# Patient Record
Sex: MALE | Race: WHITE | NOT HISPANIC OR LATINO
[De-identification: names, ages, dates, MRNs, and addresses within clinical notes are randomized per-mention and may not be internally consistent; named-entity substitution may affect disease eponyms.]

---

## 2019-02-05 PROBLEM — E03.9 HYPOTHYROIDISM, UNSPECIFIED TYPE: Status: ACTIVE | Noted: 2019-02-05

## 2019-02-07 ENCOUNTER — NON-APPOINTMENT (OUTPATIENT)
Age: 84
End: 2019-02-07

## 2019-02-07 ENCOUNTER — APPOINTMENT (OUTPATIENT)
Dept: HEART AND VASCULAR | Facility: CLINIC | Age: 84
End: 2019-02-07
Payer: MEDICARE

## 2019-02-07 VITALS — SYSTOLIC BLOOD PRESSURE: 152 MMHG | DIASTOLIC BLOOD PRESSURE: 92 MMHG

## 2019-02-07 VITALS
WEIGHT: 170 LBS | SYSTOLIC BLOOD PRESSURE: 148 MMHG | DIASTOLIC BLOOD PRESSURE: 83 MMHG | OXYGEN SATURATION: 98 % | BODY MASS INDEX: 25.47 KG/M2 | HEART RATE: 69 BPM | HEIGHT: 68.5 IN

## 2019-02-07 DIAGNOSIS — E55.9 VITAMIN D DEFICIENCY, UNSPECIFIED: ICD-10-CM

## 2019-02-07 DIAGNOSIS — E03.9 HYPOTHYROIDISM, UNSPECIFIED: ICD-10-CM

## 2019-02-07 PROCEDURE — 93000 ELECTROCARDIOGRAM COMPLETE: CPT

## 2019-02-07 PROCEDURE — 99215 OFFICE O/P EST HI 40 MIN: CPT | Mod: 25

## 2019-02-07 NOTE — REASON FOR VISIT
[Follow-Up - Clinic] : a clinic follow-up of [FreeTextEntry1] : Diagnostic Tests:\par -----------------------------------\par ECG:\par 02/07/19:  A-sensed, V-paced at 70 bpm. \par 01/09/18: A-sensed, V-paced at 70 bpm. \par 05/12/16: A-sensed, V-paced at 62 BPM. \par 03/24/15: A-paced, V-paced at 64bpm\par 08/28/13: A-paced, V-sensed, RBBB, LAFB, prolonged A paced to V-sensed delay (288ms)\par -----------------------------------\par CTCA:\par 02/24/15: Ca+ score 1027.41 (75th percentile), LM distal 50%, LAD prox 75%, LCX moderate, RCA moderate, PDA 50%, EF 56%, no RWMA\par ----------------------------\par Stress:\par 10/04/17: regadenoson MPI: EF 47%, normal perfusion.\par 03/03/15: exercise MPI: 7 METS, EF 54%, fixed inferior and inferoseptal defects\par 02/29/12: exercise MPI: 7 METS, mild inferior and inferoseptal scar, no ischemia, EF 54%, no RWMA\par 03/02/09: exercise MPI: 7 METS, mild inferior and inferoseptal scar, no ischemia, EF 54%, no RWMA\par ----------------------------\par Echo:\par 10/05/17: EF 38%, grade I diastolic dysfunction, reduced RV function, akinesis apical septum and basal/mid inferolateral walls, severe AS, NATE 0.8cm2, LVOT/AV 0.23, mean gradient 23mmHg. \par 05/12/16: EF 36%, akinesis apical anterior, apical inferior and apical walls, moderate AS, NATE 1.2cm2, moderate TR, mild MR, LVH\par 04/02/15: EF 41%, mid LAD MI, mild AS, NATE 1.7cm2, grade I diastolic dysfunction, dilated LA, mild TR, PASP 36mmHg\par 02/28/12: EF 55%, mild TR, diastolic dysfunction\par 02/14/09: EF 55%, aortic sclerosis, diastolic dysfunction\par -----------------------------\par Tilt table test:\par 08/14/13: + orthostatic induced vasovagal symptoms\par ----------------------------\par Carotid:\par 11/15/17: sono: EMI 40-59%, s/p left CEA patent\par 04/03/15: sono: s/p left CEA, LICA 20-49%, EMI 50-69%\par ----------------------------\par Cath:\par 03/16/15: EF 50%, LAD MI, LM distal 40%, LAD prox 95%, mid 80%, D3 50%, LCX prox 50%, distal 90%, LPL ostium 80%, RCA mid 40%, pDA 100% with collaterals from LAD\par ----------------------------\par Aorto-Iliac:\par 11/15/17: sono: AAA mid 3.6cm x 3.2cm, RCIA 2.5cm x 2.3cm. LCIA 1.9cm x 1.7cm.\par ----------------------------\par Upper extremity veins:\par 11/17/16: sono: right: no RUE DVT.

## 2019-02-07 NOTE — ASSESSMENT
[FreeTextEntry1] : 1. CAD: s/p CABG x 5 (04/06/15) (Malachi Jose MD), CCS 0, left pleural effusion s/p tap 06/01/15, s/p MPI stress test: 10/04/17: regadenoson MPI: EF 47%, normal perfusion.\par             - continue aspirin 81mg po daily\par             - continue Nitroglycerin Tablet Sublingual, 0.4 MG, Sublingual, Once a day as needed\par             - will pursue aggressive medical management\par             - will send for an echocardiogram \par  \par 2. Chronic systolic heart failure: NYHA II, EF 38% (10/05/17), s/p CRT-P, no indication for ICD for primary prevention of SCD at this time:\par             - continue labetalol and lisinopril\par             - continue prn loop diuretics, lasix 80mg po daily prn \par             - discussed with patient importance of maintaining a weight log, avoidance of dietary sodium, as well as appropriate use of loop diuretics\par             - will follow with serial echocardiograms (ordered today)\par             - consider switching ACE-I (lisinopril) to ARNI (Entresto) in future. \par \par 3. Carotid stenosis: s/p left CEA 12/14/04 (Kareem Swain MD), 11/15/17: sono: EMI 40-59%, left CEA patent\par             - will monitor with periodic carotid sonograms (performed at the vascular surgeon's office)\par             - continue statin and anti-platelet agent\par             - discussed with patient surveillance for neurologic symptoms. \par \par 4. Aortic stenosis: severe, low gradient (NATE 0.8cm2, LVOT/AV 0.23, mean gradient 28mmHg) (10/05/17), + increased BRANDT\par             - will follow with serial echocardiograms (ordered today)\par             - discussed with patient importance of symptom surveillance (chest pain, BRANDT, edema, or syncope). \par  \par 5. Aortic aneurysm, abdominal: s/p sono: mid 3.6cm x 3.2cm with b/l SLADE aneurysms, RCIA 2.5cm x 2.3cm, LCIA 1.9cm x 1.7cm (11/15/17)\par             - follow up with vascular surgeon, Kareem Swain MD. \par             - continue HTN management\par \par 6. HTN: BP at ACC/AHA 2017 guideline target, ? occasional low reading on ambulatory log\par             - continue labetalol 300mg po bid (hold evening dose if SBP <120)\par             - continue lisinopril 40mg po daily\par             - will review ambulatory BP log and calibrate device next visit       \par \par 7. BPH (benign prostatic hyperplasia) \par             - continue Finasteride Tablet, 5 MG, 1 tablet, Orally, Once a day\par             - continue Tamsulosin HCl Capsule, 0.4 MG, 1 capsule 30 minutes after the same meal each day, Orally, Once a day\par \par 8. Presence of cardiac pacemaker: s/p CRT-P, upgraded from PPM 09/2016 complicated by hematoma requiring device extraction and replacement on the right side (11/2016).\par             - continue follow up with device clinic. (will enroll in [facility] device clinic)\par \par 9. Dyslipidemia:\par             - continue atorvastatin 80mg po daily\par             - check lab work today

## 2019-02-07 NOTE — HISTORY OF PRESENT ILLNESS
[FreeTextEntry1] : Mr. Mays presents for follow up and management of HTN, dyslipidemia, CAD s/p CABG x 5 (04/06/15), chronic systolic heart failure, severe low gradient aortic stenosis, and YORDY s/p left CEA. He underwent 5-vessel coronary artery bypass grafting on 04/06/15. He had an echocardiogram on 05/12/16 which revealed an EF of 36%, akinesis of the apical anterior, apical inferior, and apical walls, moderate AS, NATE 1.2cm2, moderate TR, mild MR, and mild LVH. His pacemaker was upgraded to a CRT-P on 08/03/16. He had bleeding into the pocket and had pocket revision on 09/28/16. He was evaluated by Antonio Bustillo MD (general surgeon) at [facility] to address wound healing over the device. In 2017, in the office I examined the device wound and noted the wound edges not to be opposed and the device to be partially exposed. He went on to have the device extracted and replaced (11/2016) (Osito Alicea MD). He had an MPI stress test on 10/04/17 which revealed an EF of 47%, normal perfusion. He had an echocardiogram on 10/05/17 which revealed an EF of 38%, grade I diastolic dysfunction, reduced RV function, akinesis apical septum and basal/mid inferolateral walls, severe AS, NATE 0.8cm2, LVOT/AV 0.23, and mean gradient 23mmHg.  At present, he has complaints of "loosing a step" and progressive BRANDT despite minimal lower extremity edema. We discussed the possibility that this represents progression of his aortic stenosis.  His ambulatory BP log reveals evening readings with an SBP range of 98--140.  We discussed maintaining an ambulatory BP log and holding his evening dose of labetalol if his SBP pre-medication is <120 in the evenings.

## 2019-02-07 NOTE — REVIEW OF SYSTEMS
[Dyspnea on exertion] : dyspnea during exertion [Lower Ext Edema] : lower extremity edema [Urinary Frequency] : urinary frequency [Joint Pain] : joint pain [Negative] : Heme/Lymph [Feeling Fatigued] : feeling fatigued

## 2019-02-07 NOTE — PHYSICAL EXAM
[General Appearance - Well Developed] : well developed [Normal Appearance] : normal appearance [Well Groomed] : well groomed [General Appearance - Well Nourished] : well nourished [No Deformities] : no deformities [General Appearance - In No Acute Distress] : no acute distress [Normal Conjunctiva] : the conjunctiva exhibited no abnormalities [Eyelids - No Xanthelasma] : the eyelids demonstrated no xanthelasmas [Normal Oral Mucosa] : normal oral mucosa [No Oral Pallor] : no oral pallor [No Oral Cyanosis] : no oral cyanosis [Respiration, Rhythm And Depth] : normal respiratory rhythm and effort [Exaggerated Use Of Accessory Muscles For Inspiration] : no accessory muscle use [Auscultation Breath Sounds / Voice Sounds] : lungs were clear to auscultation bilaterally [Heart Rate And Rhythm] : heart rate and rhythm were normal [Heart Sounds] : normal S1 and S2 [Abdomen Soft] : soft [Abdomen Tenderness] : non-tender [Abdomen Mass (___ Cm)] : no abdominal mass palpated [Abnormal Walk] : normal gait [Gait - Sufficient For Exercise Testing] : the gait was sufficient for exercise testing [Nail Clubbing] : no clubbing of the fingernails [Cyanosis, Localized] : no localized cyanosis [Petechial Hemorrhages (___cm)] : no petechial hemorrhages [Skin Color & Pigmentation] : normal skin color and pigmentation [] : no rash [No Venous Stasis] : no venous stasis [Skin Lesions] : no skin lesions [No Skin Ulcers] : no skin ulcer [No Xanthoma] : no  xanthoma was observed [Oriented To Time, Place, And Person] : oriented to person, place, and time [Affect] : the affect was normal [Mood] : the mood was normal [No Anxiety] : not feeling anxious [FreeTextEntry1] : + median sternotomy, + device generator anterior right chest, 2/6 SAMIR late peaking RUSB without radiation

## 2019-02-08 LAB
24R-OH-CALCIDIOL SERPL-MCNC: 39.3 PG/ML
ALBUMIN SERPL ELPH-MCNC: 4.3 G/DL
ALP BLD-CCNC: 55 U/L
ALT SERPL-CCNC: 33 U/L
ANION GAP SERPL CALC-SCNC: 15 MMOL/L
AST SERPL-CCNC: 33 U/L
BASOPHILS # BLD AUTO: 0.03 K/UL
BASOPHILS NFR BLD AUTO: 0.5 %
BILIRUB SERPL-MCNC: 2.2 MG/DL
BUN SERPL-MCNC: 12 MG/DL
CALCIUM SERPL-MCNC: 10 MG/DL
CHLORIDE SERPL-SCNC: 106 MMOL/L
CHOLEST SERPL-MCNC: 122 MG/DL
CHOLEST/HDLC SERPL: 2 RATIO
CO2 SERPL-SCNC: 24 MMOL/L
CREAT SERPL-MCNC: 1.13 MG/DL
EOSINOPHIL # BLD AUTO: 0.1 K/UL
EOSINOPHIL NFR BLD AUTO: 1.6 %
GLUCOSE SERPL-MCNC: 83 MG/DL
HBA1C MFR BLD HPLC: 5.3 %
HCT VFR BLD CALC: 41 %
HDLC SERPL-MCNC: 61 MG/DL
HGB BLD-MCNC: 13 G/DL
IMM GRANULOCYTES NFR BLD AUTO: 0.2 %
LDLC SERPL CALC-MCNC: 50 MG/DL
LDLC SERPL DIRECT ASSAY-MCNC: 55 MG/DL
LYMPHOCYTES # BLD AUTO: 1.55 K/UL
LYMPHOCYTES NFR BLD AUTO: 25.4 %
MAN DIFF?: NORMAL
MCHC RBC-ENTMCNC: 31.7 GM/DL
MCHC RBC-ENTMCNC: 31.9 PG
MCV RBC AUTO: 100.7 FL
MONOCYTES # BLD AUTO: 0.53 K/UL
MONOCYTES NFR BLD AUTO: 8.7 %
NEUTROPHILS # BLD AUTO: 3.89 K/UL
NEUTROPHILS NFR BLD AUTO: 63.6 %
PLATELET # BLD AUTO: 197 K/UL
POTASSIUM SERPL-SCNC: 4.4 MMOL/L
PROT SERPL-MCNC: 7.2 G/DL
RBC # BLD: 4.07 M/UL
RBC # FLD: 15 %
SODIUM SERPL-SCNC: 145 MMOL/L
TRIGL SERPL-MCNC: 57 MG/DL
TSH SERPL-ACNC: 3.75 UIU/ML
WBC # FLD AUTO: 6.11 K/UL

## 2019-02-12 ENCOUNTER — APPOINTMENT (OUTPATIENT)
Dept: HEART AND VASCULAR | Facility: CLINIC | Age: 84
End: 2019-02-12
Payer: MEDICARE

## 2019-02-12 ENCOUNTER — APPOINTMENT (OUTPATIENT)
Dept: HEART AND VASCULAR | Facility: CLINIC | Age: 84
End: 2019-02-12

## 2019-02-12 VITALS — DIASTOLIC BLOOD PRESSURE: 78 MMHG | SYSTOLIC BLOOD PRESSURE: 155 MMHG

## 2019-02-12 PROCEDURE — 93306 TTE W/DOPPLER COMPLETE: CPT

## 2019-03-06 ENCOUNTER — APPOINTMENT (OUTPATIENT)
Dept: CARDIOTHORACIC SURGERY | Facility: CLINIC | Age: 84
End: 2019-03-06
Payer: MEDICARE

## 2019-03-06 VITALS
DIASTOLIC BLOOD PRESSURE: 87 MMHG | SYSTOLIC BLOOD PRESSURE: 164 MMHG | BODY MASS INDEX: 26.07 KG/M2 | HEART RATE: 70 BPM | TEMPERATURE: 98 F | WEIGHT: 172 LBS | RESPIRATION RATE: 98 BRPM | HEIGHT: 68 IN

## 2019-03-06 DIAGNOSIS — Z86.79 PERSONAL HISTORY OF OTHER DISEASES OF THE CIRCULATORY SYSTEM: ICD-10-CM

## 2019-03-06 DIAGNOSIS — Z80.0 FAMILY HISTORY OF MALIGNANT NEOPLASM OF DIGESTIVE ORGANS: ICD-10-CM

## 2019-03-06 DIAGNOSIS — Z82.49 FAMILY HISTORY OF ISCHEMIC HEART DISEASE AND OTHER DISEASES OF THE CIRCULATORY SYSTEM: ICD-10-CM

## 2019-03-06 DIAGNOSIS — Z82.3 FAMILY HISTORY OF STROKE: ICD-10-CM

## 2019-03-06 DIAGNOSIS — Z87.438 PERSONAL HISTORY OF OTHER DISEASES OF MALE GENITAL ORGANS: ICD-10-CM

## 2019-03-06 PROCEDURE — 99205 OFFICE O/P NEW HI 60 MIN: CPT

## 2019-03-07 PROBLEM — Z86.79 HISTORY OF CORONARY ARTERY DISEASE: Status: RESOLVED | Noted: 2019-03-07 | Resolved: 2019-03-07

## 2019-03-07 PROBLEM — Z87.438 HISTORY OF BENIGN PROSTATIC HYPERPLASIA: Status: RESOLVED | Noted: 2019-03-07 | Resolved: 2019-03-07

## 2019-03-07 PROBLEM — Z86.79 HISTORY OF PERIPHERAL ARTERIAL DISEASE: Status: RESOLVED | Noted: 2019-03-07 | Resolved: 2019-03-07

## 2019-03-07 NOTE — PHYSICAL EXAM
[General Appearance - Alert] : alert [General Appearance - In No Acute Distress] : in no acute distress [General Appearance - Well Nourished] : well nourished [General Appearance - Well-Appearing] : healthy appearing [Sclera] : the sclera and conjunctiva were normal [Strabismus] : no strabismus was seen [Outer Ear] : the ears and nose were normal in appearance [Examination Of The Oral Cavity] : the lips and gums were normal [Neck Appearance] : the appearance of the neck was normal [Neck Cervical Mass (___cm)] : no neck mass was observed [Jugular Venous Distention Increased] : there was no jugular-venous distention [Respiration, Rhythm And Depth] : normal respiratory rhythm and effort [Exaggerated Use Of Accessory Muscles For Inspiration] : no accessory muscle use [Auscultation Breath Sounds / Voice Sounds] : lungs were clear to auscultation bilaterally [Normal S1] : normal S1 [Normal S2] : normal S2 [S2 Diminished] : was diminished [III] : a grade 3 [No Pitting Edema] : no pitting edema present [Chest Visual Inspection Thoracic Asymmetry] : no chest asymmetry [Abdomen Soft] : soft [Abdomen Tenderness] : non-tender [No CVA Tenderness] : no ~M costovertebral angle tenderness [Abnormal Walk] : normal gait [Nail Clubbing] : no clubbing  or cyanosis of the fingernails [Involuntary Movements] : no involuntary movements were seen [Motor Tone] : muscle strength and tone were normal [Skin Color & Pigmentation] : normal skin color and pigmentation [] : no rash [Cranial Nerves] : cranial nerves 2-12 were intact [Oriented To Time, Place, And Person] : oriented to person, place, and time [Impaired Insight] : insight and judgment were intact [Affect] : the affect was normal [Mood] : the mood was normal

## 2019-03-08 NOTE — DATA REVIEWED
[FreeTextEntry1] : Echo 2019\par EF = 44%\par AV Vmax 3.6 m/s\par NATE 0.75 cm2\par MG 31 mmHG\par SV 22ml/m2\par mild LVH\par Trileaflet\par trace MR / Mod TR\par trace pulmonic regurgitation\par \par Cath 03/16/15 \par EF 50%, LAD MI, LM distal 40%, LAD prox 95%, mid 80%, D3 50%, distal 90%, LPL ostium 80%, RCA mid 40%, pDA 100% with collaterals from LAD\par \par Carotid: 11/15/17\par EMI 40/59% s/p left CEA patent \par \par EC19\par A-sensed, V-paced at 70 bpm\par \par

## 2019-03-08 NOTE — ASSESSMENT
[FreeTextEntry1] : Mr. Mays, is a very pleasant 88 year old gentlemen, who was perviously employed as a [employer]  and  prior to that.   He presents today with his spouse and two daughters for a consultation with Dr. Mccormick for his aortic valve disease of aortic stenosis.  His PHMx consists of CAD s/p CABG X 5V (04/06/15), chronic systolic HF (EF 44%), s/p PPM (CRT-P (08/03/16) with device extracted and replaced on 11/2016 2/2 device 2/2 partially exposure, NYHA II, YORDY s/p left CEA (12/14/04), PVD, abdominal aortic aneurysm (3.6 cm X 3.2 cm monitored by [facility] -  Dr. Swain), BPH, HTN and dyslipidemia.  \par \par Symptomatic Aortic Stenosis, A long discussion/education conducted with Pt and Pt's family by Dr. Mccormick on Quality of Life and Plan of Care. TTE revealed severely calcified trileaflet AV with increased gradients (mean 30mmHg, possible low flow low gradient vs underappreciating true gradients on TTE). Pt agrees to move forward with TAVR diagnostic work-up.   All questions answered by Dr. Mccormick.\par \par Dr. Mccormick will discuss plan of care with Dr. Martins.\par Plan for TAVR diagnostics/screenings. \par F/u with Patient/family to discuss plan of care.\par Continue Medication Regimen. \par Video Education provided on Aortic Stenosis and TAVR procedure to Pt/family. \par

## 2019-03-08 NOTE — HISTORY OF PRESENT ILLNESS
[Dyslipidemia] : Dyslipidemia [CVD Carotid Stenosis] : CVD Carotid Stenosis   [Right] : Right [No angina, No symptoms] : No symptoms of [Heart Failure within 2 Weeks] : Heart Failure in last 2 weeks [Class II] : Class II [FreeTextEntry1] : Mr. Mays, is a very pleasant 88 year old gentlemen, who was perviously employed as a [employer]  and  prior to that.   He presents today with his spouse and two daughters for a consultation with Dr. Mccormick for his aortic valve disease of aortic stenosis.  His PHMx consists of CAD s/p CABG X 5V (04/06/15), chronic systolic HF (EF 44%), s/p PPM (CRT-P (08/03/16) with device extracted and replaced on 11/2016 2/2 device 2/2 partially exposure, NYHA II, YORDY s/p left CEA (12/14/04), PVD, abdominal aortic aneurysm (3.6 cm X 3.2 cm monitored by Vasc -  Dr. Swain), BPH, HTN and dyslipidemia.  \par \par Pt reports, " a decline in his stamina", progressively worsening over the last year or so.  Experiencing increase BRANDT.  Pt claims, he was able to walk about 2 miles and now notices a change in his distance due to his SOB, will have BRANDT with 1 FOS.  Mr. Mays mentions, his blood pressure drops in the evening hours "100/50's"  then he feels weak and tired.   Pt denies CP, palpitations, and dizziness.  Pt is independent and high functioning, he continues to do chores around the house and still drives.  \par \par  Pt was made aware of his aortic valve by Dr. Martins approx 2 years ago. [Diabetes Mellitus] : no Diabetes Melllitus [Home Oxygen] : no home oxygen use [Liver Disease] : no liver disease [Unresponsive Neurologic State] : not in a unresponsive neurologic state [Cerebrovascular Disease] : no cerebrovascular disease [Prior Myocardial Infarction] : No prior myocardial infarction

## 2019-03-08 NOTE — REVIEW OF SYSTEMS
[Feeling Tired] : feeling tired [SOB on Exertion] : shortness of breath during exertion [Negative] : Psychiatric [Fever] : no fever [Chills] : no chills [Feeling Poorly] : not feeling poorly [Recent Weight Gain (___ Lbs)] : no recent weight gain [Recent Weight Loss (___ Lbs)] : no recent weight loss [Chest Pain] : no chest pain [Palpitations] : no palpitations [Lower Ext Edema] : no extremity edema [Wheezing] : no wheezing [Abdominal Pain] : no abdominal pain [Vomiting] : no vomiting [Constipation] : no constipation [Diarrhea] : no diarrhea [Confused] : no confusion [Convulsions] : no convulsions [Dizziness] : no dizziness [Fainting] : no fainting [Limb Weakness] : no limb weakness [Difficulty Walking] : no difficulty walking

## 2019-03-08 NOTE — REASON FOR VISIT
[Consultation] : a consultation visit [Spouse] : spouse [Family Member] : family member [FreeTextEntry1] : Aortic Valve Disease of Aortic Stenosis

## 2019-03-18 ENCOUNTER — FORM ENCOUNTER (OUTPATIENT)
Age: 84
End: 2019-03-18

## 2019-03-19 ENCOUNTER — OUTPATIENT (OUTPATIENT)
Dept: OUTPATIENT SERVICES | Facility: HOSPITAL | Age: 84
LOS: 1 days | Discharge: HOME | End: 2019-03-19
Payer: MEDICARE

## 2019-03-19 DIAGNOSIS — I71.9 AORTIC ANEURYSM OF UNSPECIFIED SITE, WITHOUT RUPTURE: ICD-10-CM

## 2019-03-19 DIAGNOSIS — I35.0 NONRHEUMATIC AORTIC (VALVE) STENOSIS: ICD-10-CM

## 2019-03-19 DIAGNOSIS — I25.10 ATHEROSCLEROTIC HEART DISEASE OF NATIVE CORONARY ARTERY WITHOUT ANGINA PECTORIS: ICD-10-CM

## 2019-03-19 PROCEDURE — 93880 EXTRACRANIAL BILAT STUDY: CPT | Mod: 26

## 2019-03-20 ENCOUNTER — TRANSCRIPTION ENCOUNTER (OUTPATIENT)
Age: 84
End: 2019-03-20

## 2019-03-25 NOTE — PROGRESS NOTE ADULT - SUBJECTIVE AND OBJECTIVE BOX
[facility] Interventional Cardiology Addendum Note to Structural Heart AMBI H&P:     Attending MD: Dr John Mccormick        S: Pt presents for Right/Right and Left/Left Heart Catheterization (see office H&P for detailed History).  Pt reports that today he/she feels ...............        Allergies    Allergy Status Unknown    Intolerances        Current Medications:       PHYSICAL EXAM    V/S		BP:		        HR:	           RR: 		  TEMP:     General:   HEENT: NCAT, EOMI, PERRLA  NECK: No JVD, No carotid bruits B/L, +2 Carotid pulses B/L  PULM:  CTA B/L No W/R/R  CARD: RRR/Irreg, +S1 +S2,  M/R/G  ABD: ND, +BS, NT, no masses  EXT: Warm, No pedal edema  NEURO: A & O x 3, no focal neurologic deficits  PULSES:	     B	          R	      	  FEM          		           DP        PT       Right              			  No/Yes	        Bruit	          Left       	         		  No/Yes	        Bruit	   		                                                              LABS:                        EKG:    ASA _____				Mallampati class: _________	    A/P:          Sedation Plan:   ? None   ? Moderate    ?  Deep    ?  General Anesthesia   Patient Is Suitable Candidate For Sedation?     ? Yes   ? No   ? Not Applicable     Risks & benefits of procedure and sedation and risks and benefits for the alternative therapy have been explained to the patient in layman’s terms including but not limited to: allergic reaction, bleeding, infection, arrhythmia, respiratory compromise, renal and vascular compromise, limb damage, MI, CVA, emergent CABG/Vascular Surgery and death. Informed consent obtained and in chart. [facility] Interventional Cardiology Addendum Note to Structural Heart AMBI H&P:     Attending MD: Dr John Mccormick        S: Pt presents for Right/Right and Left/Left Heart Catheterization (see office H&P for detailed History).  Pt reports that today he feels well and denies C/P, SOB at this time.       Allergies    NKDA NKFA    Intolerances        Current Medications:  Aspirin Low Dose 81 MG TABS; TAKE 1 TABLET DAILY  Atorvastatin Calcium 80 MG Oral Tablet; TAKE 1 TABLET DAILY  Furosemide 80 MG Oral Tablet; TAKE 1 TABLET DAILY AS DIRECTED  Labetalol HCl - 300 MG Oral Tablet; TAKE 1 TABLET EVERY 12 HOURS DAILY  Lisinopril 40 MG Oral Tablet; TAKE 1 TABLET DAILY FOR BLOOD PRESSURE  Nitroglycerin 0.4 MG Sublingual Tablet Sublingual  Proscar 5 MG Oral Tablet; TAKE 1 TABLET DAILY  Synthroid 137 MCG Oral Tablet; TAKE 1 TABLET DAILY  Tamsulosin HCl - 0.4 MG Oral Capsule; TAKE 1 CAPSULE BY MOUTH 1 TIME AFTER  MEALS    PHYSICAL EXAM    V/S		BP: 189/81		        HR: 70	           RR: 		  TEMP: 97.1 F              O2 sat 98% RA    General: Laying in stretcher. NAD   HEENT: NCAT, EOMI, PERRLA  NECK: Supple. No JVD. +Well healed left carotid scar.   PULM:  CTA Bilaterally. No rhonchi, wheezes, rales.   CARD: + S1 S2. RRR. 3/6 SAMIR RUSB. + Well healed mid sternotomy scar. Palpable PPM Right Upper Chest Wall  ABD: BS x 4. Soft NT/ND  EXT: Trace to 1+ non-pitting edema RLE. RLE slightly cool to palpation in comparison to LLE. No calf tenderness BLE. Venous stasis.   NEURO: A & O x 3, no focal neurologic deficits     PULSES:	     B	          R	      	                     FEM          		           DP        PT       Right                     2+   			          2+                No       Bruit	2+       1+       Left       	         N/A-Removed for CABG	          2+	       No  Bruit	   	2+	1+                                                              LABS:                        12.4   7.99  )-----------( 219      ( 26 Mar 2019 09:28 )             38.7       03-26    145  |  108  |  20  ----------------------------<  97  3.9   |  25  |  1.05    Ca    9.7      26 Mar 2019 09:28  Phos  3.2     03-26  Mg     1.8     03-26    TPro  7.4  /  Alb  4.4  /  TBili  1.3<H>  /  DBili  x   /  AST  25  /  ALT  23  /  AlkPhos  61  03-26      PT/INR - ( 26 Mar 2019 09:28 )   PT: 12.5 sec;   INR: 1.10          PTT - ( 26 Mar 2019 09:28 )  PTT:29.1 sec    CARDIAC MARKERS ( 26 Mar 2019 09:28 )  x     / x     / 139 U/L / x     / 3.2 ng/mL        EKG: VPaced at 70 bpm.     ASA III_____				Mallampati class: _III________	    A/P:     88 year old M with PMHx HTN, Dyslipidemia, CAD s/p 5V CABG 4/6/15, chronic systolic HF (EF 44%), s/p PPM (CRT-P (08/03/16) with device extracted and replaced on 11/2016 2/2 device 2/2 partially exposure, NYHA II, YORDY s/p left CEA (12/14/04), PVD, abdominal aortic aneurysm (3.6 cm X 3.2 [facility]), CKD Stage II-GFR 63 Cr 1.05 on admission), BPH who presents for right and left cardiac cath as part of TAVR work-up.     Patient took his ASA 81 mg this AM 3/26/19. Plavix 300 mg load prior to procedure per Dr. Mccormick.  History of chronic systolic CHF EF 44%-Patient euvolemic on exam. 1/2 NS 40 cc/hr IVF pre-cath given CHF and Aortic Stenosis. CKD Stage II-GFR 63 Cr 1.05 on admission),     Sedation Plan:   ? None   ? Moderate    ?  Deep    ?  General Anesthesia   Patient Is Suitable Candidate For Sedation?     ? Yes   ? No   ? Not Applicable     Risks & benefits of procedure and sedation and risks and benefits for the alternative therapy have been explained to the patient in layman’s terms including but not limited to: allergic reaction, bleeding, infection, arrhythmia, respiratory compromise, renal and vascular compromise, limb damage, MI, CVA, emergent CABG/Vascular Surgery and death. Informed consent obtained and in chart.

## 2019-03-26 ENCOUNTER — OUTPATIENT (OUTPATIENT)
Dept: OUTPATIENT SERVICES | Facility: HOSPITAL | Age: 84
LOS: 1 days | End: 2019-03-26
Payer: MEDICARE

## 2019-03-26 ENCOUNTER — APPOINTMENT (OUTPATIENT)
Dept: CARDIOTHORACIC SURGERY | Facility: HOSPITAL | Age: 84
End: 2019-03-26
Payer: MEDICARE

## 2019-03-26 VITALS — HEIGHT: 68 IN | WEIGHT: 175.05 LBS

## 2019-03-26 LAB
ALBUMIN SERPL ELPH-MCNC: 4.4 G/DL — SIGNIFICANT CHANGE UP (ref 3.3–5)
ALP SERPL-CCNC: 61 U/L — SIGNIFICANT CHANGE UP (ref 40–120)
ALT FLD-CCNC: 23 U/L — SIGNIFICANT CHANGE UP (ref 10–45)
ANION GAP SERPL CALC-SCNC: 12 MMOL/L — SIGNIFICANT CHANGE UP (ref 5–17)
APTT BLD: 29.1 SEC — SIGNIFICANT CHANGE UP (ref 27.5–36.3)
AST SERPL-CCNC: 25 U/L — SIGNIFICANT CHANGE UP (ref 10–40)
BASOPHILS # BLD AUTO: 0.06 K/UL — SIGNIFICANT CHANGE UP (ref 0–0.2)
BASOPHILS NFR BLD AUTO: 0.8 % — SIGNIFICANT CHANGE UP (ref 0–2)
BILIRUB SERPL-MCNC: 1.3 MG/DL — HIGH (ref 0.2–1.2)
BLD GP AB SCN SERPL QL: NEGATIVE — SIGNIFICANT CHANGE UP
BUN SERPL-MCNC: 20 MG/DL — SIGNIFICANT CHANGE UP (ref 7–23)
CALCIUM SERPL-MCNC: 9.7 MG/DL — SIGNIFICANT CHANGE UP (ref 8.4–10.5)
CHLORIDE SERPL-SCNC: 108 MMOL/L — SIGNIFICANT CHANGE UP (ref 96–108)
CHOLEST SERPL-MCNC: 113 MG/DL — SIGNIFICANT CHANGE UP (ref 10–199)
CK MB CFR SERPL CALC: 3.2 NG/ML — SIGNIFICANT CHANGE UP (ref 0–6.7)
CK SERPL-CCNC: 139 U/L — SIGNIFICANT CHANGE UP (ref 30–200)
CO2 SERPL-SCNC: 25 MMOL/L — SIGNIFICANT CHANGE UP (ref 22–31)
CREAT SERPL-MCNC: 1.05 MG/DL — SIGNIFICANT CHANGE UP (ref 0.5–1.3)
EOSINOPHIL # BLD AUTO: 0.24 K/UL — SIGNIFICANT CHANGE UP (ref 0–0.5)
EOSINOPHIL NFR BLD AUTO: 3 % — SIGNIFICANT CHANGE UP (ref 0–6)
GLUCOSE SERPL-MCNC: 97 MG/DL — SIGNIFICANT CHANGE UP (ref 70–99)
HBA1C BLD-MCNC: 5 % — SIGNIFICANT CHANGE UP (ref 4–5.6)
HCT VFR BLD CALC: 38.7 % — LOW (ref 39–50)
HDLC SERPL-MCNC: 58 MG/DL — SIGNIFICANT CHANGE UP
HGB BLD-MCNC: 12.4 G/DL — LOW (ref 13–17)
IMM GRANULOCYTES NFR BLD AUTO: 0.4 % — SIGNIFICANT CHANGE UP (ref 0–1.5)
INR BLD: 1.1 — SIGNIFICANT CHANGE UP (ref 0.88–1.16)
LIPID PNL WITH DIRECT LDL SERPL: 45 MG/DL — SIGNIFICANT CHANGE UP
LYMPHOCYTES # BLD AUTO: 1.64 K/UL — SIGNIFICANT CHANGE UP (ref 1–3.3)
LYMPHOCYTES # BLD AUTO: 20.5 % — SIGNIFICANT CHANGE UP (ref 13–44)
MAGNESIUM SERPL-MCNC: 1.8 MG/DL — SIGNIFICANT CHANGE UP (ref 1.6–2.6)
MCHC RBC-ENTMCNC: 32 GM/DL — SIGNIFICANT CHANGE UP (ref 32–36)
MCHC RBC-ENTMCNC: 32.6 PG — SIGNIFICANT CHANGE UP (ref 27–34)
MCV RBC AUTO: 101.8 FL — HIGH (ref 80–100)
MONOCYTES # BLD AUTO: 0.59 K/UL — SIGNIFICANT CHANGE UP (ref 0–0.9)
MONOCYTES NFR BLD AUTO: 7.4 % — SIGNIFICANT CHANGE UP (ref 2–14)
NEUTROPHILS # BLD AUTO: 5.43 K/UL — SIGNIFICANT CHANGE UP (ref 1.8–7.4)
NEUTROPHILS NFR BLD AUTO: 67.9 % — SIGNIFICANT CHANGE UP (ref 43–77)
NRBC # BLD: 0 /100 WBCS — SIGNIFICANT CHANGE UP (ref 0–0)
NT-PROBNP SERPL-SCNC: 737 PG/ML — HIGH (ref 0–300)
PHOSPHATE SERPL-MCNC: 3.2 MG/DL — SIGNIFICANT CHANGE UP (ref 2.5–4.5)
PLATELET # BLD AUTO: 219 K/UL — SIGNIFICANT CHANGE UP (ref 150–400)
POTASSIUM SERPL-MCNC: 3.9 MMOL/L — SIGNIFICANT CHANGE UP (ref 3.5–5.3)
POTASSIUM SERPL-SCNC: 3.9 MMOL/L — SIGNIFICANT CHANGE UP (ref 3.5–5.3)
PROT SERPL-MCNC: 7.4 G/DL — SIGNIFICANT CHANGE UP (ref 6–8.3)
PROTHROM AB SERPL-ACNC: 12.5 SEC — SIGNIFICANT CHANGE UP (ref 10–12.9)
RBC # BLD: 3.8 M/UL — LOW (ref 4.2–5.8)
RBC # FLD: 13.5 % — SIGNIFICANT CHANGE UP (ref 10.3–14.5)
RH IG SCN BLD-IMP: POSITIVE — SIGNIFICANT CHANGE UP
SODIUM SERPL-SCNC: 145 MMOL/L — SIGNIFICANT CHANGE UP (ref 135–145)
TOTAL CHOLESTEROL/HDL RATIO MEASUREMENT: 1.9 RATIO — LOW (ref 3.4–9.6)
TRIGL SERPL-MCNC: 48 MG/DL — SIGNIFICANT CHANGE UP (ref 10–149)
WBC # BLD: 7.99 K/UL — SIGNIFICANT CHANGE UP (ref 3.8–10.5)
WBC # FLD AUTO: 7.99 K/UL — SIGNIFICANT CHANGE UP (ref 3.8–10.5)

## 2019-03-26 PROCEDURE — 93458 L HRT ARTERY/VENTRICLE ANGIO: CPT | Mod: 26

## 2019-03-26 PROCEDURE — 94010 BREATHING CAPACITY TEST: CPT | Mod: 26

## 2019-03-26 PROCEDURE — 99212 OFFICE O/P EST SF 10 MIN: CPT

## 2019-03-26 RX ORDER — CLOPIDOGREL BISULFATE 75 MG/1
300 TABLET, FILM COATED ORAL ONCE
Qty: 0 | Refills: 0 | Status: COMPLETED | OUTPATIENT
Start: 2019-03-26 | End: 2019-03-26

## 2019-03-26 RX ORDER — CHLORHEXIDINE GLUCONATE 213 G/1000ML
1 SOLUTION TOPICAL ONCE
Qty: 0 | Refills: 0 | Status: DISCONTINUED | OUTPATIENT
Start: 2019-03-26 | End: 2019-03-26

## 2019-03-26 RX ORDER — SODIUM CHLORIDE 9 MG/ML
500 INJECTION, SOLUTION INTRAVENOUS
Qty: 0 | Refills: 0 | Status: DISCONTINUED | OUTPATIENT
Start: 2019-03-26 | End: 2019-03-26

## 2019-03-26 RX ADMIN — CLOPIDOGREL BISULFATE 300 MILLIGRAM(S): 75 TABLET, FILM COATED ORAL at 10:40

## 2019-03-26 NOTE — PROGRESS NOTE ADULT - SUBJECTIVE AND OBJECTIVE BOX
Interventional Cardiology PA SDA Discharge Note    Patient without complaints. Ambulated and voided without difficulties    Afebrile, VSS    Ext:    		Right             Groin:  no     hematoma, no    bruit, dressing; C/D/I      Pulses:    intact DP/PT to baseline     A/P:  88 year old M with PMHx HTN, Dyslipidemia, CAD s/p 5V CABG 4/6/15, chronic systolic HF (EF 44%), s/p PPM (CRT-P (08/03/16) with device extracted and replaced on 11/2016 2/2 device 2/2 partially exposure, NYHA II, YORDY s/p left CEA (12/14/04), PVD, abdominal aortic aneurysm (3.6 cm X 3.2 cm monitored by [facility] - Dr. Swain), CKD Stage II-GFR 63 Cr 1.05 on admission), BPH who presents for right and left cardiac cath as part of TAVR work-up. Cardiac cath revealed midRCA 50%, distalRCA 80%, LM 40%, proxLAD occluded, LCx 50%, proxOM2 60%, SVG-Diagonal patent. SVG-OM1 patent, LIMA-LAD patent, rad-LPL patent, SVG-RPDA not visualized but there was compensatory flow retrograde into RCA, right groin manual compression.        1.	Stable for discharge as per attending Dr. Mccormick after bed rest, pt voids, groin/wrist stable and 30 minutes of ambulation.  2.	Follow-up with PMD/Cardiologist Dr. Mccormick in 1-2 weeks  3.	Discharged forms signed and copies in chart

## 2019-03-27 NOTE — HISTORY OF PRESENT ILLNESS
[FreeTextEntry1] : **Consult done in cathlab** \par \par 88 year old male with a past medical history  of CAD s/p CABG X 5V (04/06/15), chronic systolic HF (EF 44%), s/p PPM (CRT-P (08/03/16) with device extracted and replaced on 11/2016 2/2 device 2/2 partially exposure, YORDY s/p left CEA (12/14/04), PVD, abdominal aortic aneurysm (3.6 cm X 3.2 cm monitored by [facility] -  Dr. Swain), BPH, HTN and dyslipidemia who is being consulted for TAVR vs SAVR.\par \par Patient is c/o NYHA Class II-III, dyspnea and fatigue with exertion \par \par Patient underwent a cardiac catherization day confirmed severe aortic stenosis with a mean gradient of 41 mmHg.  [Diabetes Mellitus] : no Diabetes Melllitus [Dyslipidemia] : no dyslipidemia [Home Oxygen] : no home oxygen use [Liver Disease] : no liver disease [Unresponsive Neurologic State] : not in a unresponsive neurologic state [Cerebrovascular Disease] : no cerebrovascular disease [Prior Myocardial Infarction] : No prior myocardial infarction

## 2019-03-27 NOTE — PHYSICAL EXAM
[General Appearance - Alert] : alert [General Appearance - In No Acute Distress] : in no acute distress [General Appearance - Well Nourished] : well nourished [General Appearance - Well-Appearing] : healthy appearing [Sclera] : the sclera and conjunctiva were normal [Strabismus] : no strabismus was seen [Outer Ear] : the ears and nose were normal in appearance [Examination Of The Oral Cavity] : the lips and gums were normal [Neck Appearance] : the appearance of the neck was normal [Neck Cervical Mass (___cm)] : no neck mass was observed [Jugular Venous Distention Increased] : there was no jugular-venous distention [Respiration, Rhythm And Depth] : normal respiratory rhythm and effort [Exaggerated Use Of Accessory Muscles For Inspiration] : no accessory muscle use [Auscultation Breath Sounds / Voice Sounds] : lungs were clear to auscultation bilaterally [Chest Visual Inspection Thoracic Asymmetry] : no chest asymmetry [Abdomen Soft] : soft [Abdomen Tenderness] : non-tender [No CVA Tenderness] : no ~M costovertebral angle tenderness [Abnormal Walk] : normal gait [Nail Clubbing] : no clubbing  or cyanosis of the fingernails [Involuntary Movements] : no involuntary movements were seen [Motor Tone] : muscle strength and tone were normal [Skin Color & Pigmentation] : normal skin color and pigmentation [] : no rash [Cranial Nerves] : cranial nerves 2-12 were intact [Oriented To Time, Place, And Person] : oriented to person, place, and time [Impaired Insight] : insight and judgment were intact [Affect] : the affect was normal [Mood] : the mood was normal [Normal S1] : normal S1 [Normal S2] : normal S2 [S2 Diminished] : was diminished [III] : a grade 3 [No Pitting Edema] : no pitting edema present

## 2019-04-02 ENCOUNTER — INPATIENT (INPATIENT)
Facility: HOSPITAL | Age: 84
LOS: 1 days | Discharge: HOME HEALTH | DRG: 267 | End: 2019-04-04
Attending: INTERNAL MEDICINE | Admitting: INTERNAL MEDICINE
Payer: MEDICARE

## 2019-04-02 ENCOUNTER — APPOINTMENT (OUTPATIENT)
Dept: CARDIOTHORACIC SURGERY | Facility: HOSPITAL | Age: 84
End: 2019-04-02
Payer: MEDICARE

## 2019-04-02 VITALS
TEMPERATURE: 98 F | HEART RATE: 70 BPM | HEIGHT: 68 IN | OXYGEN SATURATION: 97 % | WEIGHT: 175.05 LBS | SYSTOLIC BLOOD PRESSURE: 130 MMHG | RESPIRATION RATE: 14 BRPM | DIASTOLIC BLOOD PRESSURE: 63 MMHG

## 2019-04-02 DIAGNOSIS — R09.89 OTHER SPECIFIED SYMPTOMS AND SIGNS INVOLVING THE CIRCULATORY AND RESPIRATORY SYSTEMS: ICD-10-CM

## 2019-04-02 DIAGNOSIS — Z98.890 OTHER SPECIFIED POSTPROCEDURAL STATES: Chronic | ICD-10-CM

## 2019-04-02 DIAGNOSIS — Z95.1 PRESENCE OF AORTOCORONARY BYPASS GRAFT: Chronic | ICD-10-CM

## 2019-04-02 DIAGNOSIS — I26.99 OTHER PULMONARY EMBOLISM WITHOUT ACUTE COR PULMONALE: ICD-10-CM

## 2019-04-02 DIAGNOSIS — Z95.0 PRESENCE OF CARDIAC PACEMAKER: Chronic | ICD-10-CM

## 2019-04-02 LAB
ALBUMIN SERPL ELPH-MCNC: 3.8 G/DL — SIGNIFICANT CHANGE UP (ref 3.3–5)
ALBUMIN SERPL ELPH-MCNC: 4.6 G/DL — SIGNIFICANT CHANGE UP (ref 3.3–5)
ALP SERPL-CCNC: 61 U/L — SIGNIFICANT CHANGE UP (ref 40–120)
ALP SERPL-CCNC: 71 U/L — SIGNIFICANT CHANGE UP (ref 40–120)
ALT FLD-CCNC: 27 U/L — SIGNIFICANT CHANGE UP (ref 10–45)
ALT FLD-CCNC: 34 U/L — SIGNIFICANT CHANGE UP (ref 10–45)
ANION GAP SERPL CALC-SCNC: 11 MMOL/L — SIGNIFICANT CHANGE UP (ref 5–17)
ANION GAP SERPL CALC-SCNC: 11 MMOL/L — SIGNIFICANT CHANGE UP (ref 5–17)
ANION GAP SERPL CALC-SCNC: 12 MMOL/L — SIGNIFICANT CHANGE UP (ref 5–17)
APTT BLD: 28.9 SEC — SIGNIFICANT CHANGE UP (ref 27.5–36.3)
APTT BLD: 29.5 SEC — SIGNIFICANT CHANGE UP (ref 27.5–36.3)
APTT BLD: 30.6 SEC — SIGNIFICANT CHANGE UP (ref 27.5–36.3)
AST SERPL-CCNC: 32 U/L — SIGNIFICANT CHANGE UP (ref 10–40)
AST SERPL-CCNC: 39 U/L — SIGNIFICANT CHANGE UP (ref 10–40)
BASE EXCESS BLDA CALC-SCNC: 1.8 MMOL/L — SIGNIFICANT CHANGE UP (ref -2–3)
BASOPHILS # BLD AUTO: 0.04 K/UL — SIGNIFICANT CHANGE UP (ref 0–0.2)
BASOPHILS # BLD AUTO: 0.06 K/UL — SIGNIFICANT CHANGE UP (ref 0–0.2)
BASOPHILS NFR BLD AUTO: 0.6 % — SIGNIFICANT CHANGE UP (ref 0–2)
BASOPHILS NFR BLD AUTO: 0.9 % — SIGNIFICANT CHANGE UP (ref 0–2)
BILIRUB SERPL-MCNC: 1.5 MG/DL — HIGH (ref 0.2–1.2)
BILIRUB SERPL-MCNC: 1.8 MG/DL — HIGH (ref 0.2–1.2)
BUN SERPL-MCNC: 17 MG/DL — SIGNIFICANT CHANGE UP (ref 7–23)
BUN SERPL-MCNC: 18 MG/DL — SIGNIFICANT CHANGE UP (ref 7–23)
BUN SERPL-MCNC: 23 MG/DL — SIGNIFICANT CHANGE UP (ref 7–23)
CALCIUM SERPL-MCNC: 9 MG/DL — SIGNIFICANT CHANGE UP (ref 8.4–10.5)
CALCIUM SERPL-MCNC: 9.3 MG/DL — SIGNIFICANT CHANGE UP (ref 8.4–10.5)
CALCIUM SERPL-MCNC: 9.9 MG/DL — SIGNIFICANT CHANGE UP (ref 8.4–10.5)
CHLORIDE SERPL-SCNC: 104 MMOL/L — SIGNIFICANT CHANGE UP (ref 96–108)
CHLORIDE SERPL-SCNC: 107 MMOL/L — SIGNIFICANT CHANGE UP (ref 96–108)
CHLORIDE SERPL-SCNC: 108 MMOL/L — SIGNIFICANT CHANGE UP (ref 96–108)
CO2 SERPL-SCNC: 24 MMOL/L — SIGNIFICANT CHANGE UP (ref 22–31)
CO2 SERPL-SCNC: 24 MMOL/L — SIGNIFICANT CHANGE UP (ref 22–31)
CO2 SERPL-SCNC: 25 MMOL/L — SIGNIFICANT CHANGE UP (ref 22–31)
CREAT SERPL-MCNC: 1 MG/DL — SIGNIFICANT CHANGE UP (ref 0.5–1.3)
CREAT SERPL-MCNC: 1.09 MG/DL — SIGNIFICANT CHANGE UP (ref 0.5–1.3)
CREAT SERPL-MCNC: 1.15 MG/DL — SIGNIFICANT CHANGE UP (ref 0.5–1.3)
EOSINOPHIL # BLD AUTO: 0.25 K/UL — SIGNIFICANT CHANGE UP (ref 0–0.5)
EOSINOPHIL # BLD AUTO: 0.26 K/UL — SIGNIFICANT CHANGE UP (ref 0–0.5)
EOSINOPHIL NFR BLD AUTO: 3.8 % — SIGNIFICANT CHANGE UP (ref 0–6)
EOSINOPHIL NFR BLD AUTO: 4 % — SIGNIFICANT CHANGE UP (ref 0–6)
GAS PNL BLDA: SIGNIFICANT CHANGE UP
GLUCOSE BLDC GLUCOMTR-MCNC: 82 MG/DL — SIGNIFICANT CHANGE UP (ref 70–99)
GLUCOSE SERPL-MCNC: 100 MG/DL — HIGH (ref 70–99)
GLUCOSE SERPL-MCNC: 103 MG/DL — HIGH (ref 70–99)
GLUCOSE SERPL-MCNC: 94 MG/DL — SIGNIFICANT CHANGE UP (ref 70–99)
HCO3 BLDA-SCNC: 25 MMOL/L — SIGNIFICANT CHANGE UP (ref 21–28)
HCT VFR BLD CALC: 32.5 % — LOW (ref 39–50)
HCT VFR BLD CALC: 33.9 % — LOW (ref 39–50)
HCT VFR BLD CALC: 39.5 % — SIGNIFICANT CHANGE UP (ref 39–50)
HGB BLD-MCNC: 11 G/DL — LOW (ref 13–17)
HGB BLD-MCNC: 11.3 G/DL — LOW (ref 13–17)
HGB BLD-MCNC: 13 G/DL — SIGNIFICANT CHANGE UP (ref 13–17)
IMM GRANULOCYTES NFR BLD AUTO: 0.2 % — SIGNIFICANT CHANGE UP (ref 0–1.5)
IMM GRANULOCYTES NFR BLD AUTO: 1.1 % — SIGNIFICANT CHANGE UP (ref 0–1.5)
INR BLD: 1.07 — SIGNIFICANT CHANGE UP (ref 0.88–1.16)
INR BLD: 1.16 — SIGNIFICANT CHANGE UP (ref 0.88–1.16)
INR BLD: 1.24 — HIGH (ref 0.88–1.16)
LYMPHOCYTES # BLD AUTO: 1.44 K/UL — SIGNIFICANT CHANGE UP (ref 1–3.3)
LYMPHOCYTES # BLD AUTO: 1.87 K/UL — SIGNIFICANT CHANGE UP (ref 1–3.3)
LYMPHOCYTES # BLD AUTO: 22.2 % — SIGNIFICANT CHANGE UP (ref 13–44)
LYMPHOCYTES # BLD AUTO: 28.1 % — SIGNIFICANT CHANGE UP (ref 13–44)
MAGNESIUM SERPL-MCNC: 1.7 MG/DL — SIGNIFICANT CHANGE UP (ref 1.6–2.6)
MCHC RBC-ENTMCNC: 32.9 GM/DL — SIGNIFICANT CHANGE UP (ref 32–36)
MCHC RBC-ENTMCNC: 33.1 PG — SIGNIFICANT CHANGE UP (ref 27–34)
MCHC RBC-ENTMCNC: 33.3 GM/DL — SIGNIFICANT CHANGE UP (ref 32–36)
MCHC RBC-ENTMCNC: 33.4 PG — SIGNIFICANT CHANGE UP (ref 27–34)
MCHC RBC-ENTMCNC: 33.5 PG — SIGNIFICANT CHANGE UP (ref 27–34)
MCHC RBC-ENTMCNC: 33.8 GM/DL — SIGNIFICANT CHANGE UP (ref 32–36)
MCV RBC AUTO: 100.3 FL — HIGH (ref 80–100)
MCV RBC AUTO: 100.5 FL — HIGH (ref 80–100)
MCV RBC AUTO: 99.1 FL — SIGNIFICANT CHANGE UP (ref 80–100)
MONOCYTES # BLD AUTO: 0.52 K/UL — SIGNIFICANT CHANGE UP (ref 0–0.9)
MONOCYTES # BLD AUTO: 0.59 K/UL — SIGNIFICANT CHANGE UP (ref 0–0.9)
MONOCYTES NFR BLD AUTO: 8 % — SIGNIFICANT CHANGE UP (ref 2–14)
MONOCYTES NFR BLD AUTO: 8.9 % — SIGNIFICANT CHANGE UP (ref 2–14)
NEUTROPHILS # BLD AUTO: 3.88 K/UL — SIGNIFICANT CHANGE UP (ref 1.8–7.4)
NEUTROPHILS # BLD AUTO: 4.17 K/UL — SIGNIFICANT CHANGE UP (ref 1.8–7.4)
NEUTROPHILS NFR BLD AUTO: 58.1 % — SIGNIFICANT CHANGE UP (ref 43–77)
NEUTROPHILS NFR BLD AUTO: 64.1 % — SIGNIFICANT CHANGE UP (ref 43–77)
NRBC # BLD: 0 /100 WBCS — SIGNIFICANT CHANGE UP (ref 0–0)
NT-PROBNP SERPL-SCNC: 687 PG/ML — HIGH (ref 0–300)
PCO2 BLDA: 35 MMHG — SIGNIFICANT CHANGE UP (ref 35–48)
PH BLDA: 7.47 — HIGH (ref 7.35–7.45)
PHOSPHATE SERPL-MCNC: 3.3 MG/DL — SIGNIFICANT CHANGE UP (ref 2.5–4.5)
PLATELET # BLD AUTO: 153 K/UL — SIGNIFICANT CHANGE UP (ref 150–400)
PLATELET # BLD AUTO: 171 K/UL — SIGNIFICANT CHANGE UP (ref 150–400)
PLATELET # BLD AUTO: 201 K/UL — SIGNIFICANT CHANGE UP (ref 150–400)
PO2 BLDA: 132 MMHG — HIGH (ref 83–108)
POTASSIUM SERPL-MCNC: 3.9 MMOL/L — SIGNIFICANT CHANGE UP (ref 3.5–5.3)
POTASSIUM SERPL-MCNC: 4 MMOL/L — SIGNIFICANT CHANGE UP (ref 3.5–5.3)
POTASSIUM SERPL-MCNC: 4 MMOL/L — SIGNIFICANT CHANGE UP (ref 3.5–5.3)
POTASSIUM SERPL-SCNC: 3.9 MMOL/L — SIGNIFICANT CHANGE UP (ref 3.5–5.3)
POTASSIUM SERPL-SCNC: 4 MMOL/L — SIGNIFICANT CHANGE UP (ref 3.5–5.3)
POTASSIUM SERPL-SCNC: 4 MMOL/L — SIGNIFICANT CHANGE UP (ref 3.5–5.3)
PROT SERPL-MCNC: 6.5 G/DL — SIGNIFICANT CHANGE UP (ref 6–8.3)
PROT SERPL-MCNC: 7.7 G/DL — SIGNIFICANT CHANGE UP (ref 6–8.3)
PROTHROM AB SERPL-ACNC: 12.1 SEC — SIGNIFICANT CHANGE UP (ref 10–12.9)
PROTHROM AB SERPL-ACNC: 13.2 SEC — HIGH (ref 10–12.9)
PROTHROM AB SERPL-ACNC: 14.1 SEC — HIGH (ref 10–12.9)
RBC # BLD: 3.28 M/UL — LOW (ref 4.2–5.8)
RBC # BLD: 3.38 M/UL — LOW (ref 4.2–5.8)
RBC # BLD: 3.93 M/UL — LOW (ref 4.2–5.8)
RBC # FLD: 13.1 % — SIGNIFICANT CHANGE UP (ref 10.3–14.5)
RBC # FLD: 13.2 % — SIGNIFICANT CHANGE UP (ref 10.3–14.5)
RBC # FLD: 13.2 % — SIGNIFICANT CHANGE UP (ref 10.3–14.5)
SAO2 % BLDA: 99 % — SIGNIFICANT CHANGE UP (ref 95–100)
SODIUM SERPL-SCNC: 141 MMOL/L — SIGNIFICANT CHANGE UP (ref 135–145)
SODIUM SERPL-SCNC: 142 MMOL/L — SIGNIFICANT CHANGE UP (ref 135–145)
SODIUM SERPL-SCNC: 143 MMOL/L — SIGNIFICANT CHANGE UP (ref 135–145)
WBC # BLD: 6.5 K/UL — SIGNIFICANT CHANGE UP (ref 3.8–10.5)
WBC # BLD: 6.66 K/UL — SIGNIFICANT CHANGE UP (ref 3.8–10.5)
WBC # BLD: 8.57 K/UL — SIGNIFICANT CHANGE UP (ref 3.8–10.5)
WBC # FLD AUTO: 6.5 K/UL — SIGNIFICANT CHANGE UP (ref 3.8–10.5)
WBC # FLD AUTO: 6.66 K/UL — SIGNIFICANT CHANGE UP (ref 3.8–10.5)
WBC # FLD AUTO: 8.57 K/UL — SIGNIFICANT CHANGE UP (ref 3.8–10.5)

## 2019-04-02 PROCEDURE — 86900 BLOOD TYPING SEROLOGIC ABO: CPT

## 2019-04-02 PROCEDURE — 85730 THROMBOPLASTIN TIME PARTIAL: CPT

## 2019-04-02 PROCEDURE — 93459 L HRT ART/GRFT ANGIO: CPT

## 2019-04-02 PROCEDURE — 71045 X-RAY EXAM CHEST 1 VIEW: CPT | Mod: 26

## 2019-04-02 PROCEDURE — 83036 HEMOGLOBIN GLYCOSYLATED A1C: CPT

## 2019-04-02 PROCEDURE — C1887: CPT

## 2019-04-02 PROCEDURE — 84100 ASSAY OF PHOSPHORUS: CPT

## 2019-04-02 PROCEDURE — 33361 REPLACE AORTIC VALVE PERQ: CPT | Mod: 62,Q0

## 2019-04-02 PROCEDURE — 83735 ASSAY OF MAGNESIUM: CPT

## 2019-04-02 PROCEDURE — C1889: CPT

## 2019-04-02 PROCEDURE — 85025 COMPLETE CBC W/AUTO DIFF WBC: CPT

## 2019-04-02 PROCEDURE — 83880 ASSAY OF NATRIURETIC PEPTIDE: CPT

## 2019-04-02 PROCEDURE — 80061 LIPID PANEL: CPT

## 2019-04-02 PROCEDURE — 82550 ASSAY OF CK (CPK): CPT

## 2019-04-02 PROCEDURE — 85610 PROTHROMBIN TIME: CPT

## 2019-04-02 PROCEDURE — 82553 CREATINE MB FRACTION: CPT

## 2019-04-02 PROCEDURE — 86850 RBC ANTIBODY SCREEN: CPT

## 2019-04-02 PROCEDURE — 93306 TTE W/DOPPLER COMPLETE: CPT | Mod: 26

## 2019-04-02 PROCEDURE — 94150 VITAL CAPACITY TEST: CPT

## 2019-04-02 PROCEDURE — C1769: CPT

## 2019-04-02 PROCEDURE — C1894: CPT

## 2019-04-02 PROCEDURE — 86901 BLOOD TYPING SEROLOGIC RH(D): CPT

## 2019-04-02 PROCEDURE — 80053 COMPREHEN METABOLIC PANEL: CPT

## 2019-04-02 PROCEDURE — 86923 COMPATIBILITY TEST ELECTRIC: CPT

## 2019-04-02 RX ORDER — FAMOTIDINE 10 MG/ML
20 INJECTION INTRAVENOUS EVERY 12 HOURS
Qty: 0 | Refills: 0 | Status: DISCONTINUED | OUTPATIENT
Start: 2019-04-02 | End: 2019-04-02

## 2019-04-02 RX ORDER — CHLORHEXIDINE GLUCONATE 213 G/1000ML
5 SOLUTION TOPICAL EVERY 4 HOURS
Qty: 0 | Refills: 0 | Status: DISCONTINUED | OUTPATIENT
Start: 2019-04-02 | End: 2019-04-02

## 2019-04-02 RX ORDER — HYDRALAZINE HCL 50 MG
10 TABLET ORAL EVERY 4 HOURS
Qty: 0 | Refills: 0 | Status: DISCONTINUED | OUTPATIENT
Start: 2019-04-02 | End: 2019-04-02

## 2019-04-02 RX ORDER — FINASTERIDE 5 MG/1
5 TABLET, FILM COATED ORAL DAILY
Qty: 0 | Refills: 0 | Status: DISCONTINUED | OUTPATIENT
Start: 2019-04-02 | End: 2019-04-04

## 2019-04-02 RX ORDER — LABETALOL HCL 100 MG
100 TABLET ORAL
Qty: 0 | Refills: 0 | Status: DISCONTINUED | OUTPATIENT
Start: 2019-04-02 | End: 2019-04-04

## 2019-04-02 RX ORDER — CHLORHEXIDINE GLUCONATE 213 G/1000ML
1 SOLUTION TOPICAL DAILY
Qty: 0 | Refills: 0 | Status: DISCONTINUED | OUTPATIENT
Start: 2019-04-02 | End: 2019-04-04

## 2019-04-02 RX ORDER — DOCUSATE SODIUM 100 MG
100 CAPSULE ORAL
Qty: 0 | Refills: 0 | Status: DISCONTINUED | OUTPATIENT
Start: 2019-04-02 | End: 2019-04-04

## 2019-04-02 RX ORDER — ACETAMINOPHEN 500 MG
650 TABLET ORAL EVERY 6 HOURS
Qty: 0 | Refills: 0 | Status: DISCONTINUED | OUTPATIENT
Start: 2019-04-02 | End: 2019-04-04

## 2019-04-02 RX ORDER — CEFAZOLIN SODIUM 1 G
2000 VIAL (EA) INJECTION EVERY 8 HOURS
Qty: 0 | Refills: 0 | Status: COMPLETED | OUTPATIENT
Start: 2019-04-02 | End: 2019-04-03

## 2019-04-02 RX ORDER — SENNA PLUS 8.6 MG/1
2 TABLET ORAL AT BEDTIME
Qty: 0 | Refills: 0 | Status: DISCONTINUED | OUTPATIENT
Start: 2019-04-02 | End: 2019-04-04

## 2019-04-02 RX ORDER — POTASSIUM CHLORIDE 20 MEQ
20 PACKET (EA) ORAL ONCE
Qty: 0 | Refills: 0 | Status: COMPLETED | OUTPATIENT
Start: 2019-04-02 | End: 2019-04-02

## 2019-04-02 RX ORDER — LIDOCAINE 4 G/100G
1 CREAM TOPICAL DAILY
Qty: 0 | Refills: 0 | Status: DISCONTINUED | OUTPATIENT
Start: 2019-04-02 | End: 2019-04-04

## 2019-04-02 RX ORDER — CLOPIDOGREL BISULFATE 75 MG/1
75 TABLET, FILM COATED ORAL DAILY
Qty: 0 | Refills: 0 | Status: DISCONTINUED | OUTPATIENT
Start: 2019-04-03 | End: 2019-04-04

## 2019-04-02 RX ORDER — CLOPIDOGREL BISULFATE 75 MG/1
300 TABLET, FILM COATED ORAL ONCE
Qty: 0 | Refills: 0 | Status: COMPLETED | OUTPATIENT
Start: 2019-04-02 | End: 2019-04-02

## 2019-04-02 RX ORDER — CEFAZOLIN SODIUM 1 G
2000 VIAL (EA) INJECTION EVERY 8 HOURS
Qty: 0 | Refills: 0 | Status: DISCONTINUED | OUTPATIENT
Start: 2019-04-02 | End: 2019-04-02

## 2019-04-02 RX ORDER — PANTOPRAZOLE SODIUM 20 MG/1
40 TABLET, DELAYED RELEASE ORAL
Qty: 0 | Refills: 0 | Status: DISCONTINUED | OUTPATIENT
Start: 2019-04-02 | End: 2019-04-04

## 2019-04-02 RX ORDER — ACETAMINOPHEN 500 MG
1000 TABLET ORAL ONCE
Qty: 0 | Refills: 0 | Status: DISCONTINUED | OUTPATIENT
Start: 2019-04-02 | End: 2019-04-03

## 2019-04-02 RX ORDER — TAMSULOSIN HYDROCHLORIDE 0.4 MG/1
0.4 CAPSULE ORAL AT BEDTIME
Qty: 0 | Refills: 0 | Status: DISCONTINUED | OUTPATIENT
Start: 2019-04-02 | End: 2019-04-04

## 2019-04-02 RX ORDER — LEVOTHYROXINE SODIUM 125 MCG
137 TABLET ORAL DAILY
Qty: 0 | Refills: 0 | Status: DISCONTINUED | OUTPATIENT
Start: 2019-04-02 | End: 2019-04-04

## 2019-04-02 RX ORDER — ASPIRIN/CALCIUM CARB/MAGNESIUM 324 MG
81 TABLET ORAL DAILY
Qty: 0 | Refills: 0 | Status: DISCONTINUED | OUTPATIENT
Start: 2019-04-03 | End: 2019-04-04

## 2019-04-02 RX ORDER — HEPARIN SODIUM 5000 [USP'U]/ML
5000 INJECTION INTRAVENOUS; SUBCUTANEOUS EVERY 8 HOURS
Qty: 0 | Refills: 0 | Status: DISCONTINUED | OUTPATIENT
Start: 2019-04-02 | End: 2019-04-04

## 2019-04-02 RX ORDER — SODIUM CHLORIDE 9 MG/ML
1000 INJECTION INTRAMUSCULAR; INTRAVENOUS; SUBCUTANEOUS
Qty: 0 | Refills: 0 | Status: DISCONTINUED | OUTPATIENT
Start: 2019-04-02 | End: 2019-04-04

## 2019-04-02 RX ORDER — ALBUMIN HUMAN 25 %
250 VIAL (ML) INTRAVENOUS ONCE
Qty: 0 | Refills: 0 | Status: COMPLETED | OUTPATIENT
Start: 2019-04-02 | End: 2019-04-02

## 2019-04-02 RX ORDER — ATORVASTATIN CALCIUM 80 MG/1
80 TABLET, FILM COATED ORAL AT BEDTIME
Qty: 0 | Refills: 0 | Status: DISCONTINUED | OUTPATIENT
Start: 2019-04-02 | End: 2019-04-04

## 2019-04-02 RX ADMIN — Medication 100 MILLIGRAM(S): at 17:34

## 2019-04-02 RX ADMIN — TAMSULOSIN HYDROCHLORIDE 0.4 MILLIGRAM(S): 0.4 CAPSULE ORAL at 21:36

## 2019-04-02 RX ADMIN — HEPARIN SODIUM 5000 UNIT(S): 5000 INJECTION INTRAVENOUS; SUBCUTANEOUS at 21:38

## 2019-04-02 RX ADMIN — SENNA PLUS 2 TABLET(S): 8.6 TABLET ORAL at 21:37

## 2019-04-02 RX ADMIN — CHLORHEXIDINE GLUCONATE 5 MILLILITER(S): 213 SOLUTION TOPICAL at 17:36

## 2019-04-02 RX ADMIN — CLOPIDOGREL BISULFATE 300 MILLIGRAM(S): 75 TABLET, FILM COATED ORAL at 17:36

## 2019-04-02 RX ADMIN — Medication 100 MILLIGRAM(S): at 22:45

## 2019-04-02 RX ADMIN — Medication 20 MILLIEQUIVALENT(S): at 17:35

## 2019-04-02 RX ADMIN — Medication 2000 MILLIGRAM(S): at 15:48

## 2019-04-02 RX ADMIN — Medication 125 MILLILITER(S): at 20:06

## 2019-04-02 RX ADMIN — CHLORHEXIDINE GLUCONATE 5 MILLILITER(S): 213 SOLUTION TOPICAL at 21:38

## 2019-04-02 RX ADMIN — Medication 2000 MILLIGRAM(S): at 22:25

## 2019-04-02 RX ADMIN — CHLORHEXIDINE GLUCONATE 1 APPLICATION(S): 213 SOLUTION TOPICAL at 12:21

## 2019-04-02 RX ADMIN — Medication 10 MILLIGRAM(S): at 11:07

## 2019-04-02 RX ADMIN — ATORVASTATIN CALCIUM 80 MILLIGRAM(S): 80 TABLET, FILM COATED ORAL at 21:38

## 2019-04-02 RX ADMIN — FAMOTIDINE 20 MILLIGRAM(S): 10 INJECTION INTRAVENOUS at 17:36

## 2019-04-02 RX ADMIN — CHLORHEXIDINE GLUCONATE 5 MILLILITER(S): 213 SOLUTION TOPICAL at 14:38

## 2019-04-02 NOTE — H&P ADULT - ASSESSMENT
89 y/o male with PMHYx of CAD s/p CABG x 5(4/6/2015), chronic systolic CHF (EF 44%), s/p PPM (CRT-P 8/3/16) extracted and replaced on 11/2016, YORDY s/p left CEA (12/2014), PVD and abdominal aortic aneurysm (3.6 x 3.2) followed by a vascular surgeon, BPH, HTN and HLD who presented with BRANDT and fatigue and Class II-III NYHA CHF symptoms. He underwent a cardiac cath which confirmed severe AS with a mean gradient of 41mmhg. He was evaluated for SAVR vs TAVR and was seen by Dr. Mccormick and now presents for his planned TAVR    - labs obtained, T&S performed  - blood products on hold for OR  - ASA 81mg taken this AM, Plavix 300mg to be given post op   - chart reviewed, consent obtained  - to 9east/9la post op

## 2019-04-02 NOTE — PROGRESS NOTE ADULT - ASSESSMENT
89 y/o male with PMHYx of CAD s/p CABG x 5(4/6/2015), chronic systolic CHF (EF 44%), s/p PPM (CRT-P 8/3/16) extracted and replaced on 11/2016, YORDY s/p left CEA (12/2014), PVD and abdominal aortic aneurysm (3.6 x 3.2) followed by a vascular surgeon, BPH, HTN and HLD who presented with BRANDT and fatigue and Class II-III NYHA CHF symptoms. He underwent a cardiac cath which confirmed severe AS with a mean gradient of 41mmhg. He was evaluated for SAVR vs TAVR and was seen by Dr. Mccormick and now presents for his planned TAVR.     Plan    NEURO:  - Pain control: IV Tylenol, Oral Tylenol, Lidocaine patch, avoid narcotics d/t age    PULM:  - Procedure with conscious sedation  - Wean O2 as tolerated  - Encourage IS  - Daily CXR     CV:  - s/p Transfemoral TAVR (Jonas)     - Plavix load post op, start ASA 81mg daily/Plavix 75mg daily tomorrow      - 12 lead EKG and TTE ordered for AM     - bilateral groins without hematoma, R wrist with radial band   - Hx PPM      - remains in paced rhythm post operatively      - femoral TVP removed at end of procedure   - hx CABG      - ASA/Plavix, will restart home Labetalol/Lisinopril once BP tolerates and can take PO, Lipitor 80    GI  - NPO while supine  - Pepcid BID        - Urinary retention post op, ingram placed for 1200mL  - BUN/Cr acceptable     HEME  - No active issues    ID   - No active issues    Dispo:   9L, mini-ICU, hopeful d/c tomorrow

## 2019-04-02 NOTE — H&P ADULT - NSHPREVIEWOFSYSTEMS_GEN_ALL_CORE
Review of Systems  CONSTITUTIONAL:  + fatigue, Denies Fevers / chills, sweats, weight loss, weight gain                                      NEURO:  Denies parathesias, seizures, syncope, confusion                                                                                EYES:  Denies Blurry vision, discharge, pain, loss of vision                                                                                    ENMT:  Denies Difficulty hearing, vertigo, dysphagia, epistaxis, recent dental work                                       CV:  +BRANDT, Denies Chest pain, palpitations, , orthopnea                                                                                          RESPIRATORY:  Denies Wheezing, SOB, cough / sputum, hemoptysis                                                                GI:  Denies Nausea, vommiting, diarrhea, constipation, melena, difficulty swallowing                                               : Denies Hematuria, dysuria, urgency, incontinence                                                                                         MUSKULOSKELETAL:  Denies arthritis, joint swelling, muscle weakness                                                             SKIN/BREAST:  Denies rash, itching, felipe loss, masses                                                                                            PSYCH:  Denies depresion, anxiety, suicidal ideation                                                                                               HEME/LYMPH:  Denies bruises easily, enlarged lymph nodes, tender lymph nodes                                        ENDOCRINE:  Denies cold intolerance, heat intolerance, polydipsia

## 2019-04-02 NOTE — H&P ADULT - HISTORY OF PRESENT ILLNESS
87 y/o male with PMHYx of CAD s/p CABG x 5(4/6/2015), chronic systolic CHF (EF 44%), s/p PPM (CRT-P 8/3/16) extracted and replaced on 11/2016, YORDY s/p left CEA (12/2014), PVD and abdominal aortic aneurysm (3.6 x 3.2) followed by a vascular surgeon, BPH, HTN and HLD who presented with BRANDT and fatigue and Class II-III NYHA CHF symptoms. He underwent a cardiac cath which confirmed severe AS with a mean gradient of 41mmhg. He was evaluated for SAVR vs TAVR and was seen by Dr. Mccormick and now presents for his planned TAVR. He took his ASA 81mg this AM, and was seen in SDA in his usual state of health with no acute complaints.

## 2019-04-02 NOTE — H&P ADULT - NSHPPHYSICALEXAM_GEN_ALL_CORE
Physical Exam  CONSTITUTIONAL:                                                              WNL  NEURO:                                                                       WNL                      EYES:                                                                                WNL  ENMT:                                                                               WNL  CV:                                                                                   WNL  RESPIRATORY:                                                                 WNL  GI:                                                                                     WNL  : BECKFORD + / -                                                                  WNL  MUSKULOSKELETAL:                                                       WNL  SKIN / BREAST:                                                                  WNL Physical Exam  CONSTITUTIONAL: NAD, stable   NEURO: A&O x3, no focal defict                 EYES: EOMI, PERRLA   ENMT:  normocephalic, atraumatic, no JVD, no carotid bruit  CV: RRR, normal S1, S2 +SAMIR   RESPIRATORY: CTA b/l  GI: +BS, soft, nontender  : no ingram  MUSKULOSKELETAL: FROM b/l, no joint swelling   SKIN / BREAST: no lacerations/abrasions, old PPM scar, old sternotomy scar  Vasc: no edema, no calf tenderness, +2 pulses b/l UE and LE DP/PT

## 2019-04-02 NOTE — CHART NOTE - NSCHARTNOTEFT_GEN_A_CORE
DESCRIPTION OF THE PROCEDURE:  89 yo M with a hx of CAD s/p CABG x 5(4/6/2015), chronic systolic CHF (EF 44%), s/p PPM (CRT-P 8/3/16) extracted and replaced on 11/2016, YORDY s/p left CEA (12/2014), PVD and abdominal aortic aneurysm (3.6 x 3.2) followed by a vascular surgeon, BPH, HTN and HLD who presented with BRANDT and fatigue and Class II-III NYHA CHF symptoms. He underwent a cardiac cath which confirmed severe AS with a mean gradient of 41mmhg. He was evaluated for SAVR vs TAVR and felt to be high risk for SAVR given age, comorbidities, and reop status.  He presents today for TAVR.  He was consented and brought to the hybrid lab.  Time out was performed. MAC anesthesia performed by anesthesia. Patient was prepped in usual sterile fashion.  6F sheath was inserted into right CFV with TVP placed into RV apex and thresholds confirmed.  6Fr slender sheath was inserted into the R radial artery and and 5F pigtail advanced into the ascending aorta, placed at the level of the non coronary cusp. 8F left CFA sheath was inserted and preclose performed with Proglide system x2. 5Fr pigtail was advanced to the level of the RCC.  Aortography was used to determine coplanar angle. Heparin was administered per laboratory protocol.  The aortic valve was crossed using a 5F AL1 catheter/0.035" straight stiff glidewire, advanced into the LV. Using an exchanged length 0.035" J wire, the AL1 catheter was exchanged for a 5F pigtail. An 0.035" Confida wire was positioned within the LV.  The 8F sheath was upsized over the confida for the 16F Naik E-sheath over a 0.035” Lunderquist wire.   The Commander delivery system was advanced with the 26 mm JONAS III +3 ccs valve positioned across the annulus and deployed under rapid pacing. TTE showed trace PVL and aortogram revealed similar findings.  Hemodynamics demonstrated MG < 4 mmHg.  Protamine was administered and the 16F sheath was removed and proglide sutures secured. A third proglide was delivered to achieve hemostasis.  The R radial artery sheath was removed and hemostasis achieved with a TR band.  TVP and venous sheath was removed and manual pressure held for hemostasis.  The patient tolerated the procedure well.  No complications.    SUMMARY DIAGNOSIS INTERPRETATION:  - Severe symptomatic aortic stenosis with chronic combined systolic and diastolic heart failure s/p successful transfemoral TAVR with a 26 mm Jonas III +3 ccs; HTN; HLD; CAD s/p CABG; PAD    Recommendations:  - Routine post cath care  - TR band off in 3-4 hours  - Aspirin 81 mg daily, load plavix 300 mg and then 75 mg daily  - TTE in AM  - Antibiotics per protocol DESCRIPTION OF THE PROCEDURE:  87 yo M with a hx of CAD s/p CABG x 5(4/6/2015), chronic systolic CHF (EF 44%), s/p PPM (CRT-P 8/3/16) extracted and replaced on 11/2016, YORDY s/p left CEA (12/2014), PVD and abdominal aortic aneurysm (3.6 x 3.2) followed by a vascular surgeon, BPH, HTN and HLD who presented with BRANDT and fatigue and Class II-III NYHA CHF symptoms. He underwent a cardiac cath which confirmed severe AS with a mean gradient of 41mmhg. He was evaluated for SAVR vs TAVR and felt to be high risk for SAVR given age, comorbidities, and reop status.  He presents today for TAVR.  He was consented and brought to the hybrid lab.  Time out was performed. MAC anesthesia performed by anesthesia. Patient was prepped in usual sterile fashion.  6F sheath was inserted into right CFV with TVP placed into RV apex and thresholds confirmed.  6Fr slender sheath was inserted into the R radial artery and and 5F pigtail advanced into the ascending aorta, placed at the level of the non coronary cusp. 8F left CFA sheath was inserted and preclose performed with Proglide system x2. 5Fr pigtail was advanced to the level of the RCC.  Aortography was used to determine coplanar angle. Heparin was administered per laboratory protocol.  The aortic valve was crossed using a 5F AL1 catheter/0.035" straight stiff glidewire, advanced into the LV. Using an exchanged length 0.035" J wire, the AL1 catheter was exchanged for a 5F pigtail. An 0.035" Confida wire was positioned within the LV.  The 8F sheath was upsized over the confida for the 14F Naik E-sheath over a 0.035” Lunderquist wire.   The Commander delivery system was advanced with the 26 mm JONAS III +3 ccs valve positioned across the annulus and deployed under rapid pacing. TTE showed trace PVL and aortogram revealed similar findings.  Hemodynamics demonstrated MG < 4 mmHg.  Protamine was administered and the 16F sheath was removed and proglide sutures secured. A third proglide was delivered to achieve hemostasis.  The R radial artery sheath was removed and hemostasis achieved with a TR band.  TVP and venous sheath was removed and manual pressure held for hemostasis.  The patient tolerated the procedure well.  No complications.    SUMMARY DIAGNOSIS INTERPRETATION:  - Severe symptomatic aortic stenosis with chronic combined systolic and diastolic heart failure s/p successful transfemoral TAVR with a 26 mm Jonas III +3 ccs; HTN; HLD; CAD s/p CABG; PAD    Recommendations:  - Routine post cath care  - TR band off in 3-4 hours  - Aspirin 81 mg daily, load plavix 300 mg and then 75 mg daily  - TTE in AM  - Antibiotics per protocol

## 2019-04-02 NOTE — H&P ADULT - NSHPLABSRESULTS_GEN_ALL_CORE
CT Head:  1.  Cerebral and cerebellar atrophy.    2.  Periventricular white matter hypodensities, nonspecific, differential   diagnostic possibilities include ischemic change, gliosis or   demyelination.    CTA C/A/P    IMPRESSION:     1.  Annulus area is 530 mm2, bi-plane diameter of elliptical cross   section 23x 27 mm  and annulus perimeter is 83 mm.     2. The smallest vessel diameter in the pelvis is 7 mm on the right and 5   mm on the left.      3. The smallest vessel diameter in the axilla is 6 mm on the right and 6   mm on the left.      4. Multiple aneurysms involving the infrarenal abdominal aorta, right   internal iliac and bilateral external iliacs as above.    5. Prior CABG with patent grafts.

## 2019-04-02 NOTE — H&P ADULT - NSICDXPASTSURGICALHX_GEN_ALL_CORE_FT
PAST SURGICAL HISTORY:  H/O inguinal hernia repair     History of CEA (carotid endarterectomy)     History of hip surgery     History of permanent cardiac pacemaker placement     S/P CABG x 5

## 2019-04-02 NOTE — H&P ADULT - NSICDXPASTMEDICALHX_GEN_ALL_CORE_FT
PAST MEDICAL HISTORY:  Aortic aneurysm     Aortic stenosis     BPH (benign prostatic hyperplasia)     CAD in native artery     CHF, chronic     H/O carotid artery stenosis     HLD (hyperlipidemia)     HTN (hypertension)

## 2019-04-03 ENCOUNTER — TRANSCRIPTION ENCOUNTER (OUTPATIENT)
Age: 84
End: 2019-04-03

## 2019-04-03 LAB
ALBUMIN SERPL ELPH-MCNC: 3.9 G/DL — SIGNIFICANT CHANGE UP (ref 3.3–5)
ALP SERPL-CCNC: 54 U/L — SIGNIFICANT CHANGE UP (ref 40–120)
ALT FLD-CCNC: 18 U/L — SIGNIFICANT CHANGE UP (ref 10–45)
ANION GAP SERPL CALC-SCNC: 10 MMOL/L — SIGNIFICANT CHANGE UP (ref 5–17)
APTT BLD: 31.6 SEC — SIGNIFICANT CHANGE UP (ref 27.5–36.3)
AST SERPL-CCNC: 24 U/L — SIGNIFICANT CHANGE UP (ref 10–40)
BILIRUB SERPL-MCNC: 1.6 MG/DL — HIGH (ref 0.2–1.2)
BUN SERPL-MCNC: 17 MG/DL — SIGNIFICANT CHANGE UP (ref 7–23)
CALCIUM SERPL-MCNC: 8.9 MG/DL — SIGNIFICANT CHANGE UP (ref 8.4–10.5)
CHLORIDE SERPL-SCNC: 105 MMOL/L — SIGNIFICANT CHANGE UP (ref 96–108)
CO2 SERPL-SCNC: 26 MMOL/L — SIGNIFICANT CHANGE UP (ref 22–31)
CREAT SERPL-MCNC: 1.11 MG/DL — SIGNIFICANT CHANGE UP (ref 0.5–1.3)
GAS PNL BLDA: SIGNIFICANT CHANGE UP
GLUCOSE SERPL-MCNC: 99 MG/DL — SIGNIFICANT CHANGE UP (ref 70–99)
HCT VFR BLD CALC: 31.1 % — LOW (ref 39–50)
HGB BLD-MCNC: 10.1 G/DL — LOW (ref 13–17)
INR BLD: 1.2 — HIGH (ref 0.88–1.16)
MAGNESIUM SERPL-MCNC: 2.3 MG/DL — SIGNIFICANT CHANGE UP (ref 1.6–2.6)
MCHC RBC-ENTMCNC: 32.4 PG — SIGNIFICANT CHANGE UP (ref 27–34)
MCHC RBC-ENTMCNC: 32.5 GM/DL — SIGNIFICANT CHANGE UP (ref 32–36)
MCV RBC AUTO: 99.7 FL — SIGNIFICANT CHANGE UP (ref 80–100)
NRBC # BLD: 0 /100 WBCS — SIGNIFICANT CHANGE UP (ref 0–0)
PHOSPHATE SERPL-MCNC: 3.6 MG/DL — SIGNIFICANT CHANGE UP (ref 2.5–4.5)
PLATELET # BLD AUTO: 149 K/UL — LOW (ref 150–400)
POTASSIUM SERPL-MCNC: 4 MMOL/L — SIGNIFICANT CHANGE UP (ref 3.5–5.3)
POTASSIUM SERPL-SCNC: 4 MMOL/L — SIGNIFICANT CHANGE UP (ref 3.5–5.3)
PROT SERPL-MCNC: 6.2 G/DL — SIGNIFICANT CHANGE UP (ref 6–8.3)
PROTHROM AB SERPL-ACNC: 13.6 SEC — HIGH (ref 10–12.9)
RBC # BLD: 3.12 M/UL — LOW (ref 4.2–5.8)
RBC # FLD: 13.2 % — SIGNIFICANT CHANGE UP (ref 10.3–14.5)
SODIUM SERPL-SCNC: 141 MMOL/L — SIGNIFICANT CHANGE UP (ref 135–145)
WBC # BLD: 7.23 K/UL — SIGNIFICANT CHANGE UP (ref 3.8–10.5)
WBC # FLD AUTO: 7.23 K/UL — SIGNIFICANT CHANGE UP (ref 3.8–10.5)

## 2019-04-03 PROCEDURE — 71045 X-RAY EXAM CHEST 1 VIEW: CPT | Mod: 26

## 2019-04-03 PROCEDURE — 99222 1ST HOSP IP/OBS MODERATE 55: CPT

## 2019-04-03 PROCEDURE — 93010 ELECTROCARDIOGRAM REPORT: CPT

## 2019-04-03 PROCEDURE — 93306 TTE W/DOPPLER COMPLETE: CPT | Mod: 26

## 2019-04-03 RX ORDER — LABETALOL HCL 100 MG
1 TABLET ORAL
Qty: 60 | Refills: 0 | OUTPATIENT
Start: 2019-04-03 | End: 2019-05-02

## 2019-04-03 RX ORDER — ALBUMIN HUMAN 25 %
250 VIAL (ML) INTRAVENOUS ONCE
Qty: 0 | Refills: 0 | Status: COMPLETED | OUTPATIENT
Start: 2019-04-03 | End: 2019-04-03

## 2019-04-03 RX ORDER — ASPIRIN/CALCIUM CARB/MAGNESIUM 324 MG
1 TABLET ORAL
Qty: 0 | Refills: 0 | COMMUNITY

## 2019-04-03 RX ORDER — SENNA PLUS 8.6 MG/1
2 TABLET ORAL
Qty: 60 | Refills: 0
Start: 2019-04-03 | End: 2019-05-02

## 2019-04-03 RX ORDER — PANTOPRAZOLE SODIUM 20 MG/1
1 TABLET, DELAYED RELEASE ORAL
Qty: 30 | Refills: 0
Start: 2019-04-03 | End: 2019-05-02

## 2019-04-03 RX ORDER — FINASTERIDE 5 MG/1
1 TABLET, FILM COATED ORAL
Qty: 0 | Refills: 0 | COMMUNITY

## 2019-04-03 RX ORDER — CLOPIDOGREL BISULFATE 75 MG/1
1 TABLET, FILM COATED ORAL
Qty: 30 | Refills: 0
Start: 2019-04-03 | End: 2019-05-02

## 2019-04-03 RX ORDER — ATORVASTATIN CALCIUM 80 MG/1
1 TABLET, FILM COATED ORAL
Qty: 0 | Refills: 0 | COMMUNITY

## 2019-04-03 RX ORDER — TAMSULOSIN HYDROCHLORIDE 0.4 MG/1
1 CAPSULE ORAL
Qty: 0 | Refills: 0 | COMMUNITY

## 2019-04-03 RX ORDER — LEVOTHYROXINE SODIUM 125 MCG
1 TABLET ORAL
Qty: 30 | Refills: 0
Start: 2019-04-03 | End: 2019-05-02

## 2019-04-03 RX ORDER — LABETALOL HCL 100 MG
1 TABLET ORAL
Qty: 0 | Refills: 0 | COMMUNITY

## 2019-04-03 RX ORDER — MAGNESIUM SULFATE 500 MG/ML
2 VIAL (ML) INJECTION ONCE
Qty: 0 | Refills: 0 | Status: COMPLETED | OUTPATIENT
Start: 2019-04-03 | End: 2019-04-03

## 2019-04-03 RX ORDER — ATORVASTATIN CALCIUM 80 MG/1
1 TABLET, FILM COATED ORAL
Qty: 30 | Refills: 0
Start: 2019-04-03 | End: 2019-05-02

## 2019-04-03 RX ORDER — ACETAMINOPHEN 500 MG
2 TABLET ORAL
Qty: 240 | Refills: 0
Start: 2019-04-03 | End: 2019-05-02

## 2019-04-03 RX ORDER — ASPIRIN/CALCIUM CARB/MAGNESIUM 324 MG
1 TABLET ORAL
Qty: 30 | Refills: 0
Start: 2019-04-03 | End: 2019-05-02

## 2019-04-03 RX ORDER — LISINOPRIL 2.5 MG/1
1 TABLET ORAL
Qty: 0 | Refills: 0 | COMMUNITY

## 2019-04-03 RX ORDER — FINASTERIDE 5 MG/1
1 TABLET, FILM COATED ORAL
Qty: 30 | Refills: 0
Start: 2019-04-03 | End: 2019-05-02

## 2019-04-03 RX ORDER — SODIUM CHLORIDE 9 MG/ML
3 INJECTION INTRAMUSCULAR; INTRAVENOUS; SUBCUTANEOUS EVERY 8 HOURS
Qty: 0 | Refills: 0 | Status: DISCONTINUED | OUTPATIENT
Start: 2019-04-03 | End: 2019-04-04

## 2019-04-03 RX ORDER — LEVOTHYROXINE SODIUM 125 MCG
1 TABLET ORAL
Qty: 0 | Refills: 0 | COMMUNITY

## 2019-04-03 RX ORDER — TAMSULOSIN HYDROCHLORIDE 0.4 MG/1
1 CAPSULE ORAL
Qty: 30 | Refills: 0
Start: 2019-04-03 | End: 2019-05-02

## 2019-04-03 RX ADMIN — Medication 125 MILLILITER(S): at 01:00

## 2019-04-03 RX ADMIN — ATORVASTATIN CALCIUM 80 MILLIGRAM(S): 80 TABLET, FILM COATED ORAL at 22:21

## 2019-04-03 RX ADMIN — Medication 137 MICROGRAM(S): at 05:27

## 2019-04-03 RX ADMIN — Medication 50 GRAM(S): at 00:00

## 2019-04-03 RX ADMIN — Medication 2000 MILLIGRAM(S): at 14:28

## 2019-04-03 RX ADMIN — HEPARIN SODIUM 5000 UNIT(S): 5000 INJECTION INTRAVENOUS; SUBCUTANEOUS at 14:00

## 2019-04-03 RX ADMIN — SODIUM CHLORIDE 3 MILLILITER(S): 9 INJECTION INTRAMUSCULAR; INTRAVENOUS; SUBCUTANEOUS at 13:38

## 2019-04-03 RX ADMIN — Medication 125 MILLILITER(S): at 02:27

## 2019-04-03 RX ADMIN — Medication 100 MILLIGRAM(S): at 18:52

## 2019-04-03 RX ADMIN — FINASTERIDE 5 MILLIGRAM(S): 5 TABLET, FILM COATED ORAL at 11:45

## 2019-04-03 RX ADMIN — HEPARIN SODIUM 5000 UNIT(S): 5000 INJECTION INTRAVENOUS; SUBCUTANEOUS at 22:21

## 2019-04-03 RX ADMIN — SENNA PLUS 2 TABLET(S): 8.6 TABLET ORAL at 22:21

## 2019-04-03 RX ADMIN — Medication 2000 MILLIGRAM(S): at 22:00

## 2019-04-03 RX ADMIN — PANTOPRAZOLE SODIUM 40 MILLIGRAM(S): 20 TABLET, DELAYED RELEASE ORAL at 06:27

## 2019-04-03 RX ADMIN — SODIUM CHLORIDE 3 MILLILITER(S): 9 INJECTION INTRAMUSCULAR; INTRAVENOUS; SUBCUTANEOUS at 22:00

## 2019-04-03 RX ADMIN — Medication 100 MILLIGRAM(S): at 09:50

## 2019-04-03 RX ADMIN — TAMSULOSIN HYDROCHLORIDE 0.4 MILLIGRAM(S): 0.4 CAPSULE ORAL at 22:21

## 2019-04-03 RX ADMIN — Medication 2000 MILLIGRAM(S): at 06:00

## 2019-04-03 RX ADMIN — Medication 81 MILLIGRAM(S): at 11:45

## 2019-04-03 RX ADMIN — HEPARIN SODIUM 5000 UNIT(S): 5000 INJECTION INTRAVENOUS; SUBCUTANEOUS at 05:26

## 2019-04-03 RX ADMIN — Medication 100 MILLIGRAM(S): at 05:27

## 2019-04-03 RX ADMIN — CLOPIDOGREL BISULFATE 75 MILLIGRAM(S): 75 TABLET, FILM COATED ORAL at 11:45

## 2019-04-03 NOTE — DISCHARGE NOTE PROVIDER - HOSPITAL COURSE
89 y/o male with PMHx of CAD s/p CABG x 5(4/6/2015), chronic systolic CHF (EF 44%), s/p PPM (CRT-P 8/3/16) extracted and replaced on 11/2016, YORDY s/p left CEA (12/2014), PVD and abdominal aortic aneurysm (3.6 x 3.2) followed by a vascular surgeon, BPH, HTN and HLD who presented with BRANDT and fatigue and Class II-III NYHA CHF symptoms. He underwent a cardiac cath which confirmed severe AS with a mean gradient of 41mmhg. He was evaluated for SAVR vs TAVR and was seen by Dr. Mccormick.  On 4/2/19 presented as a SDA for planned TF TAVR with Jonas valve done under conscious sedation.  Procedure uncomplicated.  He had an uneventful recovery in ICU and was transferred to floor care on POD 1.  Post op echo done revealed... He is now clinically stable to be discharged home, tolerating ASA, plavix, and labetalol.  Of note, ingram placed post procedure for urinary retention, proscar and flomax resumed, and patient passed TOV on POD 1.         35 minutes was spent with the patient reviewing the discharge material including medications, follow up appointments, recovery, concerning symptoms, and how to contact their health care providers if they have questions 87 y/o male with PMHx of CAD s/p CABG x 5(4/6/2015), chronic systolic CHF (EF 44%), s/p PPM (CRT-P 8/3/16) extracted and replaced on 11/2016, YORDY s/p left CEA (12/2014), PVD and abdominal aortic aneurysm (3.6 x 3.2) followed by a vascular surgeon, BPH, HTN and HLD who presented with BRANDT and fatigue and Class II-III NYHA CHF symptoms. He underwent a cardiac cath which confirmed severe AS with a mean gradient of 41mmhg. He was evaluated for SAVR vs TAVR and was seen by Dr. Mccormick.  On 4/2/19 presented as a SDA for planned TF TAVR with Jonas valve done under conscious sedation.  Procedure uncomplicated.  He had an uneventful recovery in ICU and was transferred to floor care on POD 1.  Post op echo done on 4/3/19 revealed TAVR 26mm Jonas, normally functioning, peak transvalvular velocity is 2.1m/s, mean gradient is 11.8mmHg, LVOT/AV velocity ratio is 0.33, and NATE is 2.4cm2. Of note, ingram was placed post procedure for urinary retention, Proscar and Flomax resumed, he passed trial of voids, however had high post volume residual and ingram was replaced. The plan is that he will go home with a ingram and leg-bag and will follow up for repeat TOV with outpatient urologist. Per Dr. Mccormick, patient is now clinically stable to be discharged home, tolerating ASA, plavix, statin and labetalol.          35 minutes was spent with the patient reviewing the discharge material including medications, follow up appointments, recovery, concerning symptoms, and how to contact their health care providers if they have questions

## 2019-04-03 NOTE — DISCHARGE NOTE PROVIDER - CARE PROVIDERS DIRECT ADDRESSES
[email or web address],DirectAddress_Unknown [email or web address],DirectAddress_Unknown,DirectAddress_Unknown

## 2019-04-03 NOTE — PROGRESS NOTE ADULT - ASSESSMENT
1. Severe aortic Stenosis: 04/02/19 s/p successful TAVR without complications 1. Severe aortic Stenosis: 04/02/19 s/p successful TAVR without complications: s/p TTE today with normally functioning TAVR        - d/c home today        - has f/u with me in office on 04/09        - continue antiplatelet therapy per Dr. Mccormick

## 2019-04-03 NOTE — DISCHARGE NOTE PROVIDER - CARE PROVIDER_API CALL
John Mccormick)  Cardiology; Interventional Cardiology  [address], [address]th [place] [address]  Phone: [phone]  Fax: [phone]  Follow Up Time:     Segundo Martins)  Cardiology  [address] [place] [address], [address]rd Floor  [place], [place] [id number]  Phone: [phone]  Fax: [phone]  Follow Up Time: John Mccormick)  Cardiology; Interventional Cardiology  [address], [address]th [place] [address]  Phone: [phone]  Fax: [phone]  Follow Up Time:     Segundo Martins)  Cardiology  [address] [place] [address], 3rd Floor  [place], [place] [id number]  Phone: [phone]  Fax: [phone]  Follow Up Time:     Ming Navarro)  Urology  [address] [facility], [address]  [place] [address]  Phone: [phone]  Fax: [phone]  Follow Up Time:

## 2019-04-03 NOTE — PHYSICAL THERAPY INITIAL EVALUATION ADULT - GAIT DEVIATIONS NOTED, PT EVAL
initial 5' with L hand held assist and min assist, highly unsteady with increased lateral sway; with rolling walker sig steadiness pain B knees from prior to arrival

## 2019-04-03 NOTE — DISCHARGE NOTE PROVIDER - PROVIDER TOKENS
PROVIDER:[TOKEN:[[id number]]],PROVIDER:[TOKEN:[[id number]]] PROVIDER:[TOKEN:[[id number]]],PROVIDER:[TOKEN:[[id number]]],PROVIDER:[TOKEN:[[id number]]]

## 2019-04-03 NOTE — CHART NOTE - NSCHARTNOTEFT_GEN_A_CORE
Patient passed TOV, voided 100 cc, but  cc.  Not distended, no pain.  Will hold off on placing ingram, encouraged patient to continue to drink fluids, ambulate, and attempt to void.  Will continue to monitor overnight and re-evaluate in am for possible discharge.

## 2019-04-03 NOTE — DISCHARGE NOTE PROVIDER - NSDCFUADDAPPT_GEN_ALL_CORE_FT
-Please follow up with Dr. Mccormick on 4/10/19 at 10:30 am.  The office is located at [address] [place] [address], [address], [place] [address].  Please call the office with any questions [phone].    -Please follow up with Dr. Martins, cardiology, on 4/9/19 at 10:30 am.  The office is located at [address] [place] [address], [address] Floor, [place] [address].  Please call with questions [phone]. -Please follow up with Dr. Mccormick on 4/10/19 at 10:30 am.  The office is located at [address] [place] [address], [address], [place] [address].  Please call the office with any questions [phone].    -Please follow up with Dr. Martins, cardiology, on 4/9/19 at 10:30 am.  The office is located at [address] [place] [address], [address] Floor, [place] [address].  Please call with questions [phone].    - You are being discharged with a ingram catheter and leg bag, please follow up with your Urologist, Dr. Hermelindo Navarro on Monday 4/8/19 at 3:30PM. At your follow up appointment, the ingram catheter will likely be removed and you will have another Trial of Voids.

## 2019-04-03 NOTE — DISCHARGE NOTE PROVIDER - NSDCFUADDINST_GEN_ALL_CORE_FT
-Walk daily as tolerated and use your incentive spirometer every hour.    -No driving or strenuous activity/exercise for 6 weeks, or until cleared by your surgeon.    -Gently clean your incisions with anti-bacterial soap and water, pat dry.  You may leave them open to air.    -Call your doctor if you have shortness of breath, chest pain not relieved by pain medication, dizziness, fever >101.5, or increased redness or drainage from incisions.

## 2019-04-03 NOTE — PHYSICAL THERAPY INITIAL EVALUATION ADULT - PERTINENT HX OF CURRENT PROBLEM, REHAB EVAL
88M who presented with BRANDT and fatigue and Class II-III NYHA CHF symptoms. He underwent a cardiac cath which confirmed severe AS with a mean gradient of 41mmhg. He was evaluated for SAVR vs TAVR and was seen by Dr. Mccormick and now presents for his planned TAVR.

## 2019-04-03 NOTE — PROGRESS NOTE ADULT - ASSESSMENT
-Please follow up with Dr. Mccormick on 4/10/19 at 10:30 am.  The office is located at [address] [place] [address], [address], [place] [address].  Please call the office with any questions [phone].    -Please follow up with Dr. Martins, cardiology, on 4/9/19 at 10:30 am.  The office is located at [address] [place] [address], [address] Floor, [place] [address].  Please call with questions [phone].

## 2019-04-04 ENCOUNTER — TRANSCRIPTION ENCOUNTER (OUTPATIENT)
Age: 84
End: 2019-04-04

## 2019-04-04 VITALS — SYSTOLIC BLOOD PRESSURE: 156 MMHG | DIASTOLIC BLOOD PRESSURE: 62 MMHG | HEART RATE: 70 BPM

## 2019-04-04 LAB
ANION GAP SERPL CALC-SCNC: 10 MMOL/L — SIGNIFICANT CHANGE UP (ref 5–17)
BUN SERPL-MCNC: 18 MG/DL — SIGNIFICANT CHANGE UP (ref 7–23)
CALCIUM SERPL-MCNC: 9.6 MG/DL — SIGNIFICANT CHANGE UP (ref 8.4–10.5)
CHLORIDE SERPL-SCNC: 104 MMOL/L — SIGNIFICANT CHANGE UP (ref 96–108)
CO2 SERPL-SCNC: 25 MMOL/L — SIGNIFICANT CHANGE UP (ref 22–31)
CREAT SERPL-MCNC: 1.04 MG/DL — SIGNIFICANT CHANGE UP (ref 0.5–1.3)
GLUCOSE SERPL-MCNC: 107 MG/DL — HIGH (ref 70–99)
HCT VFR BLD CALC: 34.2 % — LOW (ref 39–50)
HGB BLD-MCNC: 11.4 G/DL — LOW (ref 13–17)
MAGNESIUM SERPL-MCNC: 2 MG/DL — SIGNIFICANT CHANGE UP (ref 1.6–2.6)
MCHC RBC-ENTMCNC: 33.3 GM/DL — SIGNIFICANT CHANGE UP (ref 32–36)
MCHC RBC-ENTMCNC: 33.3 PG — SIGNIFICANT CHANGE UP (ref 27–34)
MCV RBC AUTO: 100 FL — SIGNIFICANT CHANGE UP (ref 80–100)
NRBC # BLD: 0 /100 WBCS — SIGNIFICANT CHANGE UP (ref 0–0)
PHOSPHATE SERPL-MCNC: 2.7 MG/DL — SIGNIFICANT CHANGE UP (ref 2.5–4.5)
PLATELET # BLD AUTO: 133 K/UL — LOW (ref 150–400)
POTASSIUM SERPL-MCNC: 4.1 MMOL/L — SIGNIFICANT CHANGE UP (ref 3.5–5.3)
POTASSIUM SERPL-SCNC: 4.1 MMOL/L — SIGNIFICANT CHANGE UP (ref 3.5–5.3)
RBC # BLD: 3.42 M/UL — LOW (ref 4.2–5.8)
RBC # FLD: 13.1 % — SIGNIFICANT CHANGE UP (ref 10.3–14.5)
SODIUM SERPL-SCNC: 139 MMOL/L — SIGNIFICANT CHANGE UP (ref 135–145)
WBC # BLD: 7.76 K/UL — SIGNIFICANT CHANGE UP (ref 3.8–10.5)
WBC # FLD AUTO: 7.76 K/UL — SIGNIFICANT CHANGE UP (ref 3.8–10.5)

## 2019-04-04 PROCEDURE — 99239 HOSP IP/OBS DSCHRG MGMT >30: CPT

## 2019-04-04 PROCEDURE — 71045 X-RAY EXAM CHEST 1 VIEW: CPT | Mod: 26

## 2019-04-04 RX ORDER — LABETALOL HCL 100 MG
100 TABLET ORAL ONCE
Qty: 0 | Refills: 0 | Status: COMPLETED | OUTPATIENT
Start: 2019-04-04 | End: 2019-04-04

## 2019-04-04 RX ORDER — LABETALOL HCL 100 MG
200 TABLET ORAL EVERY 12 HOURS
Qty: 0 | Refills: 0 | Status: DISCONTINUED | OUTPATIENT
Start: 2019-04-04 | End: 2019-04-04

## 2019-04-04 RX ORDER — LABETALOL HCL 100 MG
1 TABLET ORAL
Qty: 60 | Refills: 0
Start: 2019-04-04 | End: 2019-05-03

## 2019-04-04 RX ADMIN — SODIUM CHLORIDE 3 MILLILITER(S): 9 INJECTION INTRAMUSCULAR; INTRAVENOUS; SUBCUTANEOUS at 05:21

## 2019-04-04 RX ADMIN — HEPARIN SODIUM 5000 UNIT(S): 5000 INJECTION INTRAVENOUS; SUBCUTANEOUS at 05:26

## 2019-04-04 RX ADMIN — Medication 137 MICROGRAM(S): at 05:25

## 2019-04-04 RX ADMIN — CHLORHEXIDINE GLUCONATE 1 APPLICATION(S): 213 SOLUTION TOPICAL at 11:49

## 2019-04-04 RX ADMIN — Medication 81 MILLIGRAM(S): at 11:39

## 2019-04-04 RX ADMIN — Medication 100 MILLIGRAM(S): at 05:25

## 2019-04-04 RX ADMIN — Medication 100 MILLIGRAM(S): at 05:52

## 2019-04-04 RX ADMIN — PANTOPRAZOLE SODIUM 40 MILLIGRAM(S): 20 TABLET, DELAYED RELEASE ORAL at 07:30

## 2019-04-04 RX ADMIN — FINASTERIDE 5 MILLIGRAM(S): 5 TABLET, FILM COATED ORAL at 11:39

## 2019-04-04 RX ADMIN — CLOPIDOGREL BISULFATE 75 MILLIGRAM(S): 75 TABLET, FILM COATED ORAL at 11:39

## 2019-04-04 NOTE — DISCHARGE NOTE NURSING/CASE MANAGEMENT/SOCIAL WORK - NSDCFUADDAPPT_GEN_ALL_CORE_FT
-Please follow up with Dr. Mccormick on 4/10/19 at 10:30 am.  The office is located at [address] [place] [address], [address], [place] [address].  Please call the office with any questions [phone].    -Please follow up with Dr. Martins, cardiology, on 4/9/19 at 10:30 am.  The office is located at [address] [place] [address], [address] Floor, [place] [address].  Please call with questions [phone].    - You are being discharged with a ingram catheter and leg bag, please follow up with your Urologist, Dr. Hermelindo Navarro on Monday 4/8/19 at 3:30PM. At your follow up appointment, the ingram catheter will likely be removed and you will have another Trial of Voids.

## 2019-04-04 NOTE — PROGRESS NOTE ADULT - ASSESSMENT
-Please follow up with Dr. Mccormick on 4/10/19 at 10:30 am.  The office is located at [address] [facility], [address], [place] [address].  Please call the office with any questions [phone].  -Please follow up with Dr. Martins, cardiology, on 4/9/19 at 10:30 am.  The office is located at [address] [place] [address], [address] Floor, [place] [address].  Please call with questions [phone].  - You are being discharged with a ingram catheter and leg bag, please follow up with your Urologist, Dr. Hermelindo Navarro on Monday 4/8/19 at 3:30PM. At your follow up appointment, the ingram catheter will likely be removed and you will have another Trial of Voids.  -Walk daily as tolerated and use your incentive spirometer every hour.  -No driving or strenuous activity/exercise for 6 weeks, or until cleared by your surgeon.  -Gently clean your incisions with anti-bacterial soap and water, pat dry.  You may leave them open to air.  -Call your doctor if you have shortness of breath, chest pain not relieved by pain medication, dizziness, fever >101.5, or increased redness or drainage from incisions.

## 2019-04-04 NOTE — DISCHARGE NOTE NURSING/CASE MANAGEMENT/SOCIAL WORK - NSDCDPATPORTLINK_GEN_ALL_CORE
You can access the [facility] Patient Portal, offered by [facility], by registering with the following website: [email or web address]

## 2019-04-05 PROBLEM — Z86.79 PERSONAL HISTORY OF OTHER DISEASES OF THE CIRCULATORY SYSTEM: Chronic | Status: ACTIVE | Noted: 2019-04-02

## 2019-04-05 PROBLEM — I71.9 AORTIC ANEURYSM OF UNSPECIFIED SITE, WITHOUT RUPTURE: Chronic | Status: ACTIVE | Noted: 2019-04-02

## 2019-04-05 PROBLEM — I25.10 ATHEROSCLEROTIC HEART DISEASE OF NATIVE CORONARY ARTERY WITHOUT ANGINA PECTORIS: Chronic | Status: ACTIVE | Noted: 2019-04-02

## 2019-04-05 PROBLEM — N40.0 BENIGN PROSTATIC HYPERPLASIA WITHOUT LOWER URINARY TRACT SYMPTOMS: Chronic | Status: ACTIVE | Noted: 2019-04-02

## 2019-04-05 PROBLEM — E78.5 HYPERLIPIDEMIA, UNSPECIFIED: Chronic | Status: ACTIVE | Noted: 2019-04-02

## 2019-04-05 PROBLEM — I10 ESSENTIAL (PRIMARY) HYPERTENSION: Chronic | Status: ACTIVE | Noted: 2019-04-02

## 2019-04-05 PROBLEM — I35.0 NONRHEUMATIC AORTIC (VALVE) STENOSIS: Chronic | Status: ACTIVE | Noted: 2019-04-02

## 2019-04-05 PROBLEM — I50.9 HEART FAILURE, UNSPECIFIED: Chronic | Status: ACTIVE | Noted: 2019-04-02

## 2019-04-05 RX ORDER — NITROGLYCERIN 0.4 MG/1
0.4 TABLET SUBLINGUAL
Refills: 0 | Status: DISCONTINUED | COMMUNITY
End: 2019-04-05

## 2019-04-05 RX ORDER — FINASTERIDE 5 MG/1
5 TABLET, FILM COATED ORAL DAILY
Refills: 0 | Status: ACTIVE | COMMUNITY

## 2019-04-05 RX ORDER — TAMSULOSIN HYDROCHLORIDE 0.4 MG/1
0.4 CAPSULE ORAL
Qty: 90 | Refills: 3 | Status: ACTIVE | COMMUNITY

## 2019-04-05 RX ORDER — LISINOPRIL 40 MG/1
40 TABLET ORAL DAILY
Qty: 90 | Refills: 3 | Status: DISCONTINUED | COMMUNITY
End: 2019-04-05

## 2019-04-05 RX ORDER — FUROSEMIDE 80 MG/1
80 TABLET ORAL DAILY
Qty: 90 | Refills: 3 | Status: DISCONTINUED | COMMUNITY
End: 2019-04-05

## 2019-04-08 ENCOUNTER — APPOINTMENT (OUTPATIENT)
Age: 84
End: 2019-04-08

## 2019-04-08 VITALS
SYSTOLIC BLOOD PRESSURE: 120 MMHG | HEART RATE: 70 BPM | TEMPERATURE: 98 F | DIASTOLIC BLOOD PRESSURE: 80 MMHG | RESPIRATION RATE: 16 BRPM | OXYGEN SATURATION: 97 %

## 2019-04-08 DIAGNOSIS — R97.20 BENIGN PROSTATIC HYPERPLASIA WITHOUT LOWER URINARY TRACT SYMPMS: ICD-10-CM

## 2019-04-08 DIAGNOSIS — N40.0 BENIGN PROSTATIC HYPERPLASIA WITHOUT LOWER URINARY TRACT SYMPMS: ICD-10-CM

## 2019-04-08 NOTE — PLAN
[FreeTextEntry1] : s/p TAVR- c/w current medication regimen, Incentive Spirometer use, follow up with Dr Mccormick on 4/10 and Dr . Finkelstein on 4/9\par BPH- follow up with urology today

## 2019-04-08 NOTE — HISTORY OF PRESENT ILLNESS
[FreeTextEntry1] : Patient seen at home s/p recent TAVR by Dr Mccormick, patient temporarily homebound due to surgical procedure [de-identified] : Patient is an 87 y/o male enrolled in [facility]  with PMH of CAD s/p CABG x 5(4/6/2015), chronic systolic CHF (EF 44%), s/p PPM (CRT-P 8/3/16) extracted and replaced on 11/2016, YORDY s/p left CEA (12/2014), PVD and abdominal aortic aneurysm (3.6 x 3.2) followed by a vascular surgeon, BPH, HTN and HLD who presented with BRANDT and fatigue and Class II-III NYHA CHF symptoms, patient underwent a TAVR on 4//2/19 ,was found stable for discharge by the CTU team with [facility] and a ingram cath for urinary retention. on arrival to home patient denies c/p, paoin, sob, wt gain or blle swelling. Was seen this morning by urology and ingram removed void trial being attempted and following up again later today.

## 2019-04-08 NOTE — COUNSELING
[Low Salt Diet] : Low salt diet [de-identified] : Pt advised of importance of daily weights. Pt instructed on how to use incentive spirometer every hour, demonstrated proper use. Also advised to cleanse incisions daily with mild soap and water and to avoid lotions, powders, ointments or creams near or on the incision. Low salt, low fat diet encouraged and discussed. Pt advised to avoid heavy lifting or straining. Medications reviewed and questions answered. Pt has all meds in home and is taking them as prescribed. Teach back completed.   NP/CCC roles explained with pt understanding, contact information provided. Pt agrees to call with any questions, issues or concerns.  Worsening symptoms reviewed with patient understanding\par \par \par \par

## 2019-04-08 NOTE — PHYSICAL EXAM
[No Acute Distress] : no acute distress [Well-Appearing] : well-appearing [Normal Sclera/Conjunctiva] : normal sclera/conjunctiva [No JVD] : no jugular venous distention [Supple] : supple [No Respiratory Distress] : no respiratory distress  [Clear to Auscultation] : lungs were clear to auscultation bilaterally [No Accessory Muscle Use] : no accessory muscle use [Normal Rate] : normal rate  [Regular Rhythm] : with a regular rhythm [Normal S1, S2] : normal S1 and S2 [Pedal Pulses Present] : the pedal pulses are present [No Edema] : there was no peripheral edema [Soft] : abdomen soft [Non Tender] : non-tender [Non-distended] : non-distended [Normal Bowel Sounds] : normal bowel sounds [Grossly Normal Strength/Tone] : grossly normal strength/tone [No Rash] : no rash [Normal Gait] : normal gait [No Focal Deficits] : no focal deficits [Normal Affect] : the affect was normal [Alert and Oriented x3] : oriented to person, place, and time [Normal Insight/Judgement] : insight and judgment were intact [de-identified] : surgical sites: intact

## 2019-04-09 ENCOUNTER — APPOINTMENT (OUTPATIENT)
Dept: HEART AND VASCULAR | Facility: CLINIC | Age: 84
End: 2019-04-09
Payer: MEDICARE

## 2019-04-09 VITALS
WEIGHT: 175 LBS | BODY MASS INDEX: 26.52 KG/M2 | HEART RATE: 70 BPM | SYSTOLIC BLOOD PRESSURE: 162 MMHG | HEIGHT: 68 IN | DIASTOLIC BLOOD PRESSURE: 76 MMHG | TEMPERATURE: 97.4 F | OXYGEN SATURATION: 97 %

## 2019-04-09 VITALS — SYSTOLIC BLOOD PRESSURE: 148 MMHG | DIASTOLIC BLOOD PRESSURE: 79 MMHG

## 2019-04-09 DIAGNOSIS — R06.02 SHORTNESS OF BREATH: ICD-10-CM

## 2019-04-09 PROCEDURE — 99215 OFFICE O/P EST HI 40 MIN: CPT

## 2019-04-09 NOTE — ASSESSMENT
[FreeTextEntry1] : 1. CAD: s/p CABG x 5 (04/06/15) (Malachi Jose MD), s/p cath 03/26/19: RA mid 50%, distal 80%, RDPA competitive flow from graft, LM 40%, LAD prox 100%, Lcx prox 50%, OM1 fills via graft, OM2 60%, LIMA-LAD patent, radial-LPL patent, SVG-diagonal patent, SVG-OM patent, SVG-RPDA patent, AV MG 41mmHg, CCS 0\par             - continue aspirin 81mg po daily\par             - continue Nitroglycerin Tablet Sublingual, 0.4 MG, Sublingual, Once a day as needed\par             - will pursue aggressive medical management\par  \par 2. Chronic systolic heart failure: EF improved: s/p echo: 04/03/19: EF 54%, LVH, akinesis basal and mid IL, PPM, s/p 26mm TAVR (PV 2.1m/s, MG 11.8mmHg, LVOT/AV 0.33, NATE 2.4cm2). \par             - continue prn loop diuretics, lasix 80mg po daily prn \par             - discussed with patient importance of maintaining a weight log, avoidance of dietary sodium, as well as appropriate use of loop diuretics\par             - will follow with serial echocardiograms \par \par 3. Carotid stenosis: s/p left CEA 12/14/04 (Kareem Swain MD), 11/15/17: sono: EMI 40-59%, left CEA patent\par             - will monitor with periodic carotid sonograms (performed at the vascular surgeon's office)\par             - continue statin and anti-platelet agent\par             - discussed with patient surveillance for neurologic symptoms. \par \par 4. Aortic stenosis: s/p successful 26mm Jonas III TAVR 04/09/19 (John Mccormick MD)\par             - will follow with serial echocardiograms \par             - continue DAPT (duration per interventionalist)\par  \par 5. Aortic aneurysm, abdominal: s/p sono: mid 3.6cm x 3.2cm with b/l SLADE aneurysms, RCIA 2.5cm x 2.3cm, LCIA 1.9cm x 1.7cm (11/15/17)\par             - follow up with vascular surgeon, Kareem Swain MD. \par             - continue HTN management\par \par 6. HTN: BP not at ACC/AHA 2017 guideline target, ? occasional low reading on ambulatory log\par             - continue labetalol 200mg po bid (hold evening dose if SBP <120)  \par \par 7. BPH (benign prostatic hyperplasia): urinary retention post TAVR\par             - continue Finasteride Tablet, 5 MG, 1 tablet, Orally, Once a day\par             - continue Tamsulosin HCl Capsule, 0.4 MG, 1 capsule 30 minutes after the same meal each day, Orally, Once a day\par             - f/u with urologist and pursue void trial this Monday\par             - will consider Laser TURP post DAPT \par \par 8. Presence of cardiac pacemaker: s/p CRT-P, upgraded from PPM 09/2016 complicated by hematoma requiring device extraction and replacement on the right side (11/2016).\par             - continue follow up with device clinic. (will enroll in [facility] device clinic)\par \par 9. Dyslipidemia:\par             - continue atorvastatin 80mg po daily

## 2019-04-09 NOTE — REASON FOR VISIT
[FreeTextEntry1] : Diagnostic Tests:\par -----------------------------------\par ECG:\par 02/07/19: A-sensed, V-paced at 70 bpm. \par 01/09/18: A-sensed, V-paced at 70 bpm. \par 05/12/16: A-sensed, V-paced at 62 BPM. \par 03/24/15: A-paced, V-paced at 64bpm\par 08/28/13: A-paced, V-sensed, RBBB, LAFB, prolonged A paced to V-sensed delay (288ms)\par -----------------------------------\par CTCA:\par 03/19/19: moderately calcified aortic valve, severe TVD, LIMA-LAD patent, SVG-D1 patent, RA-RPL patent, SVG-OM patent, SCG-RPDA patent, \par 02/24/15: Ca+ score 1027.41 (75th percentile), LM distal 50%, LAD prox 75%, LCX moderate, RCA moderate, PDA 50%, EF 56%, no RWMA\par ----------------------------\par Stress:\par 10/04/17: regadenoson MPI: EF 47%, normal perfusion.\par 03/03/15: exercise MPI: 7 METS, EF 54%, fixed inferior and inferoseptal defects\par 02/29/12: exercise MPI: 7 METS, mild inferior and inferoseptal scar, no ischemia, EF 54%, no RWMA\par 03/02/09: exercise MPI: 7 METS, mild inferior and inferoseptal scar, no ischemia, EF 54%, no RWMA\par ----------------------------\par Echo:\par 04/03/19: EF 54%, LVH, akinesis basal and mid IL, PPM, s/p 26mm TAVR (PV 2.1m/s, MG 11.8mmHg, LVOT/AV 0.33, NATE 2.4cm2). \par 02/12/19: E 44%, akinesis basal/mid IL, LVH, dilated LA/RA, normal RV function, severe AS (NATE 0.75cm2, PV 3.6m/s, MG 31mmHg, LVOT/AV 0.25, SVI 22ml/m2), PPM, atrial septal aneurysm.\par 10/05/17: EF 38%, grade I diastolic dysfunction, reduced RV function, akinesis apical septum and basal/mid inferolateral walls, severe AS, NATE 0.8cm2, LVOT/AV 0.23, mean gradient 23mmHg. \par 05/12/16: EF 36%, akinesis apical anterior, apical inferior and apical walls, moderate AS, NATE 1.2cm2, moderate TR, mild MR, LVH\par 04/02/15: EF 41%, mid LAD MI, mild AS, NATE 1.7cm2, grade I diastolic dysfunction, dilated LA, mild TR, PASP 36mmHg\par 02/28/12: EF 55%, mild TR, diastolic dysfunction\par 02/14/09: EF 55%, aortic sclerosis, diastolic dysfunction\par -----------------------------\par Tilt table test:\par 08/14/13: + orthostatic induced vasovagal symptoms\par ----------------------------\par Carotid:\par 03/19/19: sono: 20-39% b/l ICA.\par 11/15/17: sono: EMI 40-59%, s/p left CEA patent\par 04/03/15: sono: s/p left CEA, LICA 20-49%, EMI 50-69%\par ----------------------------\par Cath:\par 04/02/19: successful 26mm Jonas III TAVR\par 03/26/19: RA mid 50%, distal 80%, RDPA competitive flow from graft, LM 40%, LAD prox 100%, Lcx prox 50%, OM1 fills via graft, OM2 60%, LIMA-LAD patent, radial-LPL patent, SVG-diagonal patent, SVG-OM patent, SVG-RPDA patent, AV MG 41mmHg. \par 03/16/15: EF 50%, LAD MI, LM distal 40%, LAD prox 95%, mid 80%, D3 50%, LCX prox 50%, distal 90%, LPL ostium 80%, RCA mid 40%, pDA 100% with collaterals from LAD\par ----------------------------\par Aorto-Iliac:\par 11/15/17: sono: AAA mid 3.6cm x 3.2cm, RCIA 2.5cm x 2.3cm. LCIA 1.9cm x 1.7cm.\par ----------------------------\par Upper extremity veins:\par 11/17/16: sono: right: no RUE DVT.

## 2019-04-09 NOTE — HISTORY OF PRESENT ILLNESS
[FreeTextEntry1] : Mr. 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 He underwent 5-vessel coronary artery bypass grafting on 04/06/15. He had an echocardiogram on 05/12/16 which revealed an EF of 36%, akinesis of the apical anterior, apical inferior, and apical walls, moderate AS, NATE 1.2cm2, moderate TR, mild MR, and mild LVH. His pacemaker was upgraded to a CRT-P on 08/03/16. He had bleeding into the pocket and had pocket revision on 09/28/16. He was evaluated by Antonio Bustillo MD (general surgeon) at [facility] to address wound healing over the device. In 2017, in the office I examined the device wound and noted the wound edges not to be opposed and the device to be partially exposed. He went on to have the device extracted and replaced (11/2016) (Osito Alicea MD). He had an MPI stress test on 10/04/17 which revealed an EF of 47%, normal perfusion. He had an echocardiogram on 10/05/17 which revealed an EF of 38%, grade I diastolic dysfunction, reduced RV function, akinesis apical septum and basal/mid inferolateral walls, severe AS, NATE 0.8cm2, LVOT/AV 0.23, and mean gradient 23mmHg. He underwent successful TAVR 26mm Jonas on04/02/19  (John Mccormick MD).  Since the procedure he has had urinary retention (which he has had in the past).  He will have another voiding trial on Monday.  He notes his ability to climb a local hill has improved since the TAVR.

## 2019-04-10 ENCOUNTER — APPOINTMENT (OUTPATIENT)
Dept: CARDIOTHORACIC SURGERY | Facility: CLINIC | Age: 84
End: 2019-04-10
Payer: MEDICARE

## 2019-04-10 VITALS
OXYGEN SATURATION: 96 % | DIASTOLIC BLOOD PRESSURE: 82 MMHG | SYSTOLIC BLOOD PRESSURE: 151 MMHG | RESPIRATION RATE: 14 BRPM | HEART RATE: 70 BPM

## 2019-04-10 PROCEDURE — 99213 OFFICE O/P EST LOW 20 MIN: CPT

## 2019-04-12 ENCOUNTER — OTHER (OUTPATIENT)
Age: 84
End: 2019-04-12

## 2019-04-12 DIAGNOSIS — I50.22 CHRONIC SYSTOLIC (CONGESTIVE) HEART FAILURE: ICD-10-CM

## 2019-04-12 DIAGNOSIS — Z95.0 PRESENCE OF CARDIAC PACEMAKER: ICD-10-CM

## 2019-04-12 DIAGNOSIS — N40.1 BENIGN PROSTATIC HYPERPLASIA WITH LOWER URINARY TRACT SYMPTOMS: ICD-10-CM

## 2019-04-12 DIAGNOSIS — E78.5 HYPERLIPIDEMIA, UNSPECIFIED: ICD-10-CM

## 2019-04-12 DIAGNOSIS — I25.10 ATHEROSCLEROTIC HEART DISEASE OF NATIVE CORONARY ARTERY WITHOUT ANGINA PECTORIS: ICD-10-CM

## 2019-04-12 DIAGNOSIS — K21.9 GASTRO-ESOPHAGEAL REFLUX DISEASE WITHOUT ESOPHAGITIS: ICD-10-CM

## 2019-04-12 DIAGNOSIS — Z95.1 PRESENCE OF AORTOCORONARY BYPASS GRAFT: ICD-10-CM

## 2019-04-12 DIAGNOSIS — I35.0 NONRHEUMATIC AORTIC (VALVE) STENOSIS: ICD-10-CM

## 2019-04-12 DIAGNOSIS — R33.8 OTHER RETENTION OF URINE: ICD-10-CM

## 2019-04-12 DIAGNOSIS — I11.0 HYPERTENSIVE HEART DISEASE WITH HEART FAILURE: ICD-10-CM

## 2019-04-12 DIAGNOSIS — Z00.6 ENCOUNTER FOR EXAMINATION FOR NORMAL COMPARISON AND CONTROL IN CLINICAL RESEARCH PROGRAM: ICD-10-CM

## 2019-04-12 DIAGNOSIS — I73.9 PERIPHERAL VASCULAR DISEASE, UNSPECIFIED: ICD-10-CM

## 2019-04-12 DIAGNOSIS — I71.4 ABDOMINAL AORTIC ANEURYSM, WITHOUT RUPTURE: ICD-10-CM

## 2019-04-19 PROCEDURE — C1889: CPT

## 2019-04-19 PROCEDURE — 80048 BASIC METABOLIC PNL TOTAL CA: CPT

## 2019-04-19 PROCEDURE — 71045 X-RAY EXAM CHEST 1 VIEW: CPT

## 2019-04-19 PROCEDURE — P9045: CPT

## 2019-04-19 PROCEDURE — 86923 COMPATIBILITY TEST ELECTRIC: CPT

## 2019-04-19 PROCEDURE — 85610 PROTHROMBIN TIME: CPT

## 2019-04-19 PROCEDURE — C1894: CPT

## 2019-04-19 PROCEDURE — 82962 GLUCOSE BLOOD TEST: CPT

## 2019-04-19 PROCEDURE — 83880 ASSAY OF NATRIURETIC PEPTIDE: CPT

## 2019-04-19 PROCEDURE — C1760: CPT

## 2019-04-19 PROCEDURE — 85025 COMPLETE CBC W/AUTO DIFF WBC: CPT

## 2019-04-19 PROCEDURE — C1769: CPT

## 2019-04-19 PROCEDURE — 83735 ASSAY OF MAGNESIUM: CPT

## 2019-04-19 PROCEDURE — 85027 COMPLETE CBC AUTOMATED: CPT

## 2019-04-19 PROCEDURE — 84295 ASSAY OF SERUM SODIUM: CPT

## 2019-04-19 PROCEDURE — 80053 COMPREHEN METABOLIC PANEL: CPT

## 2019-04-19 PROCEDURE — 84132 ASSAY OF SERUM POTASSIUM: CPT

## 2019-04-19 PROCEDURE — 36415 COLL VENOUS BLD VENIPUNCTURE: CPT

## 2019-04-19 PROCEDURE — 84100 ASSAY OF PHOSPHORUS: CPT

## 2019-04-19 PROCEDURE — C1887: CPT

## 2019-04-19 PROCEDURE — 85730 THROMBOPLASTIN TIME PARTIAL: CPT

## 2019-04-19 PROCEDURE — 93005 ELECTROCARDIOGRAM TRACING: CPT

## 2019-04-19 PROCEDURE — 82803 BLOOD GASES ANY COMBINATION: CPT

## 2019-04-19 PROCEDURE — 82330 ASSAY OF CALCIUM: CPT

## 2019-04-19 PROCEDURE — 93306 TTE W/DOPPLER COMPLETE: CPT

## 2019-04-19 PROCEDURE — 97161 PT EVAL LOW COMPLEX 20 MIN: CPT

## 2019-04-19 PROCEDURE — 97116 GAIT TRAINING THERAPY: CPT

## 2019-04-29 ENCOUNTER — APPOINTMENT (OUTPATIENT)
Dept: CARDIOLOGY | Facility: CLINIC | Age: 84
End: 2019-04-29
Payer: MEDICARE

## 2019-04-29 ENCOUNTER — RECORD ABSTRACTING (OUTPATIENT)
Age: 84
End: 2019-04-29

## 2019-04-29 PROCEDURE — 93000 ELECTROCARDIOGRAM COMPLETE: CPT

## 2019-04-29 PROCEDURE — 93306 TTE W/DOPPLER COMPLETE: CPT

## 2019-04-30 ENCOUNTER — OTHER (OUTPATIENT)
Age: 84
End: 2019-04-30

## 2019-05-01 ENCOUNTER — APPOINTMENT (OUTPATIENT)
Dept: CARDIOTHORACIC SURGERY | Facility: CLINIC | Age: 84
End: 2019-05-01
Payer: MEDICARE

## 2019-05-01 VITALS
HEART RATE: 70 BPM | SYSTOLIC BLOOD PRESSURE: 181 MMHG | DIASTOLIC BLOOD PRESSURE: 86 MMHG | OXYGEN SATURATION: 98 % | RESPIRATION RATE: 14 BRPM | TEMPERATURE: 97.8 F

## 2019-05-01 PROCEDURE — 99213 OFFICE O/P EST LOW 20 MIN: CPT

## 2019-05-01 NOTE — PHYSICAL EXAM
[Sclera] : the sclera and conjunctiva were normal [Strabismus] : no strabismus was seen [Jugular Venous Distention Increased] : there was no jugular-venous distention [Neck Appearance] : the appearance of the neck was normal [Exaggerated Use Of Accessory Muscles For Inspiration] : no accessory muscle use [Respiration, Rhythm And Depth] : normal respiratory rhythm and effort [Auscultation Breath Sounds / Voice Sounds] : lungs were clear to auscultation bilaterally [Heart Sounds Gallop] : no gallops [Heart Rate And Rhythm] : heart rate was normal and rhythm regular [Heart Sounds] : normal S1 and S2 [Heart Sounds Pericardial Friction Rub] : no pericardial rub [Examination Of The Chest] : the chest was normal in appearance [Abdomen Soft] : soft [Abdomen Tenderness] : non-tender [No CVA Tenderness] : no ~M costovertebral angle tenderness [Nail Clubbing] : no clubbing  or cyanosis of the fingernails [] : no rash [Abnormal Walk] : normal gait [Skin Color & Pigmentation] : normal skin color and pigmentation [Cranial Nerves] : cranial nerves 2-12 were intact [Motor Exam] : the motor exam was normal [Sensation] : the sensory exam was normal to light touch and pinprick [Oriented To Time, Place, And Person] : oriented to person, place, and time [Mood] : the mood was normal [Affect] : the affect was normal [Impaired Insight] : insight and judgment were intact [FreeTextEntry1] : n

## 2019-05-01 NOTE — HISTORY OF PRESENT ILLNESS
[Dyslipidemia] : Dyslipidemia [CVD Carotid Stenosis] : CVD Carotid Stenosis   [Right] : Right [Class II] : Class II [Heart Failure within 2 Weeks] : Heart Failure in last 2 weeks [No angina, No symptoms] : No symptoms of [FreeTextEntry1] : Mr. Mays, is a very pleasant 88 year old gentlemen, s/p TAVR for known sev aortic stenosis and a PHMx of CAD s/p CABG X 5V (04/06/15), chronic systolic HF (EF 44%), s/p PPM (CRT-P (08/03/16) with device extracted and replaced on 11/2016 2/2 device 2/2 partially exposure, NYHA II, YORDY s/p left CEA (12/14/04), PVD, abdominal aortic aneurysm (3.6 cm X 3.2 cm monitored by Vasc -  Dr. Swain), BPH, HTN and dyslipidemia.  \par \par  Pt's Cardio: Dr. Martins.\par \par Pt presents today accompanied by his spouse and daughter for his s/p TAVR 30 day f/u.  Pt reports of feeling "great".  He is very pleased in the way he feels.  Pt has no complaints, except for HTN -/90 - 122/56 with HR 82-69 (pt with PPM).  Pt presented BP recordings form yesterday. Pt denies headaches, N/V/ pain, palpitations, dizziness and fevers.  Pt states, not on his prior meds after he was discharged home. Pt was on Lisinopril 40 mg and labetalol 300 mg Q 12 hrs and instructed on d/c to take labetalol 200 mg Q 12 hrs and to stop his lisinopril. [Diabetes Mellitus] : no Diabetes Melllitus [Home Oxygen] : no home oxygen use [Liver Disease] : no liver disease [Unresponsive Neurologic State] : not in a unresponsive neurologic state [Cerebrovascular Disease] : no cerebrovascular disease [Prior Myocardial Infarction] : No prior myocardial infarction

## 2019-05-01 NOTE — REASON FOR VISIT
[Follow-Up: _____] : a [unfilled] follow-up visit [Spouse] : spouse [Family Member] : family member [FreeTextEntry1] : s/p TAVR with 26 Jonas 3 via TF  [de-identified] : s/p TAVR [de-identified] : Patient is status post TAVR  with 26 Jonas 3  via TF approach. \par  [de-identified] : 04/02/2019

## 2019-05-01 NOTE — ASSESSMENT
[FreeTextEntry1] : Mr. Mays, 88y, M, s/p TAVR on 4/2/2019 with 26 mm Jonas 3 via TF for his severe aortic stenosis with a PHMx  of CAD s/p CABG X 5V (04/06/15), chronic systolic HF (EF 44%), s/p PPM (CRT-P (08/03/16) with device extracted and replaced on 11/2016 2/2 device 2/2 partially exposure, NYHA I, YORDY s/p left CEA (12/14/04), PVD, abdominal aortic aneurysm (3.6 cm X 3.2 cm monitored by Vasc -  Dr. Swain), BPH, HTN and dyslipidemia.  \par \par Mr. Mays is doing well, no complaints, hypertensive will start pt on his prior dose of Labetalol 300 mg Q 12 hrs, and lisinopril 20 mg ( pt was on 40 mg prior) Q 24hr.   Dr. Mccormick will discuss plan with Dr. Mckay. Dr. Mccormick reviewed echo images, official report pending.  Pt is clinically stable and all question answered by Dr. Mccormick\par \par RTO for  1 year visit (4/2020)  s/p TAVR schedule TTE, EKG and labs prior to appt.\par f/u with Dr. Martins.\par Increase labetalol to 300 mg Q 12 hrs\par Re-started Lisinopril 20 mg Q 24 hrs.\par Pt instructed  to call office for any questions and/or concerns.\par Pt instructed to stop his Plavix after 7/2/19  at the 3 month calvin\par Pt cleared to drive\par \par

## 2019-05-01 NOTE — DATA REVIEWED
[FreeTextEntry1] : TTE completerd - official report pending 4/29/19\par \par EKG 4/29/19 scanned\par 70 bpm -paced \par Qt/QTRc 448/469\par \par Labs 4/17/19  scanned\par WBC 8.9\par H/H 10.5/30.9\par platelet 189\par Cr 1.2\par BUN 19\par \par KCCQ-12 scanned\par total = 59

## 2019-05-03 ENCOUNTER — OTHER (OUTPATIENT)
Age: 84
End: 2019-05-03

## 2019-05-17 ENCOUNTER — RECORD ABSTRACTING (OUTPATIENT)
Age: 84
End: 2019-05-17

## 2019-05-17 ENCOUNTER — RESULT CHARGE (OUTPATIENT)
Age: 84
End: 2019-05-17

## 2019-07-02 ENCOUNTER — RX RENEWAL (OUTPATIENT)
Age: 84
End: 2019-07-02

## 2019-07-11 ENCOUNTER — APPOINTMENT (OUTPATIENT)
Dept: HEART AND VASCULAR | Facility: CLINIC | Age: 84
End: 2019-07-11
Payer: MEDICARE

## 2019-07-11 VITALS
TEMPERATURE: 97.5 F | WEIGHT: 168.38 LBS | SYSTOLIC BLOOD PRESSURE: 118 MMHG | DIASTOLIC BLOOD PRESSURE: 70 MMHG | HEART RATE: 70 BPM | OXYGEN SATURATION: 97 % | BODY MASS INDEX: 25.52 KG/M2 | HEIGHT: 68 IN

## 2019-07-11 DIAGNOSIS — Z00.00 ENCOUNTER FOR GENERAL ADULT MEDICAL EXAMINATION W/OUT ABNORMAL FINDINGS: ICD-10-CM

## 2019-07-11 PROCEDURE — 99215 OFFICE O/P EST HI 40 MIN: CPT

## 2019-07-11 NOTE — REVIEW OF SYSTEMS
[Feeling Fatigued] : feeling fatigued [Dyspnea on exertion] : dyspnea during exertion [Urinary Frequency] : urinary frequency [Lower Ext Edema] : lower extremity edema [Joint Pain] : joint pain [Negative] : Endocrine [FreeTextEntry1] : + urinary retention

## 2019-07-11 NOTE — ASSESSMENT
[FreeTextEntry1] : 1. CAD: s/p CABG x 5 (04/06/15) (Malachi Jose MD), s/p cath 03/26/19: RA mid 50%, distal 80%, RDPA competitive flow from graft, LM 40%, LAD prox 100%, Lcx prox 50%, OM1 fills via graft, OM2 60%, LIMA-LAD patent, radial-LPL patent, SVG-diagonal patent, SVG-OM patent, SVG-RPDA patent, AV MG 41mmHg, CCS 0\par             - continue aspirin 81mg po daily\par             - continue Nitroglycerin Tablet Sublingual, 0.4 MG, Sublingual, Once a day as needed\par             - will pursue aggressive medical management\par             - check lab work today\par  \par 2. Chronic systolic heart failure: EF normalized: s/p echo: 04/29/19: EF 55%, LVH, PPM, s/p 26mm TAVR (PV 2.1m/s, MG 11.8mmHg, LVOT/AV 0.33, NATE 2.4cm2). \par             - continue prn loop diuretics, lasix 80mg po daily prn \par             - discussed with patient importance of maintaining a weight log, avoidance of dietary sodium, as well as appropriate use of loop diuretics\par             - will follow with serial echocardiograms (patient will provide report from outside lab)\par \par 3. Carotid stenosis: s/p left CEA 12/14/04 (Kareem Swain MD), 11/15/17: sono: EMI 40-59%, left CEA patent\par             - will monitor with periodic carotid sonograms (performed at the vascular surgeon's office)\par             - continue statin and anti-platelet agent\par             - discussed with patient surveillance for neurologic symptoms. \par \par 4. Aortic stenosis: s/p successful 26mm Jonas III TAVR 04/09/19 (John Mccormick MD)\par             - will follow with serial echocardiograms \par  \par 5. Aortic aneurysm, abdominal: s/p sono: mid 3.6cm x 3.2cm with b/l SLADE aneurysms, RCIA 2.5cm x 2.3cm, LCIA 1.9cm x 1.7cm (11/15/17)\par             - follow up with vascular surgeon, Kareem Swain MD. \par             - continue HTN management\par \par 6. HTN: BP at ACC/AHA 2017 guideline target, ? occasional low reading on ambulatory log, ? symptomatic\par             - will hold lisinopril and labetalol x 1 week to see if symptoms improve\par \par 7. BPH (benign prostatic hyperplasia): urinary retention post TAVR\par             - continue Finasteride Tablet, 5 MG, 1 tablet, Orally, Once a day\par             - continue Tamsulosin HCl Capsule, 0.4 MG, 1 capsule 30 minutes after the same meal each day, Orally, Once a day\par             - f/u with urologist \par \par 8. Presence of cardiac pacemaker: s/p CRT-P, upgraded from PPM 09/2016 complicated by hematoma requiring device extraction and replacement on the right side (11/2016).\par             - continue follow up with device clinic\par \par 9. Dyslipidemia:\par             - continue atorvastatin 80mg po daily

## 2019-07-11 NOTE — PHYSICAL EXAM
[Normal Conjunctiva] : the conjunctiva exhibited no abnormalities [Normal Oral Mucosa] : normal oral mucosa [Eyelids - No Xanthelasma] : the eyelids demonstrated no xanthelasmas [No Oral Pallor] : no oral pallor [No Oral Cyanosis] : no oral cyanosis [Heart Rate And Rhythm] : heart rate and rhythm were normal [Heart Sounds] : normal S1 and S2 [Abdomen Tenderness] : non-tender [Abdomen Soft] : soft [Abdomen Mass (___ Cm)] : no abdominal mass palpated [Gait - Sufficient For Exercise Testing] : the gait was sufficient for exercise testing [Abnormal Walk] : normal gait [Cyanosis, Localized] : no localized cyanosis [Nail Clubbing] : no clubbing of the fingernails [Petechial Hemorrhages (___cm)] : no petechial hemorrhages [Skin Color & Pigmentation] : normal skin color and pigmentation [No Venous Stasis] : no venous stasis [Skin Lesions] : no skin lesions [No Skin Ulcers] : no skin ulcer [No Xanthoma] : no  xanthoma was observed [Oriented To Time, Place, And Person] : oriented to person, place, and time [Mood] : the mood was normal [Affect] : the affect was normal [No Anxiety] : not feeling anxious [Respiration, Rhythm And Depth] : normal respiratory rhythm and effort [] : no respiratory distress [Auscultation Breath Sounds / Voice Sounds] : lungs were clear to auscultation bilaterally [Exaggerated Use Of Accessory Muscles For Inspiration] : no accessory muscle use [General Appearance - Well Developed] : well developed [Normal Appearance] : normal appearance [Well Groomed] : well groomed [General Appearance - Well Nourished] : well nourished [No Deformities] : no deformities [General Appearance - In No Acute Distress] : no acute distress [FreeTextEntry1] : + multiple ecchymosis

## 2019-07-11 NOTE — REASON FOR VISIT
[Follow-Up - Clinic] : a clinic follow-up of [FreeTextEntry1] : Diagnostic Tests:\par -----------------------------------\par ECG:\par 04/29/19: A-sensed, V-paced at 70 bpm.\par 02/07/19: A-sensed, V-paced at 70 bpm. \par 01/09/18: A-sensed, V-paced at 70 bpm. \par 05/12/16: A-sensed, V-paced at 62 BPM. \par 03/24/15: A-paced, V-paced at 64bpm\par 08/28/13: A-paced, V-sensed, RBBB, LAFB, prolonged A paced to V-sensed delay (288ms)\par -----------------------------------\par CTCA:\par 03/19/19: moderately calcified aortic valve, severe TVD, LIMA-LAD patent, SVG-D1 patent, RA-RPL patent, SVG-OM patent, SCG-RPDA patent, \par 02/24/15: Ca+ score 1027.41 (75th percentile), LM distal 50%, LAD prox 75%, LCX moderate, RCA moderate, PDA 50%, EF 56%, no RWMA\par ----------------------------\par Stress:\par 10/04/17: regadenoson MPI: EF 47%, normal perfusion.\par 03/03/15: exercise MPI: 7 METS, EF 54%, fixed inferior and inferoseptal defects\par 02/29/12: exercise MPI: 7 METS, mild inferior and inferoseptal scar, no ischemia, EF 54%, no RWMA\par 03/02/09: exercise MPI: 7 METS, mild inferior and inferoseptal scar, no ischemia, EF 54%, no RWMA\par ----------------------------\par Echo:\par 04/29/19: EF 55%, LVH, dilated LA< s/p TAVR normally functioning, mild TR. \par 04/03/19: EF 54%, LVH, akinesis basal and mid IL, PPM, s/p 26mm TAVR (PV 2.1m/s, MG 11.8mmHg, LVOT/AV 0.33, NATE 2.4cm2). \par 02/12/19: E 44%, akinesis basal/mid IL, LVH, dilated LA/RA, normal RV function, severe AS (NATE 0.75cm2, PV 3.6m/s, MG 31mmHg, LVOT/AV 0.25, SVI 22ml/m2), PPM, atrial septal aneurysm.\par 10/05/17: EF 38%, grade I diastolic dysfunction, reduced RV function, akinesis apical septum and basal/mid inferolateral walls, severe AS, NATE 0.8cm2, LVOT/AV 0.23, mean gradient 23mmHg. \par 05/12/16: EF 36%, akinesis apical anterior, apical inferior and apical walls, moderate AS, NATE 1.2cm2, moderate TR, mild MR, LVH\par 04/02/15: EF 41%, mid LAD MI, mild AS, NATE 1.7cm2, grade I diastolic dysfunction, dilated LA, mild TR, PASP 36mmHg\par 02/28/12: EF 55%, mild TR, diastolic dysfunction\par 02/14/09: EF 55%, aortic sclerosis, diastolic dysfunction\par -----------------------------\par Tilt table test:\par 08/14/13: + orthostatic induced vasovagal symptoms\par ----------------------------\par Carotid:\par 03/19/19: sono: 20-39% b/l ICA.\par 11/15/17: sono: EMI 40-59%, s/p left CEA patent\par 04/03/15: sono: s/p left CEA, LICA 20-49%, EMI 50-69%\par ----------------------------\par Cath:\par 04/02/19: successful 26mm Jonas III TAVR\par 03/26/19: RA mid 50%, distal 80%, RDPA competitive flow from graft, LM 40%, LAD prox 100%, Lcx prox 50%, OM1 fills via graft, OM2 60%, LIMA-LAD patent, radial-LPL patent, SVG-diagonal patent, SVG-OM patent, SVG-RPDA patent, AV MG 41mmHg. \par 03/16/15: EF 50%, LAD MI, LM distal 40%, LAD prox 95%, mid 80%, D3 50%, LCX prox 50%, distal 90%, LPL ostium 80%, RCA mid 40%, pDA 100% with collaterals from LAD\par ----------------------------\par Aorto-Iliac:\par 11/15/17: sono: AAA mid 3.6cm x 3.2cm, RCIA 2.5cm x 2.3cm. LCIA 1.9cm x 1.7cm.\par ----------------------------\par Upper extremity veins:\par 11/17/16: sono: right: no RUE DVT.

## 2019-07-11 NOTE — HISTORY OF PRESENT ILLNESS
[FreeTextEntry1] : Mr. 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 He underwent 5-vessel coronary artery bypass grafting on 04/06/15. He had an echocardiogram on 05/12/16 which revealed an EF of 36%, akinesis of the apical anterior, apical inferior, and apical walls, moderate AS, NATE 1.2cm2, moderate TR, mild MR, and mild LVH. His pacemaker was upgraded to a CRT-P on 08/03/16. He had bleeding into the pocket and had pocket revision on 09/28/16. He was evaluated by Antonio Bustillo MD (general surgeon) at [facility] to address wound healing over the device. In 2017, in the office I examined the device wound and noted the wound edges not to be opposed and the device to be partially exposed. He went on to have the device extracted and replaced (11/2016) (Osito Alicea MD). He had an MPI stress test on 10/04/17 which revealed an EF of 47%, normal perfusion.  He underwent successful TAVR 26mm Jonas on 04/02/19  (John Mccormick MD).  The procedure was complicated by urinary retention which has since resolved.  His ambulatory BP log reveals SBP in the range of .  He has complaints of generalized weakness.  He denies chest pain or palpitations.  He is following with device clinic.

## 2019-07-12 LAB
ALBUMIN SERPL ELPH-MCNC: 4.3 G/DL
ALP BLD-CCNC: 62 U/L
ALT SERPL-CCNC: 30 U/L
ANION GAP SERPL CALC-SCNC: 13 MMOL/L
AST SERPL-CCNC: 36 U/L
BASOPHILS # BLD AUTO: 0.06 K/UL
BASOPHILS NFR BLD AUTO: 0.9 %
BILIRUB SERPL-MCNC: 1.3 MG/DL
BUN SERPL-MCNC: 23 MG/DL
CALCIUM SERPL-MCNC: 9.8 MG/DL
CHLORIDE SERPL-SCNC: 108 MMOL/L
CHOLEST SERPL-MCNC: 121 MG/DL
CHOLEST/HDLC SERPL: 2.2 RATIO
CO2 SERPL-SCNC: 24 MMOL/L
CREAT SERPL-MCNC: 1.35 MG/DL
EOSINOPHIL # BLD AUTO: 0.15 K/UL
EOSINOPHIL NFR BLD AUTO: 2.2 %
ESTIMATED AVERAGE GLUCOSE: 103 MG/DL
GLUCOSE SERPL-MCNC: 96 MG/DL
HBA1C MFR BLD HPLC: 5.2 %
HCT VFR BLD CALC: 39.1 %
HDLC SERPL-MCNC: 55 MG/DL
HGB BLD-MCNC: 12.1 G/DL
IMM GRANULOCYTES NFR BLD AUTO: 0.1 %
LDLC SERPL CALC-MCNC: 54 MG/DL
LDLC SERPL DIRECT ASSAY-MCNC: 66 MG/DL
LYMPHOCYTES # BLD AUTO: 1.59 K/UL
LYMPHOCYTES NFR BLD AUTO: 23.7 %
MAN DIFF?: NORMAL
MCHC RBC-ENTMCNC: 30.9 GM/DL
MCHC RBC-ENTMCNC: 32 PG
MCV RBC AUTO: 103.4 FL
MONOCYTES # BLD AUTO: 0.6 K/UL
MONOCYTES NFR BLD AUTO: 8.9 %
NEUTROPHILS # BLD AUTO: 4.31 K/UL
NEUTROPHILS NFR BLD AUTO: 64.2 %
PLATELET # BLD AUTO: 156 K/UL
POTASSIUM SERPL-SCNC: 4.8 MMOL/L
PROT SERPL-MCNC: 7.1 G/DL
RBC # BLD: 3.78 M/UL
RBC # FLD: 14 %
SODIUM SERPL-SCNC: 145 MMOL/L
TRIGL SERPL-MCNC: 60 MG/DL
TSH SERPL-ACNC: 3.31 UIU/ML
WBC # FLD AUTO: 6.72 K/UL

## 2019-08-02 ENCOUNTER — RESULT CHARGE (OUTPATIENT)
Age: 84
End: 2019-08-02

## 2019-09-24 ENCOUNTER — APPOINTMENT (OUTPATIENT)
Dept: HEART AND VASCULAR | Facility: CLINIC | Age: 84
End: 2019-09-24
Payer: MEDICARE

## 2019-09-24 VITALS
SYSTOLIC BLOOD PRESSURE: 162 MMHG | DIASTOLIC BLOOD PRESSURE: 80 MMHG | HEIGHT: 68 IN | OXYGEN SATURATION: 98 % | HEART RATE: 70 BPM | TEMPERATURE: 98.4 F | WEIGHT: 172 LBS | BODY MASS INDEX: 26.07 KG/M2

## 2019-09-24 VITALS — DIASTOLIC BLOOD PRESSURE: 81 MMHG | SYSTOLIC BLOOD PRESSURE: 176 MMHG

## 2019-09-24 PROCEDURE — 99214 OFFICE O/P EST MOD 30 MIN: CPT

## 2019-09-24 NOTE — REASON FOR VISIT
[FreeTextEntry1] : Diagnostic Tests:\par -----------------------------------\par ECG:\par 04/29/19: A-sensed, V-paced at 70 bpm.\par 02/07/19: A-sensed, V-paced at 70 bpm. \par 01/09/18: A-sensed, V-paced at 70 bpm. \par 05/12/16: A-sensed, V-paced at 62 BPM. \par 03/24/15: A-paced, V-paced at 64bpm\par 08/28/13: A-paced, V-sensed, RBBB, LAFB, prolonged A paced to V-sensed delay (288ms)\par -----------------------------------\par CTCA:\par 03/19/19: moderately calcified aortic valve, severe TVD, LIMA-LAD patent, SVG-D1 patent, RA-RPL patent, SVG-OM patent, SCG-RPDA patent, \par 02/24/15: Ca+ score 1027.41 (75th percentile), LM distal 50%, LAD prox 75%, LCX moderate, RCA moderate, PDA 50%, EF 56%, no RWMA\par ----------------------------\par Stress:\par 10/04/17: regadenoson MPI: EF 47%, normal perfusion.\par 03/03/15: exercise MPI: 7 METS, EF 54%, fixed inferior and inferoseptal defects\par 02/29/12: exercise MPI: 7 METS, mild inferior and inferoseptal scar, no ischemia, EF 54%, no RWMA\par 03/02/09: exercise MPI: 7 METS, mild inferior and inferoseptal scar, no ischemia, EF 54%, no RWMA\par ----------------------------\par Echo:\par 04/29/19: EF 55%, LVH, dilated LA, s/p TAVR normally functioning, mild TR. \par 04/03/19: EF 54%, LVH, akinesis basal and mid IL, PPM, s/p 26mm TAVR (PV 2.1m/s, MG 11.8mmHg, LVOT/AV 0.33, NATE 2.4cm2). \par 02/12/19: E 44%, akinesis basal/mid IL, LVH, dilated LA/RA, normal RV function, severe AS (NATE 0.75cm2, PV 3.6m/s, MG 31mmHg, LVOT/AV 0.25, SVI 22ml/m2), PPM, atrial septal aneurysm.\par 10/05/17: EF 38%, grade I diastolic dysfunction, reduced RV function, akinesis apical septum and basal/mid inferolateral walls, severe AS, NATE 0.8cm2, LVOT/AV 0.23, mean gradient 23mmHg. \par 05/12/16: EF 36%, akinesis apical anterior, apical inferior and apical walls, moderate AS, NATE 1.2cm2, moderate TR, mild MR, LVH\par 04/02/15: EF 41%, mid LAD MI, mild AS, NATE 1.7cm2, grade I diastolic dysfunction, dilated LA, mild TR, PASP 36mmHg\par 02/28/12: EF 55%, mild TR, diastolic dysfunction\par 02/14/09: EF 55%, aortic sclerosis, diastolic dysfunction\par -----------------------------\par Tilt table test:\par 08/14/13: + orthostatic induced vasovagal symptoms\par ----------------------------\par Carotid:\par 03/19/19: sono: 20-39% b/l ICA.\par 11/15/17: sono: EMI 40-59%, s/p left CEA patent\par 04/03/15: sono: s/p left CEA, LICA 20-49%, EMI 50-69%\par ----------------------------\par Cath:\par 04/02/19: successful 26mm Jonas III TAVR\par 03/26/19: RA mid 50%, distal 80%, RDPA competitive flow from graft, LM 40%, LAD prox 100%, Lcx prox 50%, OM1 fills via graft, OM2 60%, LIMA-LAD patent, radial-LPL patent, SVG-diagonal patent, SVG-OM patent, SVG-RPDA patent, AV MG 41mmHg. \par 03/16/15: EF 50%, LAD MI, LM distal 40%, LAD prox 95%, mid 80%, D3 50%, LCX prox 50%, distal 90%, LPL ostium 80%, RCA mid 40%, pDA 100% with collaterals from LAD\par ----------------------------\par Aorto-Iliac:\par 11/15/17: sono: AAA mid 3.6cm x 3.2cm, RCIA 2.5cm x 2.3cm. LCIA 1.9cm x 1.7cm.\par ----------------------------\par Upper extremity veins:\par 11/17/16: sono: right: no RUE DVT.

## 2019-09-24 NOTE — PHYSICAL EXAM
[Normal Jugular Venous A Waves Present] : normal jugular venous A waves present [Normal Jugular Venous V Waves Present] : normal jugular venous V waves present [No Jugular Venous Bella A Waves] : no jugular venous belal A waves [Bowel Sounds] : normal bowel sounds [FreeTextEntry1] : + multiple ecchymosis

## 2019-09-24 NOTE — HISTORY OF PRESENT ILLNESS
[FreeTextEntry1] : Mr. 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 He underwent 5-vessel coronary artery bypass grafting on 04/06/15. He had an echocardiogram on 05/12/16 which revealed an EF of 36%, akinesis of the apical anterior, apical inferior, and apical walls, moderate AS, NATE 1.2cm2, moderate TR, mild MR, and mild LVH. His pacemaker was upgraded to a CRT-P on 08/03/16. He had bleeding into the pocket and had pocket revision on 09/28/16. He was evaluated by Antonio Bustillo MD (general surgeon) at [facility] to address wound healing over the device. In 2017, in the office I examined the device wound and noted the wound edges not to be opposed and the device to be partially exposed. He went on to have the device extracted and replaced (11/2016) (Osito Alicea MD). He had an MPI stress test on 10/04/17 which revealed an EF of 47%, normal perfusion.  He underwent successful TAVR 26mm Jonas on 04/02/19  (John Mccormick MD).  The procedure was complicated by urinary retention which has since resolved.  His ambulatory BP log reveals SBP in the range of .   He denies chest pain or palpitations.  He is following with device clinic.  \par \par

## 2019-09-24 NOTE — ASSESSMENT
[FreeTextEntry1] : 1. CAD: s/p CABG x 5 (04/06/15) (Malachi Jose MD), s/p cath 03/26/19: RA mid 50%, distal 80%, RDPA competitive flow from graft, LM 40%, LAD prox 100%, Lcx prox 50%, OM1 fills via graft, OM2 60%, LIMA-LAD patent, radial-LPL patent, SVG-diagonal patent, SVG-OM patent, SVG-RPDA patent, AV MG 41mmHg, CCS 0\par             - continue aspirin 81mg po daily\par             - continue Nitroglycerin Tablet Sublingual, 0.4 MG, Sublingual, Once a day as needed\par             - will pursue aggressive medical management\par \par 2. Chronic systolic heart failure: EF normalized: s/p echo: 04/29/19: EF 55%, LVH, PPM, s/p 26mm TAVR (PV 2.1m/s, MG 11.8mmHg, LVOT/AV 0.33, NATE 2.4cm2). \par             - continue prn loop diuretics, lasix 80mg po daily prn \par             - discussed with patient importance of maintaining a weight log, avoidance of dietary sodium, as well as appropriate use of loop diuretics\par             - will follow with serial echocardiograms \par \par 3. Carotid stenosis: s/p left CEA 12/14/04 (Kareem Swain MD), 11/15/17: sono: EMI 40-59%, left CEA patent\par             - will monitor with periodic carotid sonograms (performed at the vascular surgeon's office)\par             - continue statin and anti-platelet agent\par             - discussed with patient surveillance for neurologic symptoms. \par \par 4. Aortic stenosis: s/p successful 26mm Jonas III TAVR 04/09/19 (John Mccormick MD)\par             - will follow with serial echocardiograms \par  \par 5. Aortic aneurysm, abdominal: s/p sono: mid 3.6cm x 3.2cm with b/l SLADE aneurysms, RCIA 2.5cm x 2.3cm, LCIA 1.9cm x 1.7cm (11/15/17)\par             - follow up with vascular surgeon, Kareem Swain MD. \par             - continue HTN management\par \par 6. HTN: BP at ACC/AHA 2017 guideline target, ? occasional low reading on ambulatory log, ? symptomatic\par             - continue labetalol 200mg po bid (may hold evening dose if BP less than 130 or take extra 0.5 tablet in am  if SBP >140 post am dose)\par \par 7. BPH (benign prostatic hyperplasia): urinary retention post TAVR\par             - continue Finasteride Tablet, 5 MG, 1 tablet, Orally, Once a day\par             - continue Tamsulosin HCl Capsule, 0.4 MG, 1 capsule 30 minutes after the same meal each day, Orally, Once a day\par             - f/u with urologist \par \par 8. Presence of cardiac pacemaker: s/p CRT-P, upgraded from PPM 09/2016 complicated by hematoma requiring device extraction and replacement on the right side (11/2016).\par             - continue follow up with device clinic\par \par 9. Dyslipidemia:\par             - continue atorvastatin 80mg po daily

## 2019-10-22 NOTE — PHYSICAL THERAPY INITIAL EVALUATION ADULT - LEVEL OF INDEPENDENCE: STAIR NEGOTIATION, REHAB EVAL
contact guard Helical Rim Advancement Flap Text: The defect edges were debeveled with a #15 blade scalpel.  Given the location of the defect and the proximity to free margins (helical rim) a double helical rim advancement flap was deemed most appropriate.  Using a sterile surgical marker, the appropriate advancement flaps were drawn incorporating the defect and placing the expected incisions between the helical rim and antihelix where possible.  The area thus outlined was incised through and through with a #15 scalpel blade.  With a skin hook and iris scissors, the flaps were gently and sharply undermined and freed up.

## 2019-11-24 NOTE — DISCHARGE NOTE PROVIDER - NSDCCPCAREPLAN_GEN_ALL_CORE_FT
Gastroenterology consultation note    Reason for consultation:  Recurrent severe episodic abdominal pain of uncertain etiology      History of present illness:   The patient is a 47year old female who was admitted to the hospital for vomiting, severe abdom patient is entirely asymptomatic. She denies skin rashes or hives. There are no changes in color of her urine during these episodes. Of note the patient takes lisinopril. She believes that her first episode occurred before she started the lisinopril. Use      Smoking status: Never Smoker      Smokeless tobacco: Never Used    Substance and Sexual Activity      Alcohol use: Not on file      Drug use: Not on file      Sexual activity: Not on file    Lifestyle      Physical activity:        Days per week: 51.2*   MCV 86.6   MCH 29.9   MCHC 34.6   RDW 12.6   NEPRELIM 12.16*   WBC 14.7*   .0       Recent Labs   Lab 11/23/19  1304   *   BUN 13   CREATSERUM 1.10*   GFRAA 66   GFRNAA 57*   CA 9.4   ALB 3.9      K 3.9      CO2 24.0   ALK 11/23/2019 at 15:09     Approved by (CST): Torres Jimenez MD on 11/23/2019 at 15:33            Assessment and Plan:    Impression:  Episodic severe abdominal pain, distention, vomiting  The patient presents with the above-mentioned symptoms that have occu PRINCIPAL DISCHARGE DIAGNOSIS  Diagnosis: HTN (hypertension)  Assessment and Plan of Treatment:

## 2019-12-12 ENCOUNTER — APPOINTMENT (OUTPATIENT)
Dept: HEART AND VASCULAR | Facility: CLINIC | Age: 84
End: 2019-12-12
Payer: MEDICARE

## 2019-12-12 VITALS
DIASTOLIC BLOOD PRESSURE: 71 MMHG | HEART RATE: 71 BPM | WEIGHT: 172.06 LBS | TEMPERATURE: 97.6 F | SYSTOLIC BLOOD PRESSURE: 153 MMHG | HEIGHT: 68 IN | BODY MASS INDEX: 26.08 KG/M2 | OXYGEN SATURATION: 95 %

## 2019-12-12 VITALS — SYSTOLIC BLOOD PRESSURE: 150 MMHG | DIASTOLIC BLOOD PRESSURE: 75 MMHG

## 2019-12-12 PROCEDURE — 99215 OFFICE O/P EST HI 40 MIN: CPT

## 2019-12-12 RX ORDER — LABETALOL HYDROCHLORIDE 300 MG/1
300 TABLET, FILM COATED ORAL
Refills: 0 | Status: ACTIVE | COMMUNITY

## 2019-12-12 NOTE — ASSESSMENT
[FreeTextEntry1] : 1. CAD: s/p CABG x 5 (04/06/15) (Malachi Jose MD), s/p cath 03/26/19: RA mid 50%, distal 80%, RDPA competitive flow from graft, LM 40%, LAD prox 100%, Lcx prox 50%, OM1 fills via graft, OM2 60%, LIMA-LAD patent, radial-LPL patent, SVG-diagonal patent, SVG-OM patent, SVG-RPDA patent, AV MG 41mmHg, s/p CTCA 03/19/19: moderately calcified aortic valve, severe TVD, LIMA-LAD patent, SVG-D1 patent, RA-RPL patent, SVG-OM patent, SVG-RPDA patent,\par             - continue aspirin 81mg po daily\par             - continue Nitroglycerin Tablet Sublingual, 0.4 MG, Sublingual, Once a day as needed\par             - will pursue aggressive medical management\par \par 2. Chronic systolic heart failure: EF normalized: s/p echo: 04/29/19: EF 55%, LVH, PPM, s/p 26mm TAVR (PV 2.1m/s, MG 11.8mmHg, LVOT/AV 0.33, NATE 2.4cm2). \par             - continue prn loop diuretics, lasix 80mg po daily prn \par             - discussed with patient importance of maintaining a weight log, avoidance of dietary sodium, as well as appropriate use of loop diuretics\par             - will follow with serial echocardiograms \par             - check lab work today\par \par 3. Carotid stenosis: s/p left CEA 12/14/04 (Kareem Swain MD), 11/14/19: sono: EMI 40-59%, left CEA patent\par             - will monitor with periodic carotid sonograms (performed at the vascular surgeon's office)\par             - continue statin and anti-platelet agent\par             - discussed with patient surveillance for neurologic symptoms. \par \par 4. Aortic stenosis: s/p successful 26mm Jonas III TAVR 04/09/19 (John Mccormick MD)\par             - will follow with serial echocardiograms \par  \par 5. Aortic aneurysm, abdominal: s/p sono 11/14/19\par             - follow up with vascular surgeon, Kareem Swain MD. \par             - continue HTN management\par \par 6. HTN: BP not at ACC/AHA 2017 guideline target, ? occasional low reading on ambulatory log, ? symptomatic\par             - continue labetalol 300mg po bid (may hold evening dose if BP less than 120 or take extra 0.5 tablet in am  if SBP >140 post am dose)\par             - will initiate valsartan 160mg po QD (possible adverse effects of new medications discussed)\par \par 7. BPH (benign prostatic hyperplasia): urinary retention post TAVR\par             - continue Finasteride Tablet, 5 MG, 1 tablet, Orally, Once a day\par             - continue Tamsulosin HCl Capsule, 0.4 MG, 1 capsule 30 minutes after the same meal each day, Orally, Once a day\par             - f/u with urologist \par \par 8. Presence of cardiac pacemaker: s/p CRT-P, upgraded from PPM 09/2016 complicated by hematoma requiring device extraction and replacement on the right side (11/2016).\par             - continue follow up with device clinic\par \par 9. Dyslipidemia:\par             - continue atorvastatin 80mg po daily

## 2019-12-12 NOTE — REASON FOR VISIT
[Follow-Up - Clinic] : a clinic follow-up of [FreeTextEntry1] : HTN, dyslipidemia, CAD s/p CABG x 5 (04/06/15), chronic systolic heart failure, aortic stenosis s/p TAVR 04/02/19, and YORDY s/p left CEA.

## 2019-12-12 NOTE — REVIEW OF SYSTEMS
[Feeling Fatigued] : feeling fatigued [Joint Pain] : joint pain [Joint Stiffness] : joint stiffness [Negative] : Heme/Lymph [Dyspnea on exertion] : dyspnea during exertion [Lower Ext Edema] : lower extremity edema [Urinary Frequency] : urinary frequency [FreeTextEntry1] : + urinary retention

## 2019-12-12 NOTE — HISTORY OF PRESENT ILLNESS
[FreeTextEntry1] : Mr. 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 He underwent 5-vessel coronary artery bypass grafting on 04/06/15. He had an echocardiogram on 05/12/16 which revealed an EF of 36%, akinesis of the apical anterior, apical inferior, and apical walls, moderate AS, NATE 1.2cm2, moderate TR, mild MR, and mild LVH. His pacemaker was upgraded to a CRT-P on 08/03/16. He had bleeding into the pocket and had pocket revision on 09/28/16. He was evaluated by Antonio Bustillo MD (general surgeon) at [facility] to address wound healing over the device. In 2017, in the office I examined the device wound and noted the wound edges not to be opposed and the device to be partially exposed. He went on to have the device extracted and replaced (11/2016) (Osito Alicea MD). He had an MPI stress test on 10/04/17 which revealed an EF of 47%, normal perfusion.  He underwent successful TAVR 26mm Jonas on 04/02/19  (John Mccormick MD).  The procedure was complicated by urinary retention which has since resolved.  On 03/19/19 he had a CTCA which revealed: moderately calcified aortic valve, severe TVD, LIMA-LAD patent, SVG-D1 patent, RA-RPL patent, SVG-OM patent, and SVG-RPDA patent.  He had an echocardiogram on 04/29/19 which revealed an EF of 55%, LVH, dilated LA, s/p TAVR normally functioning, and mild TR.   His home BP device correlates well with the in-office device.  His home BP Log reveals an average SBP in the 140s-150s.  At present, he has complaints of generalized fatigue but denies chest pain.  His lower extremity edema is mild. \par \par

## 2019-12-12 NOTE — PHYSICAL EXAM
[Normal Conjunctiva] : the conjunctiva exhibited no abnormalities [Eyelids - No Xanthelasma] : the eyelids demonstrated no xanthelasmas [Normal Oral Mucosa] : normal oral mucosa [No Oral Pallor] : no oral pallor [Normal Jugular Venous A Waves Present] : normal jugular venous A waves present [No Oral Cyanosis] : no oral cyanosis [Normal Jugular Venous V Waves Present] : normal jugular venous V waves present [No Jugular Venous Bella A Waves] : no jugular venous blela A waves [Heart Rate And Rhythm] : heart rate and rhythm were normal [Heart Sounds] : normal S1 and S2 [Murmurs] : no murmurs present [Abdomen Soft] : soft [Abdomen Tenderness] : non-tender [Abnormal Walk] : normal gait [Abdomen Mass (___ Cm)] : no abdominal mass palpated [Gait - Sufficient For Exercise Testing] : the gait was sufficient for exercise testing [Nail Clubbing] : no clubbing of the fingernails [Cyanosis, Localized] : no localized cyanosis [Petechial Hemorrhages (___cm)] : no petechial hemorrhages [Skin Color & Pigmentation] : normal skin color and pigmentation [No Venous Stasis] : no venous stasis [No Skin Ulcers] : no skin ulcer [Skin Lesions] : no skin lesions [No Xanthoma] : no  xanthoma was observed [Oriented To Time, Place, And Person] : oriented to person, place, and time [Affect] : the affect was normal [Mood] : the mood was normal [No Anxiety] : not feeling anxious [Bowel Sounds] : normal bowel sounds [General Appearance - Well Developed] : well developed [Normal Appearance] : normal appearance [Well Groomed] : well groomed [General Appearance - Well Nourished] : well nourished [No Deformities] : no deformities [General Appearance - In No Acute Distress] : no acute distress [] : no respiratory distress [Respiration, Rhythm And Depth] : normal respiratory rhythm and effort [Exaggerated Use Of Accessory Muscles For Inspiration] : no accessory muscle use [Auscultation Breath Sounds / Voice Sounds] : lungs were clear to auscultation bilaterally [FreeTextEntry1] : + median sternotomy, + device generator anterior right chest, 2/6 SAMIR late peaking RUSB without radiation. mild right ankle edema

## 2020-01-13 ENCOUNTER — OUTPATIENT (OUTPATIENT)
Dept: OUTPATIENT SERVICES | Facility: HOSPITAL | Age: 85
LOS: 1 days | Discharge: HOME | End: 2020-01-13
Payer: MEDICARE

## 2020-01-13 DIAGNOSIS — Z95.1 PRESENCE OF AORTOCORONARY BYPASS GRAFT: Chronic | ICD-10-CM

## 2020-01-13 DIAGNOSIS — Z98.890 OTHER SPECIFIED POSTPROCEDURAL STATES: Chronic | ICD-10-CM

## 2020-01-13 DIAGNOSIS — Z95.0 PRESENCE OF CARDIAC PACEMAKER: Chronic | ICD-10-CM

## 2020-01-13 PROCEDURE — 92012 INTRM OPH EXAM EST PATIENT: CPT

## 2020-01-18 DIAGNOSIS — H01.02B SQUAMOUS BLEPHARITIS LEFT EYE, UPPER AND LOWER EYELIDS: ICD-10-CM

## 2020-01-18 DIAGNOSIS — H25.813 COMBINED FORMS OF AGE-RELATED CATARACT, BILATERAL: ICD-10-CM

## 2020-01-18 DIAGNOSIS — H01.02A SQUAMOUS BLEPHARITIS RIGHT EYE, UPPER AND LOWER EYELIDS: ICD-10-CM

## 2020-02-13 ENCOUNTER — NON-APPOINTMENT (OUTPATIENT)
Age: 85
End: 2020-02-13

## 2020-02-13 ENCOUNTER — APPOINTMENT (OUTPATIENT)
Dept: HEART AND VASCULAR | Facility: CLINIC | Age: 85
End: 2020-02-13
Payer: MEDICARE

## 2020-02-13 VITALS
HEIGHT: 68 IN | TEMPERATURE: 97.6 F | OXYGEN SATURATION: 98 % | WEIGHT: 172.31 LBS | HEART RATE: 72 BPM | DIASTOLIC BLOOD PRESSURE: 72 MMHG | SYSTOLIC BLOOD PRESSURE: 153 MMHG | BODY MASS INDEX: 26.12 KG/M2

## 2020-02-13 VITALS — DIASTOLIC BLOOD PRESSURE: 79 MMHG | SYSTOLIC BLOOD PRESSURE: 157 MMHG

## 2020-02-13 DIAGNOSIS — I50.22 CHRONIC SYSTOLIC (CONGESTIVE) HEART FAILURE: ICD-10-CM

## 2020-02-13 PROCEDURE — 93000 ELECTROCARDIOGRAM COMPLETE: CPT

## 2020-02-13 PROCEDURE — 99215 OFFICE O/P EST HI 40 MIN: CPT | Mod: 25

## 2020-02-13 NOTE — HISTORY OF PRESENT ILLNESS
[FreeTextEntry1] : Mr. 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 He underwent 5-vessel coronary artery bypass grafting on 04/06/15. He had an echocardiogram on 05/12/16 which revealed an EF of 36%, akinesis of the apical anterior, apical inferior, and apical walls, moderate AS, NATE 1.2cm2, moderate TR, mild MR, and mild LVH. His pacemaker was upgraded to a CRT-P on 08/03/16. He had bleeding into the pocket and had pocket revision on 09/28/16. He was evaluated by Antonio Bustillo MD (general surgeon) at [facility] to address wound healing over the device. In 2017, in the office I examined the device wound and noted the wound edges not to be opposed and the device to be partially exposed. He went on to have the device extracted and replaced (11/2016) (Osito Alicea MD). He had an MPI stress test on 10/04/17 which revealed an EF of 47%, normal perfusion.  He underwent successful TAVR 26mm Jonas on 04/02/19  (John Mccormick MD).  The procedure was complicated by urinary retention which has since resolved.  On 03/19/19 he had a CTCA which revealed: moderately calcified aortic valve, severe TVD, LIMA-LAD patent, SVG-D1 patent, RA-RPL patent, SVG-OM patent, and SVG-RPDA patent.  He had an echocardiogram on 04/29/19 which revealed an EF of 55%, LVH, dilated LA, s/p TAVR normally functioning, and mild TR.   His home BP device correlates well with the in-office device.  His home BP Log reveals an average SBP in the 140s-150s.  At present, he has complaints of generalized fatigue but denies chest pain.  His lower extremity edema is mild. \par \par Since his last visit he still has feelings of generalized fatigue but is more concerned about his elevated bp.\par He takes his bp at home and noticed lower readings at night prior to taking 2nd dose. Denies cp, sob, and palpitations. He had one isolated incidence of dizziness during [place] when he stood up from a sitting position for approx. 1 minute. Pt did not eat or drink much that day.

## 2020-02-13 NOTE — REASON FOR VISIT
[FreeTextEntry1] : Diagnostic Tests:\par -----------------------------------\par ECG:\par 02/13/20: A-paced, V-paced. \par 04/29/19: A-sensed, V-paced at 70 bpm.\par 02/07/19: A-sensed, V-paced at 70 bpm. \par 01/09/18: A-sensed, V-paced at 70 bpm. \par 05/12/16: A-sensed, V-paced at 62 BPM. \par 03/24/15: A-paced, V-paced at 64bpm\par 08/28/13: A-paced, V-sensed, RBBB, LAFB, prolonged A paced to V-sensed delay (288ms)\par -----------------------------------\par CTCA:\par 03/19/19: moderately calcified aortic valve, severe TVD, LIMA-LAD patent, SVG-D1 patent, RA-RPL patent, SVG-OM patent, SVG-RPDA patent. \par 02/24/15: Ca+ score 1027.41 (75th percentile), LM distal 50%, LAD prox 75%, LCX moderate, RCA moderate, PDA 50%, EF 56%, no RWMA\par ----------------------------\par Stress:\par 10/04/17: regadenoson MPI: EF 47%, normal perfusion.\par 03/03/15: exercise MPI: 7 METS, EF 54%, fixed inferior and inferoseptal defects\par 02/29/12: exercise MPI: 7 METS, mild inferior and inferoseptal scar, no ischemia, EF 54%, no RWMA\par 03/02/09: exercise MPI: 7 METS, mild inferior and inferoseptal scar, no ischemia, EF 54%, no RWMA\par ----------------------------\par Echo:\par 04/29/19: EF 55%, LVH, dilated LA, s/p TAVR normally functioning, mild TR. \par 04/03/19: EF 54%, LVH, akinesis basal and mid IL, PPM, s/p 26mm TAVR (PV 2.1m/s, MG 11.8mmHg, LVOT/AV 0.33, NATE 2.4cm2). \par 02/12/19: E 44%, akinesis basal/mid IL, LVH, dilated LA/RA, normal RV function, severe AS (NATE 0.75cm2, PV 3.6m/s, MG 31mmHg, LVOT/AV 0.25, SVI 22ml/m2), PPM, atrial septal aneurysm.\par 10/05/17: EF 38%, grade I diastolic dysfunction, reduced RV function, akinesis apical septum and basal/mid inferolateral walls, severe AS, NATE 0.8cm2, LVOT/AV 0.23, mean gradient 23mmHg. \par 05/12/16: EF 36%, akinesis apical anterior, apical inferior and apical walls, moderate AS, NATE 1.2cm2, moderate TR, mild MR, LVH\par 04/02/15: EF 41%, mid LAD MI, mild AS, NATE 1.7cm2, grade I diastolic dysfunction, dilated LA, mild TR, PASP 36mmHg\par 02/28/12: EF 55%, mild TR, diastolic dysfunction\par 02/14/09: EF 55%, aortic sclerosis, diastolic dysfunction\par -----------------------------\par Tilt table test:\par 08/14/13: + orthostatic induced vasovagal symptoms\par ----------------------------\par Carotid:\par 11/13/19: sono: EMI 40-59% stenosis, LICA patent CEA. \par 03/19/19: sono: 20-39% b/l ICA.\par 11/15/17: sono: EMI 40-59%, s/p left CEA patent\par 04/03/15: sono: s/p left CEA, LICA 20-49%, EMI 50-69%\par ----------------------------\par Cath:\par 04/02/19: successful 26mm Jonas III TAVR\par 03/26/19: RA mid 50%, distal 80%, RDPA competitive flow from graft, LM 40%, LAD prox 100%, Lcx prox 50%, OM1 fills via graft, OM2 60%, LIMA-LAD patent, radial-LPL patent, SVG-diagonal patent, SVG-OM patent, SVG-RPDA patent, AV MG 41mmHg. 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 \par 11/15/17: sono: AAA mid 3.6cm x 3.2cm, RCIA 2.5cm x 2.3cm. LCIA 1.9cm x 1.7cm.\par ----------------------------\par Upper extremity veins:\par 11/17/16: sono: right: no RUE DVT.

## 2020-02-13 NOTE — ASSESSMENT
[FreeTextEntry1] : 1. CAD: s/p CABG x 5 (04/06/15) (Malachi Jose MD), s/p cath 03/26/19: RA mid 50%, distal 80%, RDPA competitive flow from graft, LM 40%, LAD prox 100%, Lcx prox 50%, OM1 fills via graft, OM2 60%, LIMA-LAD patent, radial-LPL patent, SVG-diagonal patent, SVG-OM patent, SVG-RPDA patent, AV MG 41mmHg, s/p CTCA 03/19/19: moderately calcified aortic valve, severe TVD, LIMA-LAD patent, SVG-D1 patent, RA-RPL patent, SVG-OM patent, SVG-RPDA patent,\par             - continue aspirin 81mg po daily\par             - continue Nitroglycerin Tablet Sublingual, 0.4 MG, Sublingual, Once a day as needed\par             - will pursue aggressive medical management\par \par 2. Chronic diastolic heart failure: EF normalized: s/p echo: 04/29/19: EF 55%, LVH, PPM, s/p 26mm TAVR (PV 2.1m/s, MG 11.8mmHg, LVOT/AV 0.33, NATE 2.4cm2). \par             - continue prn loop diuretics, Lasix 80mg po daily prn \par             - discussed with patient importance of maintaining a weight log, avoidance of dietary sodium, as well as appropriate use of loop diuretics\par             - will follow with serial echocardiograms (echocardiogram)\par             - check lab work today\par \par 3. Carotid stenosis: s/p left CEA 12/14/04 (Kareem Swain MD), 11/13/19: sono: EMI 40-59%, left CEA patent\par             - will monitor with periodic carotid sonograms (performed at the vascular surgeon's office)\par             - continue statin and anti-platelet agent\par             - discussed with patient surveillance for neurologic symptoms. \par \par 4. Aortic stenosis: s/p successful 26mm Jonas III TAVR 04/09/19 (John Mccormick MD)\par             - will follow with serial echocardiograms (ordered today)\par  \par 5. Aortic aneurysm, abdominal: s/p sono 11/13/19\par             - follow up with vascular surgeon, Kareem Swain MD. \par             - continue HTN management\par \par 6. HTN: BP not at ACC/AHA 2017 guideline target, ? occasional low reading on ambulatory log, ? symptomatic\par             - continue labetalol 300mg po bid (may hold evening dose if BP less than 120 or take extra 0.5 tablet in am  if SBP >140 post am dose)\par             - continue valsartan 160mg po QD\par \par 7. BPH (benign prostatic hyperplasia): urinary retention post TAVR\par             - continue Finasteride Tablet, 5 MG, 1 tablet, Orally, Once a day\par             - continue Tamsulosin HCl Capsule, 0.4 MG, 1 capsule 30 minutes after the same meal each day, Orally, Once a day\par             - f/u with urologist \par \par 8. Presence of cardiac pacemaker: s/p CRT-P, upgraded from PPM 09/2016 complicated by hematoma requiring device extraction and replacement on the right side (11/2016).\par             - continue follow up with device clinic\par \par 9. Dyslipidemia:\par             - continue atorvastatin 80mg po daily\par             - check lab work today

## 2020-02-14 LAB
ALBUMIN SERPL ELPH-MCNC: 4.3 G/DL
ALP BLD-CCNC: 62 U/L
ALT SERPL-CCNC: 80 U/L
ANION GAP SERPL CALC-SCNC: 13 MMOL/L
AST SERPL-CCNC: 49 U/L
BASOPHILS # BLD AUTO: 0.04 K/UL
BASOPHILS NFR BLD AUTO: 0.6 %
BILIRUB SERPL-MCNC: 1.8 MG/DL
BUN SERPL-MCNC: 17 MG/DL
CALCIUM SERPL-MCNC: 9.4 MG/DL
CHLORIDE SERPL-SCNC: 106 MMOL/L
CHOLEST SERPL-MCNC: 129 MG/DL
CHOLEST/HDLC SERPL: 2.1 RATIO
CO2 SERPL-SCNC: 24 MMOL/L
CREAT SERPL-MCNC: 1.15 MG/DL
EOSINOPHIL # BLD AUTO: 0.11 K/UL
EOSINOPHIL NFR BLD AUTO: 1.5 %
ESTIMATED AVERAGE GLUCOSE: 103 MG/DL
GLUCOSE SERPL-MCNC: 82 MG/DL
HBA1C MFR BLD HPLC: 5.2 %
HCT VFR BLD CALC: 38.3 %
HDLC SERPL-MCNC: 63 MG/DL
HGB BLD-MCNC: 12.1 G/DL
IMM GRANULOCYTES NFR BLD AUTO: 0.3 %
LDLC SERPL CALC-MCNC: 55 MG/DL
LDLC SERPL DIRECT ASSAY-MCNC: 61 MG/DL
LYMPHOCYTES # BLD AUTO: 1.8 K/UL
LYMPHOCYTES NFR BLD AUTO: 24.8 %
MAN DIFF?: NORMAL
MCHC RBC-ENTMCNC: 31.6 GM/DL
MCHC RBC-ENTMCNC: 32.6 PG
MCV RBC AUTO: 103.2 FL
MONOCYTES # BLD AUTO: 0.7 K/UL
MONOCYTES NFR BLD AUTO: 9.7 %
NEUTROPHILS # BLD AUTO: 4.58 K/UL
NEUTROPHILS NFR BLD AUTO: 63.1 %
PLATELET # BLD AUTO: 158 K/UL
POTASSIUM SERPL-SCNC: 4.4 MMOL/L
PROT SERPL-MCNC: 6.6 G/DL
RBC # BLD: 3.71 M/UL
RBC # FLD: 14.2 %
SODIUM SERPL-SCNC: 142 MMOL/L
TRIGL SERPL-MCNC: 53 MG/DL
TSH SERPL-ACNC: 12.2 UIU/ML
WBC # FLD AUTO: 7.25 K/UL

## 2020-05-04 ENCOUNTER — APPOINTMENT (OUTPATIENT)
Dept: HEART AND VASCULAR | Facility: CLINIC | Age: 85
End: 2020-05-04
Payer: MEDICARE

## 2020-05-04 VITALS
HEART RATE: 73 BPM | HEIGHT: 68 IN | SYSTOLIC BLOOD PRESSURE: 184 MMHG | WEIGHT: 171.5 LBS | BODY MASS INDEX: 25.99 KG/M2 | DIASTOLIC BLOOD PRESSURE: 86 MMHG

## 2020-05-04 VITALS — SYSTOLIC BLOOD PRESSURE: 134 MMHG | DIASTOLIC BLOOD PRESSURE: 72 MMHG

## 2020-05-04 PROCEDURE — 99214 OFFICE O/P EST MOD 30 MIN: CPT | Mod: 95

## 2020-05-04 NOTE — REVIEW OF SYSTEMS
[Feeling Fatigued] : feeling fatigued [Dyspnea on exertion] : dyspnea during exertion [Lower Ext Edema] : lower extremity edema [Urinary Frequency] : urinary frequency [Joint Pain] : joint pain [Joint Stiffness] : joint stiffness [Negative] : Heme/Lymph [FreeTextEntry1] : + urinary retention

## 2020-05-04 NOTE — HISTORY OF PRESENT ILLNESS
[FreeTextEntry1] : Mr. 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 He underwent 5-vessel coronary artery bypass grafting on 04/06/15. He had an echocardiogram on 05/12/16 which revealed an EF of 36%, akinesis of the apical anterior, apical inferior, and apical walls, moderate AS, NATE 1.2cm2, moderate TR, mild MR, and mild LVH. His pacemaker was upgraded to a CRT-P on 08/03/16. He had bleeding into the pocket and had pocket revision on 09/28/16. He was evaluated by Antonio Bustilol MD (general surgeon) at [facility] to address wound healing over the device. In 2017, in the office I examined the device wound and noted the wound edges not to be opposed and the device to be partially exposed. He went on to have the device extracted and replaced (11/2016) (Osito Alicea MD). He had an MPI stress test on 10/04/17 which revealed an EF of 47%, normal perfusion.  He underwent successful TAVR 26mm Jonas on 04/02/19  (John Mccormick MD).  The procedure was complicated by urinary retention which has since resolved.  On 03/19/19 he had a CTCA which revealed: moderately calcified aortic valve, severe TVD, LIMA-LAD patent, SVG-D1 patent, RA-RPL patent, SVG-OM patent, and SVG-RPDA patent.  He had an echocardiogram on 04/29/19 which revealed an EF of 55%, LVH, dilated LA, s/p TAVR normally functioning, and mild TR.   His home BP device correlates well with the in-office device.  His home BP Log reveals an average SBP in the 140s-150s.  At present, he has complaints of generalized fatigue but denies chest pain.  His lower extremity edema is mild. \par

## 2020-05-04 NOTE — REASON FOR VISIT
[Follow-Up - Clinic] : a clinic follow-up of [FreeTextEntry1] : Diagnostic Tests:\par -----------------------------------\par ECG:\par 02/13/20: A-paced, V-paced. \par 04/29/19: A-sensed, V-paced at 70 bpm.\par 02/07/19: A-sensed, V-paced at 70 bpm. \par 01/09/18: A-sensed, V-paced at 70 bpm. \par 05/12/16: A-sensed, V-paced at 62 BPM. \par 03/24/15: A-paced, V-paced at 64bpm\par 08/28/13: A-paced, V-sensed, RBBB, LAFB, prolonged A paced to V-sensed delay (288ms)\par -----------------------------------\par CTCA:\par 03/19/19: moderately calcified aortic valve, severe TVD, LIMA-LAD patent, SVG-D1 patent, RA-RPL patent, SVG-OM patent, SVG-RPDA patent. \par 02/24/15: Ca+ score 1027.41 (75th percentile), LM distal 50%, LAD prox 75%, LCX moderate, RCA moderate, PDA 50%, EF 56%, no RWMA\par ----------------------------\par Stress:\par 10/04/17: regadenoson MPI: EF 47%, normal perfusion.\par 03/03/15: exercise MPI: 7 METS, EF 54%, fixed inferior and inferoseptal defects\par 02/29/12: exercise MPI: 7 METS, mild inferior and inferoseptal scar, no ischemia, EF 54%, no RWMA\par 03/02/09: exercise MPI: 7 METS, mild inferior and inferoseptal scar, no ischemia, EF 54%, no RWMA\par ----------------------------\par Echo:\par 04/29/19: EF 55%, LVH, dilated LA, s/p TAVR normally functioning, mild TR. \par 04/03/19: EF 54%, LVH, akinesis basal and mid IL, PPM, s/p 26mm TAVR (PV 2.1m/s, MG 11.8mmHg, LVOT/AV 0.33, NATE 2.4cm2). \par 02/12/19: E 44%, akinesis basal/mid IL, LVH, dilated LA/RA, normal RV function, severe AS (NATE 0.75cm2, PV 3.6m/s, MG 31mmHg, LVOT/AV 0.25, SVI 22ml/m2), PPM, atrial septal aneurysm.\par 10/05/17: EF 38%, grade I diastolic dysfunction, reduced RV function, akinesis apical septum and basal/mid inferolateral walls, severe AS, NATE 0.8cm2, LVOT/AV 0.23, mean gradient 23mmHg. \par 05/12/16: EF 36%, akinesis apical anterior, apical inferior and apical walls, moderate AS, NATE 1.2cm2, moderate TR, mild MR, LVH\par 04/02/15: EF 41%, mid LAD MI, mild AS, NATE 1.7cm2, grade I diastolic dysfunction, dilated LA, mild TR, PASP 36mmHg\par 02/28/12: EF 55%, mild TR, diastolic dysfunction\par 02/14/09: EF 55%, aortic sclerosis, diastolic dysfunction\par -----------------------------\par Tilt table test:\par 08/14/13: + orthostatic induced vasovagal symptoms\par ----------------------------\par Carotid:\par 11/13/19: sono: EMI 40-59% stenosis, LICA patent CEA. \par 03/19/19: sono: 20-39% b/l ICA.\par 11/15/17: sono: EMI 40-59%, s/p left CEA patent\par 04/03/15: sono: s/p left CEA, LICA 20-49%, EMI 50-69%\par ----------------------------\par Cath:\par 04/02/19: successful 26mm Jonas III TAVR\par 03/26/19: RA mid 50%, distal 80%, RDPA competitive flow from graft, LM 40%, LAD prox 100%, Lcx prox 50%, OM1 fills via graft, OM2 60%, LIMA-LAD patent, radial-LPL patent, SVG-diagonal patent, SVG-OM patent, SVG-RPDA patent, AV MG 41mmHg. 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 \par 11/15/17: sono: AAA mid 3.6cm x 3.2cm, RCIA 2.5cm x 2.3cm. LCIA 1.9cm x 1.7cm.\par ----------------------------\par Upper extremity veins:\par 11/17/16: sono: right: no RUE DVT.

## 2020-05-04 NOTE — PHYSICAL EXAM
[Normal Conjunctiva] : the conjunctiva exhibited no abnormalities [Normal Oral Mucosa] : normal oral mucosa [Eyelids - No Xanthelasma] : the eyelids demonstrated no xanthelasmas [No Oral Pallor] : no oral pallor [No Oral Cyanosis] : no oral cyanosis [Normal Jugular Venous V Waves Present] : normal jugular venous V waves present [Normal Jugular Venous A Waves Present] : normal jugular venous A waves present [No Jugular Venous Bella A Waves] : no jugular venous bella A waves [Heart Sounds] : normal S1 and S2 [Heart Rate And Rhythm] : heart rate and rhythm were normal [Murmurs] : no murmurs present [Bowel Sounds] : normal bowel sounds [Abdomen Soft] : soft [Abdomen Tenderness] : non-tender [Abdomen Mass (___ Cm)] : no abdominal mass palpated [Abnormal Walk] : normal gait [Gait - Sufficient For Exercise Testing] : the gait was sufficient for exercise testing [Cyanosis, Localized] : no localized cyanosis [Nail Clubbing] : no clubbing of the fingernails [Petechial Hemorrhages (___cm)] : no petechial hemorrhages [Skin Color & Pigmentation] : normal skin color and pigmentation [No Venous Stasis] : no venous stasis [Skin Lesions] : no skin lesions [No Skin Ulcers] : no skin ulcer [No Xanthoma] : no  xanthoma was observed [FreeTextEntry1] : + multiple ecchymosis  [Oriented To Time, Place, And Person] : oriented to person, place, and time [Affect] : the affect was normal [Mood] : the mood was normal [No Anxiety] : not feeling anxious [General Appearance - Well Developed] : well developed [Normal Appearance] : normal appearance [Well Groomed] : well groomed [General Appearance - Well Nourished] : well nourished [No Deformities] : no deformities [General Appearance - In No Acute Distress] : no acute distress [Respiration, Rhythm And Depth] : normal respiratory rhythm and effort [] : no respiratory distress [Exaggerated Use Of Accessory Muscles For Inspiration] : no accessory muscle use [Auscultation Breath Sounds / Voice Sounds] : lungs were clear to auscultation bilaterally

## 2020-05-04 NOTE — ASSESSMENT
[FreeTextEntry1] : 1. CAD: s/p CABG x 5 (04/06/15) (Malachi Jose MD), s/p cath 03/26/19: RA mid 50%, distal 80%, RDPA competitive flow from graft, LM 40%, LAD prox 100%, Lcx prox 50%, OM1 fills via graft, OM2 60%, LIMA-LAD patent, radial-LPL patent, SVG-diagonal patent, SVG-OM patent, SVG-RPDA patent, AV MG 41mmHg, s/p CTCA 03/19/19: moderately calcified aortic valve, severe TVD, LIMA-LAD patent, SVG-D1 patent, RA-RPL patent, SVG-OM patent, SVG-RPDA patent,\par             - continue aspirin 81mg po daily\par             - continue Nitroglycerin Tablet Sublingual, 0.4 MG, Sublingual, Once a day as needed\par             - will pursue aggressive medical management\par \par 2. Chronic diastolic heart failure: EF normalized: s/p echo: 04/29/19: EF 55%, LVH, PPM, s/p 26mm TAVR (PV 2.1m/s, MG 11.8mmHg, LVOT/AV 0.33, NATE 2.4cm2). \par             - continue prn loop diuretics, Lasix 80mg po daily prn \par             - discussed with patient importance of maintaining a weight log, avoidance of dietary sodium, as well as appropriate use of loop diuretics\par             - will follow with serial echocardiograms (ordered today, will have at time of next visit)\par \par 3. Carotid stenosis: s/p left CEA 12/14/04 (Kareem Swain MD), 11/13/19: sono: EMI 40-59%, left CEA patent\par             - will monitor with periodic carotid sonograms (performed at the vascular surgeon's office)\par             - continue statin and anti-platelet agent\par             - discussed with patient surveillance for neurologic symptoms. \par \par 4. Aortic stenosis: s/p successful 26mm Jonas III TAVR 04/09/19 (John Mccormick MD)\par             - will follow with serial echocardiograms (ordered today, will have at time of next in-office visit)\par  \par 5. Aortic aneurysm, abdominal: s/p sono 11/13/19\par             - follow up with vascular surgeon, Kareem Swain MD. \par             - continue HTN management\par \par 6. HTN: BP at ACC/AHA 2017 guideline target, ? occasional low reading on ambulatory log, ? symptomatic\par             - continue labetalol 300mg po bid (may hold evening dose if BP less than 120 or take extra 0.5 tablet in am  if SBP >140 post am dose)\par             - continue valsartan 160mg po QD\par \par 7. BPH (benign prostatic hyperplasia): urinary retention post TAVR\par             - continue Finasteride Tablet, 5 MG, 1 tablet, Orally, Once a day\par             - continue Tamsulosin HCl Capsule, 0.4 MG, 1 capsule 30 minutes after the same meal each day, Orally, Once a day\par             - f/u with urologist \par \par 8. Presence of cardiac pacemaker: s/p CRT-P, upgraded from PPM 09/2016 complicated by hematoma requiring device extraction and replacement on the right side (11/2016).\par             - continue follow up with device clinic\par \par 9. Dyslipidemia: lipids at goal\par             - continue atorvastatin 80mg po daily\par \par \par Due to the current global COVID-19 pandemic and the recommendations for social distancing this encounter was converted from an in-person face-to-face encounter to a telehealth encounter employing the [employer] (or other approved) audio/video platform.  All components of the evaluation and management were performed per clinical routine with the exception of the physical exam.  The physical exam references my most recent physical exam plus any additional information provided by the patient (i.e. ambulatory vitals/weight) or inspection from the video portion of the encounter.  I spent in excess of  25 minutes on the encounter.  \par \par Verbal consent was given on 05/04/20 at 9:00am by Markus Mays. \par

## 2020-05-07 ENCOUNTER — APPOINTMENT (OUTPATIENT)
Dept: HEART AND VASCULAR | Facility: CLINIC | Age: 85
End: 2020-05-07

## 2020-08-20 ENCOUNTER — APPOINTMENT (OUTPATIENT)
Dept: HEART AND VASCULAR | Facility: CLINIC | Age: 85
End: 2020-08-20
Payer: MEDICARE

## 2020-08-20 ENCOUNTER — LABORATORY RESULT (OUTPATIENT)
Age: 85
End: 2020-08-20

## 2020-08-20 VITALS
BODY MASS INDEX: 26.14 KG/M2 | DIASTOLIC BLOOD PRESSURE: 84 MMHG | WEIGHT: 172.44 LBS | OXYGEN SATURATION: 99 % | TEMPERATURE: 98.4 F | HEIGHT: 68 IN | SYSTOLIC BLOOD PRESSURE: 171 MMHG | HEART RATE: 71 BPM

## 2020-08-20 VITALS — DIASTOLIC BLOOD PRESSURE: 76 MMHG | SYSTOLIC BLOOD PRESSURE: 150 MMHG

## 2020-08-20 PROCEDURE — 99215 OFFICE O/P EST HI 40 MIN: CPT

## 2020-08-20 PROCEDURE — 93306 TTE W/DOPPLER COMPLETE: CPT

## 2020-08-21 LAB
ALBUMIN SERPL ELPH-MCNC: 4.4 G/DL
ALP BLD-CCNC: 67 U/L
ALT SERPL-CCNC: 32 U/L
ANION GAP SERPL CALC-SCNC: 13 MMOL/L
AST SERPL-CCNC: 33 U/L
BASOPHILS # BLD AUTO: 0.04 K/UL
BASOPHILS NFR BLD AUTO: 0.7 %
BILIRUB SERPL-MCNC: 1.6 MG/DL
BUN SERPL-MCNC: 18 MG/DL
CALCIUM SERPL-MCNC: 9.6 MG/DL
CHLORIDE SERPL-SCNC: 106 MMOL/L
CHOLEST SERPL-MCNC: 115 MG/DL
CHOLEST/HDLC SERPL: 2.1 RATIO
CO2 SERPL-SCNC: 24 MMOL/L
CREAT SERPL-MCNC: 1.1 MG/DL
EOSINOPHIL # BLD AUTO: 0.16 K/UL
EOSINOPHIL NFR BLD AUTO: 2.6 %
ESTIMATED AVERAGE GLUCOSE: 100 MG/DL
GLUCOSE SERPL-MCNC: 101 MG/DL
HBA1C MFR BLD HPLC: 5.1 %
HCT VFR BLD CALC: 38.9 %
HDLC SERPL-MCNC: 55 MG/DL
HGB BLD-MCNC: 12.3 G/DL
IMM GRANULOCYTES NFR BLD AUTO: 0.2 %
LDLC SERPL CALC-MCNC: 49 MG/DL
LDLC SERPL DIRECT ASSAY-MCNC: 54 MG/DL
LYMPHOCYTES # BLD AUTO: 1.44 K/UL
LYMPHOCYTES NFR BLD AUTO: 23.6 %
MAN DIFF?: NORMAL
MCHC RBC-ENTMCNC: 31.6 GM/DL
MCHC RBC-ENTMCNC: 32.5 PG
MCV RBC AUTO: 102.9 FL
MONOCYTES # BLD AUTO: 0.47 K/UL
MONOCYTES NFR BLD AUTO: 7.7 %
NEUTROPHILS # BLD AUTO: 3.97 K/UL
NEUTROPHILS NFR BLD AUTO: 65.2 %
PLATELET # BLD AUTO: 160 K/UL
POTASSIUM SERPL-SCNC: 4.6 MMOL/L
PROT SERPL-MCNC: 6.6 G/DL
RBC # BLD: 3.78 M/UL
RBC # FLD: 13.6 %
SODIUM SERPL-SCNC: 143 MMOL/L
T3RU NFR SERPL: 1 TBI
T4 SERPL-MCNC: 7.4 UG/DL
TRIGL SERPL-MCNC: 51 MG/DL
TSH SERPL-ACNC: 3.07 UIU/ML
WBC # FLD AUTO: 6.09 K/UL

## 2020-08-21 NOTE — REASON FOR VISIT
[Follow-Up - Clinic] : a clinic follow-up of [FreeTextEntry1] : Diagnostic Tests:\par -----------------------------------\par ECG:\par 02/13/20: A-paced, V-paced. \par 04/29/19: A-sensed, V-paced at 70 bpm.\par 02/07/19: A-sensed, V-paced at 70 bpm. \par 01/09/18: A-sensed, V-paced at 70 bpm. \par 05/12/16: A-sensed, V-paced at 62 BPM. \par 03/24/15: A-paced, V-paced at 64bpm\par 08/28/13: A-paced, V-sensed, RBBB, LAFB, prolonged A paced to V-sensed delay (288ms)\par -----------------------------------\par CTCA:\par 03/19/19: moderately calcified aortic valve, severe TVD, LIMA-LAD patent, SVG-D1 patent, RA-RPL patent, SVG-OM patent, SVG-RPDA patent. \par 02/24/15: Ca+ score 1027.41 (75th percentile), LM distal 50%, LAD prox 75%, LCX moderate, RCA moderate, PDA 50%, EF 56%, no RWMA\par ----------------------------\par Stress:\par 10/04/17: regadenoson MPI: EF 47%, normal perfusion.\par 03/03/15: exercise MPI: 7 METS, EF 54%, fixed inferior and inferoseptal defects\par 02/29/12: exercise MPI: 7 METS, mild inferior and inferoseptal scar, no ischemia, EF 54%, no RWMA\par 03/02/09: exercise MPI: 7 METS, mild inferior and inferoseptal scar, no ischemia, EF 54%, no RWMA\par ----------------------------\par Echo:\par 08/20/20: EF 49%, hypokinesis basal and mid inferior and basal inferolateral walls, grade I diastolic dysfunction, 26mm Jonas TAVR, mild-moderate paravalvular AI, PPM.\par 04/29/19: EF 55%, LVH, dilated LA, s/p TAVR normally functioning, mild TR. \par 04/03/19: EF 54%, LVH, akinesis basal and mid IL, PPM, s/p 26mm TAVR (PV 2.1m/s, MG 11.8mmHg, LVOT/AV 0.33, NATE 2.4cm2). \par 02/12/19: E 44%, akinesis basal/mid IL, LVH, dilated LA/RA, normal RV function, severe AS (NATE 0.75cm2, PV 3.6m/s, MG 31mmHg, LVOT/AV 0.25, SVI 22ml/m2), PPM, atrial septal aneurysm.\par 10/05/17: EF 38%, grade I diastolic dysfunction, reduced RV function, akinesis apical septum and basal/mid inferolateral walls, severe AS, NATE 0.8cm2, LVOT/AV 0.23, mean gradient 23mmHg. \par 05/12/16: EF 36%, akinesis apical anterior, apical inferior and apical walls, moderate AS, NATE 1.2cm2, moderate TR, mild MR, LVH\par 04/02/15: EF 41%, mid LAD MI, mild AS, NATE 1.7cm2, grade I diastolic dysfunction, dilated LA, mild TR, PASP 36mmHg\par 02/28/12: EF 55%, mild TR, diastolic dysfunction\par 02/14/09: EF 55%, aortic sclerosis, diastolic dysfunction\par -----------------------------\par Tilt table test:\par 08/14/13: + orthostatic induced vasovagal symptoms\par ----------------------------\par Carotid:\par 11/13/19: sono: EMI 40-59% stenosis, LICA patent CEA. \par 03/19/19: sono: 20-39% b/l ICA.\par 11/15/17: sono: EMI 40-59%, s/p left CEA patent\par 04/03/15: sono: s/p left CEA, LICA 20-49%, EMI 50-69%\par ----------------------------\par Cath:\par 04/02/19: successful 26mm Jonas III TAVR\par 03/26/19: RA mid 50%, distal 80%, RDPA competitive flow from graft, LM 40%, LAD prox 100%, Lcx prox 50%, OM1 fills via graft, OM2 60%, LIMA-LAD patent, radial-LPL patent, SVG-diagonal patent, SVG-OM patent, SVG-RPDA patent, AV MG 41mmHg. 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 \par 11/15/17: sono: AAA mid 3.6cm x 3.2cm, RCIA 2.5cm x 2.3cm. LCIA 1.9cm x 1.7cm.\par ----------------------------\par Upper extremity veins:\par 11/17/16: sono: right: no RUE DVT.

## 2020-08-21 NOTE — ASSESSMENT
[FreeTextEntry1] : 1. CAD: s/p CABG x 5 (04/06/15) (Malachi Jose MD), s/p cath 03/26/19: RA mid 50%, distal 80%, RDPA competitive flow from graft, LM 40%, LAD prox 100%, Lcx prox 50%, OM1 fills via graft, OM2 60%, LIMA-LAD patent, radial-LPL patent, SVG-diagonal patent, SVG-OM patent, SVG-RPDA patent, AV MG 41mmHg, s/p CTCA 03/19/19: moderately calcified aortic valve, severe TVD, LIMA-LAD patent, SVG-D1 patent, RA-RPL patent, SVG-OM patent, SVG-RPDA patent,\par             - continue aspirin 81mg po daily\par             - will pursue aggressive medical management\par \par 2. Chronic diastolic heart failure: EF improved: s/p echo: 08/20/20: EF 49%, hypokinesis basal and mid inferior and basal inferolateral walls, grade I diastolic dysfunction, 26mm Jonas TAVR, mild-moderate paravalvular AI, PPM.\par             - continue prn loop diuretics, Lasix 80mg po daily prn \par             - discussed with patient importance of maintaining a weight log, avoidance of dietary sodium, as well as appropriate use of loop diuretics\par             - will follow with serial echocardiograms\par \par 3. Carotid stenosis: s/p left CEA 12/14/04 (Kareem Swain MD), 11/13/19: sono: EMI 40-59%, left CEA patent\par             - will monitor with periodic carotid sonograms (performed at the vascular surgeon's office)\par             - continue statin and anti-platelet agent\par             - discussed with patient surveillance for neurologic symptoms. \par \par 4. Aortic stenosis: s/p successful 26mm Jonas III TAVR 04/09/19 (John Mccormick MD): s/p eCHO: 08/20/20: EF 49%, hypokinesis basal and mid inferior and basal inferolateral walls, grade I diastolic dysfunction, 26mm Joans TAVR, mild-moderate paravalvular AI, PPM.\par             - will follow with serial echocardiograms\par  \par 5. Aortic aneurysm, abdominal: s/p sono 11/13/19\par             - follow up with vascular surgeon, Kareem Swain MD. \par             - continue HTN management\par \par 6. HTN: BP not at ACC/AHA 2017 guideline target, ? occasional low reading on ambulatory log, ? symptomatic\par             - continue labetalol 300mg po bid (may hold evening dose if BP less than 120 or take extra 0.5 tablet in am  if SBP >140 post am dose)\par             - continue valsartan 160mg po QD\par             - if BP remains above target next visit will up titrate anti-HTN regimen\par \par 7. BPH (benign prostatic hyperplasia): urinary retention post TAVR\par             - continue Finasteride Tablet, 5 MG, 1 tablet, Orally, Once a day\par             - continue Tamsulosin HCl Capsule, 0.4 MG, 1 capsule 30 minutes after the same meal each day, Orally, Once a day\par             - f/u with urologist \par \par 8. Presence of cardiac pacemaker: s/p CRT-P, upgraded from PPM 09/2016 complicated by hematoma requiring device extraction and replacement on the right side (11/2016).\par             - continue follow up with device clinic (will arrange device check)\par \par 9. Dyslipidemia: lipids at goal\par             - continue atorvastatin 80mg po daily\par \par 10. Hypothyroidism: \par             - check lab work today

## 2020-08-21 NOTE — PHYSICAL EXAM
[Normal Conjunctiva] : the conjunctiva exhibited no abnormalities [Eyelids - No Xanthelasma] : the eyelids demonstrated no xanthelasmas [Normal Oral Mucosa] : normal oral mucosa [No Oral Pallor] : no oral pallor [Normal Jugular Venous A Waves Present] : normal jugular venous A waves present [No Oral Cyanosis] : no oral cyanosis [Normal Jugular Venous V Waves Present] : normal jugular venous V waves present [No Jugular Venous Bella A Waves] : no jugular venous bella A waves [Heart Rate And Rhythm] : heart rate and rhythm were normal [Heart Sounds] : normal S1 and S2 [Murmurs] : no murmurs present [Abdomen Tenderness] : non-tender [Abdomen Soft] : soft [Bowel Sounds] : normal bowel sounds [Abdomen Mass (___ Cm)] : no abdominal mass palpated [Abnormal Walk] : normal gait [Gait - Sufficient For Exercise Testing] : the gait was sufficient for exercise testing [Cyanosis, Localized] : no localized cyanosis [Nail Clubbing] : no clubbing of the fingernails [Petechial Hemorrhages (___cm)] : no petechial hemorrhages [No Venous Stasis] : no venous stasis [Skin Color & Pigmentation] : normal skin color and pigmentation [Skin Lesions] : no skin lesions [No Xanthoma] : no  xanthoma was observed [No Skin Ulcers] : no skin ulcer [Oriented To Time, Place, And Person] : oriented to person, place, and time [Affect] : the affect was normal [No Anxiety] : not feeling anxious [Mood] : the mood was normal [Normal Appearance] : normal appearance [Well Groomed] : well groomed [General Appearance - Well Developed] : well developed [No Deformities] : no deformities [General Appearance - Well Nourished] : well nourished [General Appearance - In No Acute Distress] : no acute distress [Exaggerated Use Of Accessory Muscles For Inspiration] : no accessory muscle use [] : no respiratory distress [Respiration, Rhythm And Depth] : normal respiratory rhythm and effort [Auscultation Breath Sounds / Voice Sounds] : lungs were clear to auscultation bilaterally [FreeTextEntry1] : + multiple ecchymosis

## 2020-08-21 NOTE — HISTORY OF PRESENT ILLNESS
[FreeTextEntry1] : Mr. 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 He underwent 5-vessel coronary artery bypass grafting on 04/06/15. He had an echocardiogram on 05/12/16 which revealed an EF of 36%, akinesis of the apical anterior, apical inferior, and apical walls, moderate AS, NATE 1.2cm2, moderate TR, mild MR, and mild LVH. His pacemaker was upgraded to a CRT-P on 08/03/16. He had bleeding into the pocket and had pocket revision on 09/28/16. He was evaluated by Antonio Bustillo MD (general surgeon) at [facility] to address wound healing over the device. In 2017, in the office I examined the device wound and noted the wound edges not to be opposed and the device to be partially exposed. He went on to have the device extracted and replaced (11/2016) (Osito Alicea MD). He had an MPI stress test on 10/04/17 which revealed an EF of 47%, normal perfusion.  He underwent successful TAVR 26mm Jonas on 04/02/19  (John Mccormick MD).  The procedure was complicated by urinary retention which has since resolved.  On 03/19/19 he had a CTCA which revealed: moderately calcified aortic valve, severe TVD, LIMA-LAD patent, SVG-D1 patent, RA-RPL patent, SVG-OM patent, and SVG-RPDA patent.  He had an echocardiogram on 08/20/20 which revealed an EF of 49%, hypokinesis basal and mid inferior and basal inferolateral walls, grade I diastolic dysfunction, 26mm Jonas TAVR, mild-moderate paravalvular AI, and PPM.   His home BP device correlates well with the in-office device.  His home BP Log reveals an average SBP in the 140s-150s.  At present, he has complaints of generalized fatigue but denies chest pain.  His lower extremity edema is mild. \par \par \par

## 2020-11-03 ENCOUNTER — FORM ENCOUNTER (OUTPATIENT)
Age: 85
End: 2020-11-03

## 2020-11-04 ENCOUNTER — OUTPATIENT (OUTPATIENT)
Dept: OUTPATIENT SERVICES | Facility: HOSPITAL | Age: 85
LOS: 1 days | End: 2020-11-04
Payer: MEDICARE

## 2020-11-04 DIAGNOSIS — Z95.0 PRESENCE OF CARDIAC PACEMAKER: Chronic | ICD-10-CM

## 2020-11-04 DIAGNOSIS — I25.10 ATHEROSCLEROTIC HEART DISEASE OF NATIVE CORONARY ARTERY WITHOUT ANGINA PECTORIS: ICD-10-CM

## 2020-11-04 DIAGNOSIS — Z95.1 PRESENCE OF AORTOCORONARY BYPASS GRAFT: Chronic | ICD-10-CM

## 2020-11-04 DIAGNOSIS — Z98.890 OTHER SPECIFIED POSTPROCEDURAL STATES: Chronic | ICD-10-CM

## 2020-11-04 PROCEDURE — 78452 HT MUSCLE IMAGE SPECT MULT: CPT | Mod: 26

## 2020-11-04 PROCEDURE — 93018 CV STRESS TEST I&R ONLY: CPT

## 2020-11-04 PROCEDURE — 93016 CV STRESS TEST SUPVJ ONLY: CPT

## 2020-11-05 ENCOUNTER — APPOINTMENT (OUTPATIENT)
Dept: HEART AND VASCULAR | Facility: CLINIC | Age: 85
End: 2020-11-05

## 2020-11-06 PROCEDURE — 93017 CV STRESS TEST TRACING ONLY: CPT

## 2020-11-06 PROCEDURE — 78452 HT MUSCLE IMAGE SPECT MULT: CPT

## 2020-11-06 PROCEDURE — A9500: CPT

## 2020-11-06 PROCEDURE — A9505: CPT

## 2020-11-09 ENCOUNTER — APPOINTMENT (OUTPATIENT)
Dept: HEART AND VASCULAR | Facility: CLINIC | Age: 85
End: 2020-11-09
Payer: MEDICARE

## 2020-11-09 ENCOUNTER — APPOINTMENT (OUTPATIENT)
Dept: HEART AND VASCULAR | Facility: CLINIC | Age: 85
End: 2020-11-09

## 2020-11-09 VITALS
WEIGHT: 173 LBS | HEART RATE: 70 BPM | HEIGHT: 68 IN | BODY MASS INDEX: 26.22 KG/M2 | DIASTOLIC BLOOD PRESSURE: 79 MMHG | SYSTOLIC BLOOD PRESSURE: 163 MMHG

## 2020-11-09 PROCEDURE — 99215 OFFICE O/P EST HI 40 MIN: CPT | Mod: 25

## 2020-11-09 NOTE — HISTORY OF PRESENT ILLNESS
[FreeTextEntry1] : Mr. 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 He underwent 5-vessel coronary artery bypass grafting on 04/06/15. He had an echocardiogram on 05/12/16 which revealed an EF of 36%, akinesis of the apical anterior, apical inferior, and apical walls, moderate AS, NATE 1.2cm2, moderate TR, mild MR, and mild LVH. His pacemaker was upgraded to a CRT-P on 08/03/16. He had bleeding into the pocket and had pocket revision on 09/28/16. He was evaluated by Antonio Bustillo MD (general surgeon) at [facility] to address wound healing over the device. In 2017, in the office I examined the device wound and noted the wound edges not to be opposed and the device to be partially exposed. He went on to have the device extracted and replaced (11/2016) (Osito Alicea MD). He had an MPI stress test on 10/04/17 which revealed an EF of 47%, normal perfusion.  He underwent successful TAVR 26mm Jonas on 04/02/19  (John Mccormick MD).  The procedure was complicated by urinary retention which has since resolved.  On 03/19/19 he had a CTCA which revealed: moderately calcified aortic valve, severe TVD, LIMA-LAD patent, SVG-D1 patent, RA-RPL patent, SVG-OM patent, and SVG-RPDA patent.  He had an echocardiogram on 08/20/20 which revealed an EF of 49%, hypokinesis basal and mid inferior and basal inferolateral walls, grade I diastolic dysfunction, 26mm Jonas TAVR, mild-moderate paravalvular AI, and PPM.   He had a regadenoson MPI stress test on 11/04/20 which revealed an EF of 59% and normal perfusion.  His home BP device correlates well with the in-office device.   At present, he has complaints of generalized fatigue but denies chest pain or edema.  His lower extremity edema is mild.  His ambulatory BP log reveals an average SBP of 130.  \par \par \par

## 2020-11-09 NOTE — ASSESSMENT
[FreeTextEntry1] : 1. CAD: s/p CABG x 5 (04/06/15) (Malachi Jose MD), s/p cath 03/26/19: RA mid 50%, distal 80%, RDPA competitive flow from graft, LM 40%, LAD prox 100%, Lcx prox 50%, OM1 fills via graft, OM2 60%, LIMA-LAD patent, radial-LPL patent, SVG-diagonal patent, SVG-OM patent, SVG-RPDA patent, AV MG 41mmHg, s/p CTCA 03/19/19: moderately calcified aortic valve, severe TVD, LIMA-LAD patent, SVG-D1 patent, RA-RPL patent, SVG-OM patent, SVG-RPDA patent,\par             - continue aspirin 81mg po daily\par             - will pursue aggressive medical management\par \par 2. Chronic diastolic heart failure: EF improved: s/p echo: 08/20/20: EF 49%, hypokinesis basal and mid inferior and basal inferolateral walls, grade I diastolic dysfunction, 26mm Jonas TAVR, mild-moderate paravalvular AI, PPM.\par             - continue prn loop diuretics, Lasix 80mg po daily prn \par             - discussed with patient importance of maintaining a weight log, avoidance of dietary sodium, as well as appropriate use of loop diuretics\par             - will follow with serial echocardiograms\par \par 3. Carotid stenosis: s/p left CEA 12/14/04 (Kareem Swain MD), 10/14/20: sono: EMI 40-59%, left CEA patent\par             - will monitor with periodic carotid sonograms (performed at the vascular surgeon's office)\par             - continue statin and anti-platelet agent\par             - discussed with patient surveillance for neurologic symptoms. \par \par 4. Aortic stenosis: s/p successful 26mm Jonas III TAVR 04/09/19 (John Mccormick MD): s/p eCHO: 08/20/20: EF 49%, hypokinesis basal and mid inferior and basal inferolateral walls, grade I diastolic dysfunction, 26mm Jonas TAVR, mild-moderate paravalvular AI, PPM.\par             - will follow with serial echocardiograms\par  \par 5. Aortic aneurysm, abdominal: 10/14/20: sono: mid aorta aneurysm 3.89cm x 3.85cm, RCIA aneurysm 2.58cm x 2.47cm. \par             - follow up with vascular surgeon, Kareem Swain MD. \par             - continue HTN management\par \par 6. HTN: BP not at ACC/AHA 2017 guideline target, ? occasional low reading on ambulatory log, ? symptomatic\par             - continue labetalol 300mg po bid (may hold evening dose if BP less than 120 or take extra 0.5 tablet in am  if SBP >140 post am dose)\par             - continue valsartan 160mg po QD\par             - if BP remains above target next visit will up titrate anti-HTN regimen\par \par 7. BPH (benign prostatic hyperplasia): urinary retention post TAVR\par             - continue Finasteride Tablet, 5 MG, 1 tablet, Orally, Once a day\par             - continue Tamsulosin HCl Capsule, 0.4 MG, 1 capsule 30 minutes after the same meal each day, Orally, Once a day\par             - f/u with urologist \par \par 8. Presence of cardiac pacemaker: s/p CRT-P, upgraded from PPM 09/2016 complicated by hematoma requiring device extraction and replacement on the right side (11/2016).\par             - continue follow up with device clinic (will arrange device check)\par \par 9. Dyslipidemia: lipids at goal\par             - continue atorvastatin 80mg po daily\par \par Due to the current global COVID-19 pandemic and the recommendations for social distancing this encounter was converted from an in-person face-to-face encounter to a telehealth encounter employing the [employer], [employer], or other approved audio/video platform.  All components of the evaluation and management were performed per clinical routine with the exception of the physical exam.  The physical exam references my most recent physical exam plus any additional information provided by the patient (i.e. ambulatory vitals/weight) or inspection from the video portion of the encounter. \par \par I spent in excess of 40 minutes on the encounter.  \par \par Verbal consent was given on 11/09/20 by Markus Mays. \par \par Patient location: The patient was located at the primary address listed in the medical record.\par Physician location: I was located in my office in [facility].\par

## 2020-11-09 NOTE — REASON FOR VISIT
[FreeTextEntry1] : Diagnostic Tests:\par -----------------------------------\par ECG:\par 02/13/20: A-paced, V-paced. \par 04/29/19: A-sensed, V-paced at 70 bpm.\par 02/07/19: A-sensed, V-paced at 70 bpm. \par 01/09/18: A-sensed, V-paced at 70 bpm. \par 05/12/16: A-sensed, V-paced at 62 BPM. \par 03/24/15: A-paced, V-paced at 64bpm\par 08/28/13: A-paced, V-sensed, RBBB, LAFB, prolonged A paced to V-sensed delay (288ms)\par -----------------------------------\par CTCA:\par 03/19/19: moderately calcified aortic valve, severe TVD, LIMA-LAD patent, SVG-D1 patent, RA-RPL patent, SVG-OM patent, SVG-RPDA patent. \par 02/24/15: Ca+ score 1027.41 (75th percentile), LM distal 50%, LAD prox 75%, LCX moderate, RCA moderate, PDA 50%, EF 56%, no RWMA\par ----------------------------\par Stress:\par 11/04/20: regadenoson MPI: EF 59%, normal perfusion. \par 10/04/17: regadenoson MPI: EF 47%, normal perfusion.\par 03/03/15: exercise MPI: 7 METS, EF 54%, fixed inferior and inferoseptal defects\par 02/29/12: exercise MPI: 7 METS, mild inferior and inferoseptal scar, no ischemia, EF 54%, no RWMA\par 03/02/09: exercise MPI: 7 METS, mild inferior and inferoseptal scar, no ischemia, EF 54%, no RWMA\par ----------------------------\par Echo:\par 08/20/20: EF 49%, hypokinesis basal and mid inferior and basal inferolateral walls, grade I diastolic dysfunction, 26mm Jonas TAVR, mild-moderate paravalvular AI, PPM.\par 04/29/19: EF 55%, LVH, dilated LA, s/p TAVR normally functioning, mild TR. \par 04/03/19: EF 54%, LVH, akinesis basal and mid IL, PPM, s/p 26mm TAVR (PV 2.1m/s, MG 11.8mmHg, LVOT/AV 0.33, NATE 2.4cm2). \par 02/12/19: E 44%, akinesis basal/mid IL, LVH, dilated LA/RA, normal RV function, severe AS (NATE 0.75cm2, PV 3.6m/s, MG 31mmHg, LVOT/AV 0.25, SVI 22ml/m2), PPM, atrial septal aneurysm.\par 10/05/17: EF 38%, grade I diastolic dysfunction, reduced RV function, akinesis apical septum and basal/mid inferolateral walls, severe AS, NATE 0.8cm2, LVOT/AV 0.23, mean gradient 23mmHg. \par 05/12/16: EF 36%, akinesis apical anterior, apical inferior and apical walls, moderate AS, NATE 1.2cm2, moderate TR, mild MR, LVH\par 04/02/15: EF 41%, mid LAD MI, mild AS, NATE 1.7cm2, grade I diastolic dysfunction, dilated LA, mild TR, PASP 36mmHg\par 02/28/12: EF 55%, mild TR, diastolic dysfunction\par 02/14/09: EF 55%, aortic sclerosis, diastolic dysfunction\par -----------------------------\par Tilt table test:\par 08/14/13: + orthostatic induced vasovagal symptoms\par ----------------------------\par Carotid:\par 10/14/20: sono: EMI 40-59% stenosis, LICA patent CEA. \par 11/13/19: sono: EMI 40-59% stenosis, LICA patent CEA. \par 03/19/19: sono: 20-39% b/l ICA.\par 11/15/17: sono: EMI 40-59%, s/p left CEA patent\par 04/03/15: sono: s/p left CEA, LICA 20-49%, EMI 50-69%\par ----------------------------\par Cath:\par 04/02/19: successful 26mm Jonas III TAVR\par 03/26/19: RA mid 50%, distal 80%, RDPA competitive flow from graft, LM 40%, LAD prox 100%, Lcx prox 50%, OM1 fills via graft, OM2 60%, LIMA-LAD patent, radial-LPL patent, SVG-diagonal patent, SVG-OM patent, SVG-RPDA patent, AV MG 41mmHg. \par 03/16/15: EF 50%, LAD MI, LM distal 40%, LAD prox 95%, mid 80%, D3 50%, LCX prox 50%, distal 90%, LPL ostium 80%, RCA mid 40%, pDA 100% with collaterals from LAD\par ----------------------------\par Aorta-Iliac:\par 10/14/20: sono: mid aorta aneurysm 3.89cm x 3.85cm, RCIA aneurysm 2.58cm x 2.47cm. \par 11/13/19: sono: mid aorta aneurysm 3.98cm x 3.88cm, RCIA aneurysm 2.54cm x 2.47cm. \par 11/15/17: sono: AAA mid 3.6cm x 3.2cm, RCIA 2.5cm x 2.3cm. LCIA 1.9cm x 1.7cm.\par ----------------------------\par Upper extremity veins:\par 11/17/16: sono: right: no RUE DVT.

## 2020-11-24 ENCOUNTER — RX RENEWAL (OUTPATIENT)
Age: 85
End: 2020-11-24

## 2021-03-02 ENCOUNTER — APPOINTMENT (OUTPATIENT)
Dept: HEART AND VASCULAR | Facility: CLINIC | Age: 86
End: 2021-03-02

## 2021-04-19 ENCOUNTER — NON-APPOINTMENT (OUTPATIENT)
Age: 86
End: 2021-04-19

## 2021-04-19 ENCOUNTER — APPOINTMENT (OUTPATIENT)
Dept: HEART AND VASCULAR | Facility: CLINIC | Age: 86
End: 2021-04-19
Payer: MEDICARE

## 2021-04-19 PROCEDURE — 93294 REM INTERROG EVL PM/LDLS PM: CPT

## 2021-04-19 PROCEDURE — 93296 REM INTERROG EVL PM/IDS: CPT

## 2021-06-01 ENCOUNTER — APPOINTMENT (OUTPATIENT)
Dept: HEART AND VASCULAR | Facility: CLINIC | Age: 86
End: 2021-06-01
Payer: MEDICARE

## 2021-06-01 ENCOUNTER — NON-APPOINTMENT (OUTPATIENT)
Age: 86
End: 2021-06-01

## 2021-06-01 VITALS — DIASTOLIC BLOOD PRESSURE: 48 MMHG | SYSTOLIC BLOOD PRESSURE: 175 MMHG

## 2021-06-01 VITALS — DIASTOLIC BLOOD PRESSURE: 77 MMHG | SYSTOLIC BLOOD PRESSURE: 157 MMHG

## 2021-06-01 VITALS
WEIGHT: 167 LBS | SYSTOLIC BLOOD PRESSURE: 173 MMHG | OXYGEN SATURATION: 97 % | BODY MASS INDEX: 25.31 KG/M2 | HEART RATE: 70 BPM | DIASTOLIC BLOOD PRESSURE: 74 MMHG | HEIGHT: 68 IN

## 2021-06-01 PROCEDURE — 93000 ELECTROCARDIOGRAM COMPLETE: CPT

## 2021-06-01 PROCEDURE — 99214 OFFICE O/P EST MOD 30 MIN: CPT | Mod: 25

## 2021-06-01 NOTE — HISTORY OF PRESENT ILLNESS
[FreeTextEntry1] : Mr. Mays presents for follow up and management of HTN, dyslipidemia, CAD s/p CABG x 5 (04/06/15), chronic systolic heart failure, s/p PPM '16 upgraded to CRT-P 11/2016, aortic stenosis s/p TAVR 04/02/19, and YORDY s/p left CEA. . He underwent 5-vessel coronary artery bypass grafting on 04/06/15. He had an echocardiogram on 05/12/16 which revealed an EF of 36%, akinesis of the apical anterior, apical inferior, and apical walls, moderate AS, NATE 1.2cm2, moderate TR, mild MR, and mild LVH. His pacemaker was upgraded to a CRT-P on 08/03/16. He had bleeding into the pocket and had pocket revision on 09/28/16. He was evaluated by Antonio Bustillo MD (general surgeon) at [facility] to address wound healing over the device. In 2017, in the office I examined the device wound and noted the wound edges not to be opposed and the device to be partially exposed. He went on to have the device extracted and replaced (11/2016) (Osito Alicea MD). He had an MPI stress test on 10/04/17 which revealed an EF of 47%, normal perfusion.  He underwent successful TAVR 26mm Jonas on 04/02/19  (John Mccormick MD).  The procedure was complicated by urinary retention which has since resolved.  On 03/19/19 he had a CTCA which revealed: moderately calcified aortic valve, severe TVD, LIMA-LAD patent, SVG-D1 patent, RA-RPL patent, SVG-OM patent, and SVG-RPDA patent.  He had an echocardiogram on 08/20/20 which revealed an EF of 49%, hypokinesis basal and mid inferior and basal inferolateral walls, grade I diastolic dysfunction, 26mm Jonas TAVR, mild-moderate paravalvular AI, and PPM.   He had a regadenoson MPI stress test on 11/04/20 which revealed an EF of 59% and normal perfusion.  His home BP device correlates well with the in-office device.   At present, he has complaints of low back pain, knee pain, carpal tunnel pain, and unintentional weight loss. \par \par

## 2021-06-01 NOTE — REASON FOR VISIT
[FreeTextEntry1] : Diagnostic Tests:\par -----------------------------------\par ECG:\par 06/01/21: A-paced, Bi-V paced. \par 02/13/20: A-paced, V-paced. \par 04/29/19: A-sensed, V-paced at 70 bpm.\par 02/07/19: A-sensed, V-paced at 70 bpm. \par 01/09/18: A-sensed, V-paced at 70 bpm. \par 05/12/16: A-sensed, V-paced at 62 BPM. \par 03/24/15: A-paced, V-paced at 64bpm\par 08/28/13: A-paced, V-sensed, RBBB, LAFB, prolonged A paced to V-sensed delay (288ms)\par -----------------------------------\par CTCA:\par 03/19/19: moderately calcified aortic valve, severe TVD, LIMA-LAD patent, SVG-D1 patent, RA-RPL patent, SVG-OM patent, SVG-RPDA patent. \par 02/24/15: Ca+ score 1027.41 (75th percentile), LM distal 50%, LAD prox 75%, LCX moderate, RCA moderate, PDA 50%, EF 56%, no RWMA\par ----------------------------\par Stress:\par 11/04/20: regadenoson MPI: EF 59%, normal perfusion. \par 10/04/17: regadenoson MPI: EF 47%, normal perfusion.\par 03/03/15: exercise MPI: 7 METS, EF 54%, fixed inferior and inferoseptal defects\par 02/29/12: exercise MPI: 7 METS, mild inferior and inferoseptal scar, no ischemia, EF 54%, no RWMA\par 03/02/09: exercise MPI: 7 METS, mild inferior and inferoseptal scar, no ischemia, EF 54%, no RWMA\par ----------------------------\par Echo:\par 08/20/20: EF 49%, hypokinesis basal and mid inferior and basal inferolateral walls, grade I diastolic dysfunction, 26mm Jonas TAVR, mild-moderate paravalvular AI, PPM.\par 04/29/19: EF 55%, LVH, dilated LA, s/p TAVR normally functioning, mild TR. \par 04/03/19: EF 54%, LVH, akinesis basal and mid IL, PPM, s/p 26mm TAVR (PV 2.1m/s, MG 11.8mmHg, LVOT/AV 0.33, NATE 2.4cm2). \par 02/12/19: E 44%, akinesis basal/mid IL, LVH, dilated LA/RA, normal RV function, severe AS (NATE 0.75cm2, PV 3.6m/s, MG 31mmHg, LVOT/AV 0.25, SVI 22ml/m2), PPM, atrial septal aneurysm.\par 10/05/17: EF 38%, grade I diastolic dysfunction, reduced RV function, akinesis apical septum and basal/mid inferolateral walls, severe AS, NATE 0.8cm2, LVOT/AV 0.23, mean gradient 23mmHg. \par 05/12/16: EF 36%, akinesis apical anterior, apical inferior and apical walls, moderate AS, NATE 1.2cm2, moderate TR, mild MR, LVH\par 04/02/15: EF 41%, mid LAD MI, mild AS, NATE 1.7cm2, grade I diastolic dysfunction, dilated LA, mild TR, PASP 36mmHg\par 02/28/12: EF 55%, mild TR, diastolic dysfunction\par 02/14/09: EF 55%, aortic sclerosis, diastolic dysfunction\par -----------------------------\par Tilt table test:\par 08/14/13: + orthostatic induced vasovagal symptoms\par ----------------------------\par Carotid:\par 10/14/20: sono: EMI 40-59% stenosis, LICA patent CEA. \par 11/13/19: sono: EMI 40-59% stenosis, LICA patent CEA. \par 03/19/19: sono: 20-39% b/l ICA.\par 11/15/17: sono: EMI 40-59%, s/p left CEA patent\par 04/03/15: sono: s/p left CEA, LICA 20-49%, EMI 50-69%\par ----------------------------\par Cath:\par 04/02/19: successful 26mm Jonas III TAVR\par 03/26/19: RA mid 50%, distal 80%, RDPA competitive flow from graft, LM 40%, LAD prox 100%, Lcx prox 50%, OM1 fills via graft, OM2 60%, LIMA-LAD patent, radial-LPL patent, SVG-diagonal patent, SVG-OM patent, SVG-RPDA patent, AV MG 41mmHg. \par 03/16/15: EF 50%, LAD MI, LM distal 40%, LAD prox 95%, mid 80%, D3 50%, LCX prox 50%, distal 90%, LPL ostium 80%, RCA mid 40%, pDA 100% with collaterals from LAD\par ----------------------------\par Aorta-Iliac:\par 05/07/21: sono: mid aorta aneurysm 3.99cm x 3.97cm, RCIA aneurysm 2.67cm, LICA 1.83cm. \par 10/14/20: sono: mid aorta aneurysm 3.89cm x 3.85cm, RCIA aneurysm 2.58cm x 2.47cm. \par 11/13/19: sono: mid aorta aneurysm 3.98cm x 3.88cm, RCIA aneurysm 2.54cm x 2.47cm. \par 11/15/17: sono: AAA mid 3.6cm x 3.2cm, RCIA 2.5cm x 2.3cm. LCIA 1.9cm x 1.7cm.\par ----------------------------\par Upper extremity veins:\par 11/17/16: sono: right: no RUE DVT.

## 2021-06-01 NOTE — ASSESSMENT
[FreeTextEntry1] : 1. CAD: s/p CABG x 5 (04/06/15) (Malachi Jose MD), s/p cath 03/26/19: RA mid 50%, distal 80%, RDPA competitive flow from graft, LM 40%, LAD prox 100%, Lcx prox 50%, OM1 fills via graft, OM2 60%, LIMA-LAD patent, radial-LPL patent, SVG-diagonal patent, SVG-OM patent, SVG-RPDA patent, AV MG 41mmHg, s/p CTCA 03/19/19: moderately calcified aortic valve, severe TVD, LIMA-LAD patent, SVG-D1 patent, RA-RPL patent, SVG-OM patent, SVG-RPDA patent,\par             - continue aspirin 81mg po daily\par             - will pursue aggressive medical management\par \par 2. Chronic diastolic heart failure: EF improved: s/p echo: 08/20/20: EF 49%, hypokinesis basal and mid inferior and basal inferolateral walls, grade I diastolic dysfunction, 26mm Jonas TAVR, mild-moderate paravalvular AI, PPM.\par             - continue prn loop diuretics, furosemide 80mg po daily prn \par             - discussed with patient importance of maintaining a weight log, avoidance of dietary sodium, as well as appropriate use of loop diuretics\par             - will follow with serial echocardiograms (ordered today)\par \par 3. Carotid stenosis: s/p left CEA 12/14/04 (Kareem Swain MD), 10/14/20: sono: EMI 40-59%, left CEA patent\par             - will monitor with periodic carotid sonograms (performed at the vascular surgeon's office)\par             - continue statin and anti-platelet agent\par             - discussed with patient surveillance for neurologic symptoms. \par \par 4. Aortic stenosis: s/p successful 26mm Jonas III TAVR 04/09/19 (John Mccormick MD): s/p eCHO: 08/20/20: EF 49%, hypokinesis basal and mid inferior and basal inferolateral walls, grade I diastolic dysfunction, 26mm Jonas TAVR, mild-moderate paravalvular AI, PPM.\par             - will follow with serial echocardiograms (ordered today)\par  \par 5. Aortic aneurysm, abdominal: s/p 05/07/21: sono: mid aorta aneurysm 3.99cm x 3.97cm, RCIA aneurysm 2.67cm, LICA 1.83cm. \par             - follow up with vascular surgeon, Kareem Swain MD. \par             - continue HTN management\par \par 6. HTN: BP not at ACC/AHA 2017 guideline target, ? occasional low reading on ambulatory log, ? symptomatic\par             - continue labetalol 300mg po bid\par             - increase valsartan from 160mg po QD to 320mg po qd \par             - if BP remains above target next visit will up titrate anti-HTN regimen\par \par 7. BPH (benign prostatic hyperplasia): urinary retention post TAVR\par             - continue Finasteride Tablet, 5 MG, 1 tablet, Orally, Once a day\par             - continue Tamsulosin HCl Capsule, 0.4 MG, 1 capsule 30 minutes after the same meal each day, Orally, Once a day\par             - f/u with urologist \par \par 8. Presence of cardiac pacemaker: s/p CRT-P, upgraded from PPM 09/2016 complicated by hematoma requiring device extraction and replacement on the right side (11/2016).\par             - continue follow up with device clinic \par \par 9. Dyslipidemia: lipids at goal\par            - continue atorvastatin 80mg po daily\par            - patient will provide copy of recent lab work from PMD's office for my review\par \par 10. Consider r/o cardiac amyloidosis: + aortic stenosis, + carpal tunnel, + w/u for multiple myeloma\par            - will review results of recent PMD lab work to screen for multiple myleoma\par            - will review echo (with strain)\par \par An ECG was obtained to evaluate heart rhythm and screen for any underlying cardiac abnormalities. \par \par

## 2021-07-19 ENCOUNTER — APPOINTMENT (OUTPATIENT)
Dept: HEART AND VASCULAR | Facility: CLINIC | Age: 86
End: 2021-07-19
Payer: MEDICARE

## 2021-07-19 ENCOUNTER — NON-APPOINTMENT (OUTPATIENT)
Age: 86
End: 2021-07-19

## 2021-07-19 PROCEDURE — 93294 REM INTERROG EVL PM/LDLS PM: CPT

## 2021-07-19 PROCEDURE — 93296 REM INTERROG EVL PM/IDS: CPT

## 2021-09-07 ENCOUNTER — APPOINTMENT (OUTPATIENT)
Dept: HEART AND VASCULAR | Facility: CLINIC | Age: 86
End: 2021-09-07
Payer: MEDICARE

## 2021-09-07 VITALS
WEIGHT: 162.38 LBS | TEMPERATURE: 98.1 F | SYSTOLIC BLOOD PRESSURE: 163 MMHG | OXYGEN SATURATION: 96 % | HEIGHT: 68 IN | HEART RATE: 68 BPM | BODY MASS INDEX: 24.61 KG/M2 | DIASTOLIC BLOOD PRESSURE: 69 MMHG

## 2021-09-07 VITALS — DIASTOLIC BLOOD PRESSURE: 83 MMHG | SYSTOLIC BLOOD PRESSURE: 163 MMHG

## 2021-09-07 PROCEDURE — 99214 OFFICE O/P EST MOD 30 MIN: CPT

## 2021-09-07 NOTE — REASON FOR VISIT
[Other: ____] : [unfilled] [FreeTextEntry1] : Diagnostic Tests:\par -----------------------------------\par ECG:\par 06/01/21: A-paced, Bi-V paced. \par 02/13/20: A-paced, V-paced. \par 04/29/19: A-sensed, V-paced at 70 bpm.\par 02/07/19: A-sensed, V-paced at 70 bpm. \par 01/09/18: A-sensed, V-paced at 70 bpm. \par 05/12/16: A-sensed, V-paced at 62 BPM. \par 03/24/15: A-paced, V-paced at 64bpm\par 08/28/13: A-paced, V-sensed, RBBB, LAFB, prolonged A paced to V-sensed delay (288ms)\par -----------------------------------\par CTCA:\par 03/19/19: moderately calcified aortic valve, severe TVD, LIMA-LAD patent, SVG-D1 patent, RA-RPL patent, SVG-OM patent, SVG-RPDA patent. \par 02/24/15: Ca+ score 1027.41 (75th percentile), LM distal 50%, LAD prox 75%, LCX moderate, RCA moderate, PDA 50%, EF 56%, no RWMA\par ----------------------------\par Stress:\par 11/04/20: regadenoson MPI: EF 59%, normal perfusion. \par 10/04/17: regadenoson MPI: EF 47%, normal perfusion.\par 03/03/15: exercise MPI: 7 METS, EF 54%, fixed inferior and inferoseptal defects\par 02/29/12: exercise MPI: 7 METS, mild inferior and inferoseptal scar, no ischemia, EF 54%, no RWMA\par 03/02/09: exercise MPI: 7 METS, mild inferior and inferoseptal scar, no ischemia, EF 54%, no RWMA\par ----------------------------\par Echo:\par 08/20/20: EF 49%, hypokinesis basal and mid inferior and basal inferolateral walls, grade I diastolic dysfunction, 26mm Jonas TAVR, mild-moderate paravalvular AI, PPM.\par 04/29/19: EF 55%, LVH, dilated LA, s/p TAVR normally functioning, mild TR. \par 04/03/19: EF 54%, LVH, akinesis basal and mid IL, PPM, s/p 26mm TAVR (PV 2.1m/s, MG 11.8mmHg, LVOT/AV 0.33, NATE 2.4cm2). \par 02/12/19: E 44%, akinesis basal/mid IL, LVH, dilated LA/RA, normal RV function, severe AS (NATE 0.75cm2, PV 3.6m/s, MG 31mmHg, LVOT/AV 0.25, SVI 22ml/m2), PPM, atrial septal aneurysm.\par 10/05/17: EF 38%, grade I diastolic dysfunction, reduced RV function, akinesis apical septum and basal/mid inferolateral walls, severe AS, NATE 0.8cm2, LVOT/AV 0.23, mean gradient 23mmHg. \par 05/12/16: EF 36%, akinesis apical anterior, apical inferior and apical walls, moderate AS, NATE 1.2cm2, moderate TR, mild MR, LVH\par 04/02/15: EF 41%, mid LAD MI, mild AS, NATE 1.7cm2, grade I diastolic dysfunction, dilated LA, mild TR, PASP 36mmHg\par 02/28/12: EF 55%, mild TR, diastolic dysfunction\par 02/14/09: EF 55%, aortic sclerosis, diastolic dysfunction\par -----------------------------\par Tilt table test:\par 08/14/13: + orthostatic induced vasovagal symptoms\par ----------------------------\par Carotid:\par 10/14/20: sono: EMI 40-59% stenosis, LICA patent CEA. \par 11/13/19: sono: EMI 40-59% stenosis, LICA patent CEA. \par 03/19/19: sono: 20-39% b/l ICA.\par 11/15/17: sono: EMI 40-59%, s/p left CEA patent\par 04/03/15: sono: s/p left CEA, LICA 20-49%, EMI 50-69%\par ----------------------------\par Cath:\par 04/02/19: successful 26mm Jonas III TAVR\par 03/26/19: RA mid 50%, distal 80%, RDPA competitive flow from graft, LM 40%, LAD prox 100%, Lcx prox 50%, OM1 fills via graft, OM2 60%, LIMA-LAD patent, radial-LPL patent, SVG-diagonal patent, SVG-OM patent, SVG-RPDA patent, AV MG 41mmHg. \par 03/16/15: EF 50%, LAD MI, LM distal 40%, LAD prox 95%, mid 80%, D3 50%, LCX prox 50%, distal 90%, LPL ostium 80%, RCA mid 40%, pDA 100% with collaterals from LAD\par ----------------------------\par Aorta-Iliac:\par 05/07/21: sono: mid aorta aneurysm 3.99cm x 3.97cm, RCIA aneurysm 2.67cm, LICA 1.83cm. \par 10/14/20: sono: mid aorta aneurysm 3.89cm x 3.85cm, RCIA aneurysm 2.58cm x 2.47cm. \par 11/13/19: sono: mid aorta aneurysm 3.98cm x 3.88cm, RCIA aneurysm 2.54cm x 2.47cm. \par 11/15/17: sono: AAA mid 3.6cm x 3.2cm, RCIA 2.5cm x 2.3cm. LCIA 1.9cm x 1.7cm.\par ----------------------------\par Upper extremity veins:\par 11/17/16: sono: right: no RUE DVT.

## 2021-09-07 NOTE — ASSESSMENT
[FreeTextEntry1] : 1. CAD: s/p CABG x 5 (04/06/15) (Malachi Jose MD), s/p cath 03/26/19: RA mid 50%, distal 80%, RDPA competitive flow from graft, LM 40%, LAD prox 100%, Lcx prox 50%, OM1 fills via graft, OM2 60%, LIMA-LAD patent, radial-LPL patent, SVG-diagonal patent, SVG-OM patent, SVG-RPDA patent, AV MG 41mmHg, s/p CTCA 03/19/19: moderately calcified aortic valve, severe TVD, LIMA-LAD patent, SVG-D1 patent, RA-RPL patent, SVG-OM patent, SVG-RPDA patent,\par             - continue aspirin 81mg po daily (will hold while taking meloxicam) \par             - will pursue aggressive medical management\par \par 2. Chronic diastolic heart failure: EF improved: s/p echo: 08/20/20: EF 49%, hypokinesis basal and mid inferior and basal inferolateral walls, grade I diastolic dysfunction, 26mm Jonas TAVR, mild-moderate paravalvular AI, PPM.\par             - continue prn loop diuretics, furosemide 80mg po daily prn \par             - discussed with patient importance of maintaining a weight log, avoidance of dietary sodium, as well as appropriate use of loop diuretics\par             - will follow with serial echocardiograms (ordered today)\par \par 3. Carotid stenosis: s/p left CEA 12/14/04 (Kareem Swain MD), 10/14/20: sono: EMI 40-59%, left CEA patent\par             - will monitor with periodic carotid sonograms (performed at the vascular surgeon's office)\par             - continue statin and anti-platelet agent\par             - discussed with patient surveillance for neurologic symptoms. \par \par 4. Aortic stenosis: s/p successful 26mm Jonas III TAVR 04/09/19 (John Mccormick MD): s/p eCHO: 08/20/20: EF 49%, hypokinesis basal and mid inferior and basal inferolateral walls, grade I diastolic dysfunction, 26mm Jonas TAVR, mild-moderate paravalvular AI, PPM.\par             - will follow with serial echocardiograms (ordered today)\par  \par 5. Aortic aneurysm, abdominal: s/p 05/07/21: sono: mid aorta aneurysm 3.99cm x 3.97cm, RCIA aneurysm 2.67cm, LICA 1.83cm. \par             - follow up with vascular surgeon, Kareem Swain MD. \par             - continue HTN management\par \par 6. HTN: BP not at ACC/AHA 2017 guideline target, ? occasional low reading on ambulatory log, ? symptomatic\par             - continue labetalol 300mg po bid\par             - increase valsartan from 160mg po QD to 320mg po qd \par             - if BP remains above target next visit will up titrate anti-HTN regimen\par \par 7. BPH (benign prostatic hyperplasia): urinary retention post TAVR\par             - continue Finasteride Tablet, 5 MG, 1 tablet, Orally, Once a day\par             - continue Tamsulosin HCl Capsule, 0.4 MG, 1 capsule 30 minutes after the same meal each day, Orally, Once a day\par             - f/u with urologist \par \par 8. Presence of cardiac pacemaker: s/p CRT-P, upgraded from PPM 09/2016 complicated by hematoma requiring device extraction and replacement on the right side (11/2016).\par             - continue follow up with device clinic \par \par 9. Dyslipidemia: lipids at goal\par            - continue atorvastatin 80mg po daily\par            - patient will provide copy of recent lab work from PMD's office for my review\par \par 10. Consider r/o cardiac amyloidosis: + aortic stenosis, + carpal tunnel, + w/u for multiple myeloma\par            - will review results of recent PMD lab work to screen for multiple myeloma\par            - will review echo (with strain)\par \par \par

## 2021-09-07 NOTE — HISTORY OF PRESENT ILLNESS
[FreeTextEntry1] : Mr. Mays presents for follow up and management of HTN, dyslipidemia, CAD s/p CABG x 5 (04/06/15), chronic systolic heart failure, s/p PPM '16 upgraded to CRT-P 11/2016, aortic stenosis s/p TAVR 04/02/19, and YORDY s/p left CEA. . He underwent 5-vessel coronary artery bypass grafting on 04/06/15. He had an echocardiogram on 05/12/16 which revealed an EF of 36%, akinesis of the apical anterior, apical inferior, and apical walls, moderate AS, NATE 1.2cm2, moderate TR, mild MR, and mild LVH. His pacemaker was upgraded to a CRT-P on 08/03/16. He had bleeding into the pocket and had pocket revision on 09/28/16. He was evaluated by Antonio Bustillo MD (general surgeon) at [facility] to address wound healing over the device. In 2017, in the office I examined the device wound and noted the wound edges not to be opposed and the device to be partially exposed. He went on to have the device extracted and replaced (11/2016) (Osito Alicea MD). He had an MPI stress test on 10/04/17 which revealed an EF of 47%, normal perfusion.  He underwent successful TAVR 26mm Jonas on 04/02/19  (John Mccormick MD).  The procedure was complicated by urinary retention which has since resolved.  On 03/19/19 he had a CTCA which revealed: moderately calcified aortic valve, severe TVD, LIMA-LAD patent, SVG-D1 patent, RA-RPL patent, SVG-OM patent, and SVG-RPDA patent.  He had an echocardiogram on 08/20/20 which revealed an EF of 49%, hypokinesis basal and mid inferior and basal inferolateral walls, grade I diastolic dysfunction, 26mm Jonas TAVR, mild-moderate paravalvular AI, and PPM.   He had a regadenoson MPI stress test on 11/04/20 which revealed an EF of 59% and normal perfusion.  His home BP device correlates well with the in-office device.  He has a minor mechanical fall 2 weeks ago and sustained abrasions to his left forearm.  He has complaints of left sided inguinal hernia.  He is taking meloxicam daily for severe knee arthritis.  We discussed the bleed and HTN risk of daily NSAID use.  \par \par

## 2021-09-07 NOTE — PHYSICAL EXAM
[Normal Conjunctiva] : the conjunctiva exhibited no abnormalities [Eyelids - No Xanthelasma] : the eyelids demonstrated no xanthelasmas [Normal Oral Mucosa] : normal oral mucosa [No Oral Pallor] : no oral pallor [No Oral Cyanosis] : no oral cyanosis [Normal Jugular Venous A Waves Present] : normal jugular venous A waves present [Normal Jugular Venous V Waves Present] : normal jugular venous V waves present [No Jugular Venous Bella A Waves] : no jugular venous bella A waves [Heart Rate And Rhythm] : heart rate and rhythm were normal [Heart Sounds] : normal S1 and S2 [Murmurs] : no murmurs present [Bowel Sounds] : normal bowel sounds [Abdomen Soft] : soft [Abdomen Tenderness] : non-tender [Abdomen Mass (___ Cm)] : no abdominal mass palpated [Abnormal Walk] : normal gait [Gait - Sufficient For Exercise Testing] : the gait was sufficient for exercise testing [Nail Clubbing] : no clubbing of the fingernails [Cyanosis, Localized] : no localized cyanosis [Petechial Hemorrhages (___cm)] : no petechial hemorrhages [Skin Color & Pigmentation] : normal skin color and pigmentation [No Venous Stasis] : no venous stasis [Skin Lesions] : no skin lesions [No Skin Ulcers] : no skin ulcer [No Xanthoma] : no  xanthoma was observed [Oriented To Time, Place, And Person] : oriented to person, place, and time [Affect] : the affect was normal [Mood] : the mood was normal [No Anxiety] : not feeling anxious [General Appearance - Well Developed] : well developed [Normal Appearance] : normal appearance [Well Groomed] : well groomed [General Appearance - Well Nourished] : well nourished [No Deformities] : no deformities [General Appearance - In No Acute Distress] : no acute distress [] : no respiratory distress [Respiration, Rhythm And Depth] : normal respiratory rhythm and effort [Exaggerated Use Of Accessory Muscles For Inspiration] : no accessory muscle use [Auscultation Breath Sounds / Voice Sounds] : lungs were clear to auscultation bilaterally [FreeTextEntry1] : + multiple ecchymosis

## 2021-10-20 ENCOUNTER — NON-APPOINTMENT (OUTPATIENT)
Age: 86
End: 2021-10-20

## 2021-10-20 ENCOUNTER — APPOINTMENT (OUTPATIENT)
Dept: HEART AND VASCULAR | Facility: CLINIC | Age: 86
End: 2021-10-20
Payer: MEDICARE

## 2021-10-20 PROCEDURE — 93294 REM INTERROG EVL PM/LDLS PM: CPT

## 2021-10-20 PROCEDURE — 93296 REM INTERROG EVL PM/IDS: CPT | Mod: NC

## 2021-12-07 ENCOUNTER — APPOINTMENT (OUTPATIENT)
Dept: HEART AND VASCULAR | Facility: CLINIC | Age: 86
End: 2021-12-07
Payer: MEDICARE

## 2021-12-07 ENCOUNTER — NON-APPOINTMENT (OUTPATIENT)
Age: 86
End: 2021-12-07

## 2021-12-07 VITALS
SYSTOLIC BLOOD PRESSURE: 169 MMHG | DIASTOLIC BLOOD PRESSURE: 77 MMHG | TEMPERATURE: 97.3 F | WEIGHT: 152.25 LBS | OXYGEN SATURATION: 98 % | HEIGHT: 68 IN | HEART RATE: 70 BPM | BODY MASS INDEX: 23.07 KG/M2

## 2021-12-07 VITALS — SYSTOLIC BLOOD PRESSURE: 168 MMHG | DIASTOLIC BLOOD PRESSURE: 82 MMHG

## 2021-12-07 PROCEDURE — 93000 ELECTROCARDIOGRAM COMPLETE: CPT

## 2021-12-07 PROCEDURE — 93306 TTE W/DOPPLER COMPLETE: CPT | Mod: 59

## 2021-12-07 PROCEDURE — 99214 OFFICE O/P EST MOD 30 MIN: CPT | Mod: 25

## 2021-12-07 RX ORDER — VALSARTAN 320 MG/1
320 TABLET, COATED ORAL DAILY
Qty: 90 | Refills: 3 | Status: ACTIVE | COMMUNITY
Start: 2019-12-12 | End: 1900-01-01

## 2021-12-07 RX ORDER — FUROSEMIDE 80 MG/1
80 TABLET ORAL DAILY
Qty: 90 | Refills: 3 | Status: ACTIVE | COMMUNITY
Start: 1900-01-01 | End: 1900-01-01

## 2021-12-07 NOTE — ASSESSMENT
[FreeTextEntry1] : 1. CAD: s/p CABG x 5 (04/06/15) (Malachi Jose MD), s/p cath 03/26/19: RA mid 50%, distal 80%, RDPA competitive flow from graft, LM 40%, LAD prox 100%, Lcx prox 50%, OM1 fills via graft, OM2 60%, LIMA-LAD patent, radial-LPL patent, SVG-diagonal patent, SVG-OM patent, SVG-RPDA patent, AV MG 41mmHg, s/p CTCA 03/19/19: moderately calcified aortic valve, severe TVD, LIMA-LAD patent, SVG-D1 patent, RA-RPL patent, SVG-OM patent, SVG-RPDA patent,\par             - continue aspirin 81mg po daily (will hold while taking meloxicam) \par             - will pursue aggressive medical management\par \par 2. Chronic diastolic heart failure: EF improved: s/p echo: 12/07/21: EF 52%, hypokinesis basal and mid inferior and basal inferolateral walls, grade I diastolic dysfunction, 26mm Jonas TAVR, mild-moderate paravalvular AI, PPM, LV GLS -14.5%, atrial septal aneurysm, severely dilated LA, dilated RA.  \par             - continue prn loop diuretics, furosemide 80mg po daily prn \par             - discussed with patient importance of maintaining a weight log, avoidance of dietary sodium, as well as appropriate use of loop diuretics\par             - will follow with serial echocardiograms \par \par 3. Carotid stenosis: s/p left CEA 12/14/04 (Kareem Swain MD), 11/17/21: sono: EMI 40-59%, left CEA patent\par             - will monitor with periodic carotid sonograms (performed at the vascular surgeon's office)\par             - continue statin and anti-platelet agent\par             - discussed with patient surveillance for neurologic symptoms. \par \par 4. Aortic stenosis: s/p successful 26mm Jonas III TAVR 04/09/19 (John Mccormick MD): s/p echo: 12/07/21: EF 52%, hypokinesis basal and mid inferior and basal inferolateral walls, grade I diastolic dysfunction, 26mm Jonas TAVR, mild-moderate paravalvular AI, PPM, LV GLS -14.5%, atrial septal aneurysm, severely dilated LA, dilated RA.  \par             - will follow with serial echocardiograms \par  \par 5. Aortic aneurysm, abdominal: s/p 11/17/21: sono: mid aorta aneurysm 4.26cm x 4.25cm, EMI aneurysm 2.83cm, LICA 1.83cm. \par             - follow up with vascular surgeon, Kareem Swain MD. \par             - continue HTN management\par \par 6. HTN: BP not at ACC/AHA 2017 guideline target, ? occasional low reading on ambulatory log, ? symptomatic\par             - continue labetalol 300mg po bid\par             - increase valsartan from 160mg po qd to 320mg po qd \par             - if BP remains above target next visit will up titrate anti-HTN regimen\par \par 7. BPH (benign prostatic hyperplasia): urinary retention post TAVR\par             - continue Finasteride Tablet, 5 MG, 1 tablet, Orally, Once a day\par             - continue Tamsulosin HCl Capsule, 0.4 MG, 1 capsule 30 minutes after the same meal each day, Orally, Once a day\par             - f/u with urologist \par \par 8. Presence of cardiac pacemaker: s/p CRT-P, upgraded from PPM 09/2016 complicated by hematoma requiring device extraction and replacement on the right side (11/2016).\par             - continue follow up with device clinic \par \par 9. Dyslipidemia: lipids at goal\par            - continue atorvastatin 80mg po daily\par            - patient will provide copy of recent lab work from PMD's office for my review\par \par An ECG was obtained to evaluate heart rhythm and screen for any underlying cardiac abnormalities. \par \par \par

## 2021-12-07 NOTE — REVIEW OF SYSTEMS
[Joint Pain] : joint pain [Negative] : Heme/Lymph [Dyspnea on exertion] : dyspnea during exertion [Lower Ext Edema] : lower extremity edema [Weakness] : weakness

## 2021-12-07 NOTE — REASON FOR VISIT
[Other: ____] : [unfilled] [FreeTextEntry1] : Diagnostic Tests:\par -----------------------------------\par ECG:\par 12/07/21: A-paced, Bi-V paced.  \par 06/01/21: A-paced, Bi-V paced. \par 02/13/20: A-paced, V-paced. \par 04/29/19: A-sensed, V-paced at 70 bpm.\par 02/07/19: A-sensed, V-paced at 70 bpm. \par 01/09/18: A-sensed, V-paced at 70 bpm. \par 05/12/16: A-sensed, V-paced at 62 BPM. \par 03/24/15: A-paced, V-paced at 64bpm\par 08/28/13: A-paced, V-sensed, RBBB, LAFB, prolonged A paced to V-sensed delay (288ms)\par -----------------------------------\par CTCA:\par 03/19/19: moderately calcified aortic valve, severe TVD, LIMA-LAD patent, SVG-D1 patent, RA-RPL patent, SVG-OM patent, SVG-RPDA patent. \par 02/24/15: Ca+ score 1027.41 (75th percentile), LM distal 50%, LAD prox 75%, LCX moderate, RCA moderate, PDA 50%, EF 56%, no RWMA\par ----------------------------\par Stress:\par 11/04/20: regadenoson MPI: EF 59%, normal perfusion. \par 10/04/17: regadenoson MPI: EF 47%, normal perfusion.\par 03/03/15: exercise MPI: 7 METS, EF 54%, fixed inferior and inferoseptal defects\par 02/29/12: exercise MPI: 7 METS, mild inferior and inferoseptal scar, no ischemia, EF 54%, no RWMA\par 03/02/09: exercise MPI: 7 METS, mild inferior and inferoseptal scar, no ischemia, EF 54%, no RWMA\par ----------------------------\par Echo:\par 12/07/21: EF 52%, hypokinesis basal and mid inferior and basal inferolateral walls, grade I diastolic dysfunction, 26mm Jonas TAVR, mild-moderate paravalvular AI, PPM, LV GLS -14.5%, atrial septal aneurysm, severely dilated LA, dilated RA.  \par 08/20/20: EF 49%, hypokinesis basal and mid inferior and basal inferolateral walls, grade I diastolic dysfunction, 26mm Jonas TAVR, mild-moderate paravalvular AI, PPM.\par 04/29/19: EF 55%, LVH, dilated LA, s/p TAVR normally functioning, mild TR. \par 04/03/19: EF 54%, LVH, akinesis basal and mid IL, PPM, s/p 26mm TAVR (PV 2.1m/s, MG 11.8mmHg, LVOT/AV 0.33, NATE 2.4cm2). \par 02/12/19: E 44%, akinesis basal/mid IL, LVH, dilated LA/RA, normal RV function, severe AS (NATE 0.75cm2, PV 3.6m/s, MG 31mmHg, LVOT/AV 0.25, SVI 22ml/m2), PPM, atrial septal aneurysm.\par 10/05/17: EF 38%, grade I diastolic dysfunction, reduced RV function, akinesis apical septum and basal/mid inferolateral walls, severe AS, NATE 0.8cm2, LVOT/AV 0.23, mean gradient 23mmHg. \par 05/12/16: EF 36%, akinesis apical anterior, apical inferior and apical walls, moderate AS, NATE 1.2cm2, moderate TR, mild MR, LVH\par 04/02/15: EF 41%, mid LAD MI, mild AS, NATE 1.7cm2, grade I diastolic dysfunction, dilated LA, mild TR, PASP 36mmHg\par 02/28/12: EF 55%, mild TR, diastolic dysfunction\par 02/14/09: EF 55%, aortic sclerosis, diastolic dysfunction\par -----------------------------\par Tilt table test:\par 08/14/13: + orthostatic induced vasovagal symptoms\par ----------------------------\par Carotid:\par 11/17/21: sono: EMI 40-59% stenosis, LICA patent CEA. \par 10/14/20: sono: EMI 40-59% stenosis, LICA patent CEA. \par 11/13/19: sono: EMI 40-59% stenosis, LICA patent CEA. \par 03/19/19: sono: 20-39% b/l ICA.\par 11/15/17: sono: EMI 40-59%, s/p left CEA patent\par 04/03/15: sono: s/p left CEA, LICA 20-49%, EMI 50-69%\par ----------------------------\par Cath:\par 04/02/19: successful 26mm Jonas III TAVR\par 03/26/19: RA mid 50%, distal 80%, RDPA competitive flow from graft, LM 40%, LAD prox 100%, Lcx prox 50%, OM1 fills via graft, OM2 60%, LIMA-LAD patent, radial-LPL patent, SVG-diagonal patent, SVG-OM patent, SVG-RPDA patent, AV MG 41mmHg. \par 03/16/15: EF 50%, LAD MI, LM distal 40%, LAD prox 95%, mid 80%, D3 50%, LCX prox 50%, distal 90%, LPL ostium 80%, RCA mid 40%, pDA 100% with collaterals from LAD\par ----------------------------\par Aorta-Iliac:\par 11/17/21: sono: mid aorta aneurysm 4.26cm x 4.25cm, EMI aneurysm 2.83cm, LICA 1.83cm. \par 05/07/21: sono: mid aorta aneurysm 3.99cm x 3.97cm, RCIA aneurysm 2.67cm, LICA 1.83cm. \par 10/14/20: sono: mid aorta aneurysm 3.89cm x 3.85cm, RCIA aneurysm 2.58cm x 2.47cm. \par 11/13/19: sono: mid aorta aneurysm 3.98cm x 3.88cm, RCIA aneurysm 2.54cm x 2.47cm. \par 11/15/17: sono: AAA mid 3.6cm x 3.2cm, RCIA 2.5cm x 2.3cm. LCIA 1.9cm x 1.7cm.\par ----------------------------\par Upper extremity veins:\par 11/17/16: sono: right: no RUE DVT.

## 2021-12-07 NOTE — HISTORY OF PRESENT ILLNESS
[FreeTextEntry1] : Mr. Mays presents for follow up and management of HTN, dyslipidemia, CAD s/p CABG x 5 (04/06/15), chronic systolic heart failure, s/p PPM '16 upgraded to CRT-P 11/2016, aortic stenosis s/p TAVR 04/02/19, and YORDY s/p left CEA. . He underwent 5-vessel coronary artery bypass grafting on 04/06/15. He had an echocardiogram on 05/12/16 which revealed an EF of 36%, akinesis of the apical anterior, apical inferior, and apical walls, moderate AS, NATE 1.2cm2, moderate TR, mild MR, and mild LVH. His pacemaker was upgraded to a CRT-P on 08/03/16. He had bleeding into the pocket and had pocket revision on 09/28/16. He was evaluated by Antonio Bustillo MD (general surgeon) at [facility] to address wound healing over the device. In 2017, in the office I examined the device wound and noted the wound edges not to be opposed and the device to be partially exposed. He went on to have the device extracted and replaced (11/2016) (Osito Alicea MD). He had an MPI stress test on 10/04/17 which revealed an EF of 47%, normal perfusion.  He underwent successful TAVR 26mm Jonas on 04/02/19  (John Mccormick MD).  The procedure was complicated by urinary retention which has since resolved.  On 03/19/19 he had a CTCA which revealed: moderately calcified aortic valve, severe TVD, LIMA-LAD patent, SVG-D1 patent, RA-RPL patent, SVG-OM patent, and SVG-RPDA patent. He had a regadenoson MPI stress test on 11/04/20 which revealed an EF of 59% and normal perfusion.  Today, 12/07/21, he had an echocardiogram which revealed an EF of 52%, hypokinesis basal and mid inferior and basal inferolateral walls, grade I diastolic dysfunction, 26mm Jonas TAVR, mild-moderate paravalvular AI, PPM, LV GLS -14.5%, atrial septal aneurysm, severely dilated LA, and a dilated RA.  He continues to have lower extremity edema and gait imbalance. \par \par \par

## 2022-01-19 ENCOUNTER — NON-APPOINTMENT (OUTPATIENT)
Age: 87
End: 2022-01-19

## 2022-01-19 ENCOUNTER — APPOINTMENT (OUTPATIENT)
Dept: HEART AND VASCULAR | Facility: CLINIC | Age: 87
End: 2022-01-19
Payer: MEDICARE

## 2022-01-19 PROCEDURE — 93294 REM INTERROG EVL PM/LDLS PM: CPT

## 2022-01-19 PROCEDURE — 93296 REM INTERROG EVL PM/IDS: CPT

## 2022-03-07 NOTE — REVIEW OF SYSTEMS
[Dyspnea on exertion] : dyspnea during exertion [Lower Ext Edema] : lower extremity edema [Joint Pain] : joint pain [Weakness] : weakness [Negative] : Heme/Lymph

## 2022-03-10 ENCOUNTER — APPOINTMENT (OUTPATIENT)
Dept: HEART AND VASCULAR | Facility: CLINIC | Age: 87
End: 2022-03-10
Payer: MEDICARE

## 2022-03-10 VITALS
SYSTOLIC BLOOD PRESSURE: 121 MMHG | OXYGEN SATURATION: 99 % | DIASTOLIC BLOOD PRESSURE: 59 MMHG | WEIGHT: 156 LBS | BODY MASS INDEX: 23.64 KG/M2 | HEIGHT: 68 IN | HEART RATE: 70 BPM

## 2022-03-10 PROCEDURE — 99443: CPT | Mod: 95

## 2022-03-10 RX ORDER — ROSUVASTATIN CALCIUM 40 MG/1
40 TABLET, FILM COATED ORAL DAILY
Qty: 90 | Refills: 3 | Status: ACTIVE | COMMUNITY

## 2022-03-10 NOTE — ASSESSMENT
[FreeTextEntry1] : 1. CAD: s/p CABG x 5 (04/06/15) (Malachi Jose MD), s/p cath 03/26/19: RA mid 50%, distal 80%, RDPA competitive flow from graft, LM 40%, LAD prox 100%, Lcx prox 50%, OM1 fills via graft, OM2 60%, LIMA-LAD patent, radial-LPL patent, SVG-diagonal patent, SVG-OM patent, SVG-RPDA patent, AV MG 41mmHg, s/p CTCA 03/19/19: moderately calcified aortic valve, severe TVD, LIMA-LAD patent, SVG-D1 patent, RA-RPL patent, SVG-OM patent, SVG-RPDA patent,\par             - continue aspirin 81mg po daily (will hold while taking meloxicam) \par             - will pursue aggressive medical management\par \par 2. Chronic diastolic heart failure: EF improved: s/p echo: 12/07/21: EF 52%, hypokinesis basal and mid inferior and basal inferolateral walls, grade I diastolic dysfunction, 26mm Jonas TAVR, mild-moderate paravalvular AI, PPM, LV GLS -14.5%, atrial septal aneurysm, severely dilated LA, dilated RA.  \par             - continue prn loop diuretics, furosemide 80mg po daily prn \par             - discussed with patient importance of maintaining a weight log, avoidance of dietary sodium, as well as appropriate use of loop diuretics\par             - will follow with serial echocardiograms \par \par 3. Carotid stenosis: s/p left CEA 12/14/04 (Kareem Swain MD), 11/17/21: sono: EMI 40-59%, left CEA patent\par             - will monitor with periodic carotid sonograms (performed at the vascular surgeon's office)\par             - continue statin and anti-platelet agent\par             - discussed with patient surveillance for neurologic symptoms. \par \par 4. Aortic stenosis: s/p successful 26mm Jonas III TAVR 04/09/19 (John Mccormick MD): s/p echo: 12/07/21: EF 52%, hypokinesis basal and mid inferior and basal inferolateral walls, grade I diastolic dysfunction, 26mm Jonas TAVR, mild-moderate paravalvular AI, PPM, LV GLS -14.5%, atrial septal aneurysm, severely dilated LA, dilated RA.  \par             - will follow with serial echocardiograms \par  \par 5. Aortic aneurysm, abdominal: s/p 11/17/21: sono: mid aorta aneurysm 4.26cm x 4.25cm, EMI aneurysm 2.83cm, LICA 1.83cm. \par             - follow up with vascular surgeon, Kareem Swain MD. \par             - continue HTN management\par \par 6. HTN: BP at ACC/AHA 2017 guideline target, ? occasional low reading on ambulatory log, ? symptomatic\par             - continue labetalol 300mg po bid (he often hold the pm dose if his evening BP is well controlled)\par             - continue valsartan 320mg po qd \par \par 7. BPH (benign prostatic hyperplasia): urinary retention post TAVR\par             - continue finasteride 5mg po qd\par             - continue tamsulosin 0.4mg po qd \par             - f/u with urologist \par \par 8. Presence of cardiac pacemaker: s/p CRT-P, upgraded from PPM 09/2016 complicated by hematoma requiring device extraction and replacement on the right side (11/2016).\par             - continue follow up with device clinic (has not had an interrogation in some time)\par \par 9. Dyslipidemia: lipids at goal\par            - continue atorvastatin 80mg po daily\par            - patient will provide copy of recent lab work from PMD's office for my review\par \par \par This encounter was performed as a telehealth encounter employing the [employer], [employer], or other approved audio/video platform.  All components of the evaluation and management were performed per clinical routine with the exception of the physical exam.  The physical exam references my most recent physical exam plus any additional information provided by the patient (i.e. ambulatory vitals/weight) or inspection from the video portion of the encounter. \par \par I spent in excess of 40 minutes on the encounter.  \par \par Verbal consent was given on 03/10/22 by Markus Mays.  \par \par Patient location: The patient was located at the primary address listed in the medical record.\par Physician location: I was located in my office in [facility]. \par \par \par

## 2022-03-10 NOTE — HISTORY OF PRESENT ILLNESS
[FreeTextEntry1] : Mr. Mays presents for follow up and management of HTN, dyslipidemia, CAD s/p CABG x 5 (04/06/15), chronic systolic heart failure, s/p PPM '16 upgraded to CRT-P 11/2016, aortic stenosis s/p TAVR 04/02/19, and YORDY s/p left CEA. . He underwent 5-vessel coronary artery bypass grafting on 04/06/15. He had an echocardiogram on 05/12/16 which revealed an EF of 36%, akinesis of the apical anterior, apical inferior, and apical walls, moderate AS, NATE 1.2cm2, moderate TR, mild MR, and mild LVH. His pacemaker was upgraded to a CRT-P on 08/03/16. He had bleeding into the pocket and had pocket revision on 09/28/16. He was evaluated by Antonio Bustillo MD (general surgeon) at [facility] to address wound healing over the device. In 2017, in the office I examined the device wound and noted the wound edges not to be opposed and the device to be partially exposed. He went on to have the device extracted and replaced (11/2016) (Osito Alicea MD). He had an MPI stress test on 10/04/17 which revealed an EF of 47%, normal perfusion.  He underwent successful TAVR 26mm Jonas on 04/02/19  (John Mccormick MD).  The procedure was complicated by urinary retention which has since resolved.  On 03/19/19 he had a CTCA which revealed: moderately calcified aortic valve, severe TVD, LIMA-LAD patent, SVG-D1 patent, RA-RPL patent, SVG-OM patent, and SVG-RPDA patent. He had a regadenoson MPI stress test on 11/04/20 which revealed an EF of 59% and normal perfusion.  On, 12/07/21, he had an echocardiogram which revealed an EF of 52%, hypokinesis basal and mid inferior and basal inferolateral walls, grade I diastolic dysfunction, 26mm Jonas TAVR, mild-moderate paravalvular AI, PPM, LV GLS -14.5%, atrial septal aneurysm, severely dilated LA, and a dilated RA.  At present, he is doing well.  His edema is mild. \par \par \par

## 2022-04-20 ENCOUNTER — APPOINTMENT (OUTPATIENT)
Dept: HEART AND VASCULAR | Facility: CLINIC | Age: 87
End: 2022-04-20
Payer: MEDICARE

## 2022-04-20 ENCOUNTER — NON-APPOINTMENT (OUTPATIENT)
Age: 87
End: 2022-04-20

## 2022-04-20 PROCEDURE — 93296 REM INTERROG EVL PM/IDS: CPT

## 2022-04-20 PROCEDURE — 93294 REM INTERROG EVL PM/LDLS PM: CPT

## 2022-05-02 ENCOUNTER — APPOINTMENT (OUTPATIENT)
Dept: HEART AND VASCULAR | Facility: CLINIC | Age: 87
End: 2022-05-02
Payer: MEDICARE

## 2022-05-02 VITALS
BODY MASS INDEX: 23.64 KG/M2 | HEIGHT: 68 IN | OXYGEN SATURATION: 93 % | SYSTOLIC BLOOD PRESSURE: 138 MMHG | HEART RATE: 75 BPM | DIASTOLIC BLOOD PRESSURE: 63 MMHG | TEMPERATURE: 96.7 F | WEIGHT: 156 LBS

## 2022-05-02 PROCEDURE — 93281 PM DEVICE PROGR EVAL MULTI: CPT

## 2022-05-02 RX ORDER — LEVOTHYROXINE SODIUM 150 UG/1
150 TABLET ORAL
Qty: 90 | Refills: 0 | Status: ACTIVE | COMMUNITY
Start: 2022-04-01

## 2022-05-02 RX ORDER — LEVOTHYROXINE SODIUM 175 UG/1
175 TABLET ORAL
Refills: 0 | Status: DISCONTINUED | COMMUNITY
Start: 2020-03-03 | End: 2022-05-02

## 2022-05-02 RX ORDER — DICLOFENAC SODIUM 20 MG/G
2 SOLUTION TOPICAL
Qty: 672 | Refills: 0 | Status: COMPLETED | COMMUNITY
Start: 2022-04-27

## 2022-05-02 NOTE — HISTORY OF PRESENT ILLNESS
[de-identified] : Mr. Mays is a pleasant 89 year-old gentleman with a past medical history significant for HTN, HLD, CAD s/p CABG x 5 (04/06/15), YORDY s/p left CEA, AS s/p TAVR 4/2/19, HFrEF and biventricular pacemaker who presents to establish EP care today.  \par \par The patient reports that the initial pacemaker implanted on the left side in 2016 had bleeding in the pocket and he underwent a revision.  He then had wound dehiscence and the system was extracted and reimplanted on the right side.  He denies any further issues with the pacemaker.

## 2022-05-02 NOTE — DISCUSSION/SUMMARY
[FreeTextEntry1] : Mr. Mays is a pleasant 89 year-old gentleman with a past medical history significant for HTN, HLD, CAD s/p CABG x 5 (04/06/15), YORDY s/p left CEA, AS s/p TAVR 4/2/19, HFrEF and biventricular pacemaker who presents to establish EP care today.  The device is functioning appropriately as programmed and all measured data is WNL.  The patient is enrolled in remote monitoring and was asked to return for follow-up in six months.  Advised to call with any questions or concerns in the interim.\par

## 2022-05-02 NOTE — PROCEDURE
[CRT-P] : Cardiac resynchronization therapy pacemaker [DDDR] : DDDR [Lead Imp:  ___ohms] : lead impedance was [unfilled] ohms [Sensing Amplitude ___mv] : sensing amplitude was [unfilled] mv [___V @] : [unfilled] V [___ ms] : [unfilled] ms [de-identified] : [facility] [de-identified] : 70 [de-identified] : 4.5 years to KA[facility]  [de-identified] : AP 98\par \par \par Increased RV output to 2.5V @ 0.5 ms for adequate safey margin

## 2022-05-02 NOTE — ADDENDUM
[FreeTextEntry1] : I, Vee Martinez, hereby attest that the medical record entry for this patient accurately reflects signatures/notations that I made on the Date of Service in my capacity as an Attending Physician when I treated/diagnosed the above patient. I do hereby attest that this information is true, accurate and complete to the best of my knowledge.  I was present for the entire visit and supervised the entire visit and made/agree with the plan as outlined.\par

## 2022-07-20 ENCOUNTER — NON-APPOINTMENT (OUTPATIENT)
Age: 87
End: 2022-07-20

## 2022-07-20 ENCOUNTER — APPOINTMENT (OUTPATIENT)
Dept: HEART AND VASCULAR | Facility: CLINIC | Age: 87
End: 2022-07-20

## 2022-07-20 PROCEDURE — 93294 REM INTERROG EVL PM/LDLS PM: CPT

## 2022-07-20 PROCEDURE — 93296 REM INTERROG EVL PM/IDS: CPT

## 2022-07-25 ENCOUNTER — APPOINTMENT (OUTPATIENT)
Dept: OPHTHALMOLOGY | Facility: CLINIC | Age: 87
End: 2022-07-25

## 2022-07-25 ENCOUNTER — OUTPATIENT (OUTPATIENT)
Dept: OUTPATIENT SERVICES | Facility: HOSPITAL | Age: 87
LOS: 1 days | Discharge: HOME | End: 2022-07-25

## 2022-07-25 DIAGNOSIS — Z95.0 PRESENCE OF CARDIAC PACEMAKER: Chronic | ICD-10-CM

## 2022-07-25 DIAGNOSIS — Z98.890 OTHER SPECIFIED POSTPROCEDURAL STATES: Chronic | ICD-10-CM

## 2022-07-25 DIAGNOSIS — Z95.1 PRESENCE OF AORTOCORONARY BYPASS GRAFT: Chronic | ICD-10-CM

## 2022-07-25 PROCEDURE — 92136 OPHTHALMIC BIOMETRY: CPT | Mod: 26

## 2022-09-07 NOTE — ASU PATIENT PROFILE, ADULT - NSSUBSTANCEUSE_GEN_ALL_CORE_SD
Vaccine Information Statement(s) given and reviewed; questions answered and verbal consent given by Parent for injection(s) administered.   never used

## 2022-10-19 ENCOUNTER — NON-APPOINTMENT (OUTPATIENT)
Age: 87
End: 2022-10-19

## 2022-10-19 ENCOUNTER — APPOINTMENT (OUTPATIENT)
Dept: HEART AND VASCULAR | Facility: CLINIC | Age: 87
End: 2022-10-19

## 2022-10-19 PROCEDURE — 93296 REM INTERROG EVL PM/IDS: CPT

## 2022-10-19 PROCEDURE — 93294 REM INTERROG EVL PM/LDLS PM: CPT

## 2022-11-16 ENCOUNTER — APPOINTMENT (OUTPATIENT)
Dept: HEART AND VASCULAR | Facility: CLINIC | Age: 87
End: 2022-11-16

## 2022-11-17 ENCOUNTER — NON-APPOINTMENT (OUTPATIENT)
Age: 87
End: 2022-11-17

## 2022-12-20 ENCOUNTER — APPOINTMENT (OUTPATIENT)
Dept: HEART AND VASCULAR | Facility: CLINIC | Age: 87
End: 2022-12-20

## 2022-12-20 VITALS
SYSTOLIC BLOOD PRESSURE: 157 MMHG | BODY MASS INDEX: 24.25 KG/M2 | DIASTOLIC BLOOD PRESSURE: 71 MMHG | WEIGHT: 160 LBS | HEIGHT: 68 IN

## 2022-12-20 PROCEDURE — 99214 OFFICE O/P EST MOD 30 MIN: CPT | Mod: 95

## 2022-12-20 NOTE — REASON FOR VISIT
[FreeTextEntry1] : Diagnostic Tests:\par -----------------------------------\par ECG:\par 12/07/21: A-paced, Bi-V paced.  \par 06/01/21: A-paced, Bi-V paced. \par 02/13/20: A-paced, V-paced. \par 04/29/19: A-sensed, V-paced at 70 bpm.\par 02/07/19: A-sensed, V-paced at 70 bpm. \par 01/09/18: A-sensed, V-paced at 70 bpm. \par 05/12/16: A-sensed, V-paced at 62 BPM. \par 03/24/15: A-paced, V-paced at 64bpm\par 08/28/13: A-paced, V-sensed, RBBB, LAFB, prolonged A paced to V-sensed delay (288ms)\par -----------------------------------\par CTCA:\par 03/19/19: moderately calcified aortic valve, severe TVD, LIMA-LAD patent, SVG-D1 patent, RA-RPL patent, SVG-OM patent, SVG-RPDA patent. \par 02/24/15: Ca+ score 1027.41 (75th percentile), LM distal 50%, LAD prox 75%, LCX moderate, RCA moderate, PDA 50%, EF 56%, no RWMA\par ----------------------------\par Stress:\par 11/04/20: regadenoson MPI: EF 59%, normal perfusion. \par 10/04/17: regadenoson MPI: EF 47%, normal perfusion.\par 03/03/15: exercise MPI: 7 METS, EF 54%, fixed inferior and inferoseptal defects\par 02/29/12: exercise MPI: 7 METS, mild inferior and inferoseptal scar, no ischemia, EF 54%, no RWMA\par 03/02/09: exercise MPI: 7 METS, mild inferior and inferoseptal scar, no ischemia, EF 54%, no RWMA\par ----------------------------\par Echo:\par 12/07/21: EF 52%, hypokinesis basal and mid inferior and basal inferolateral walls, grade I diastolic dysfunction, 26mm Jonas TAVR, mild-moderate paravalvular AI, PPM, LV GLS -14.5%, atrial septal aneurysm, severely dilated LA, dilated RA.  \par 08/20/20: EF 49%, hypokinesis basal and mid inferior and basal inferolateral walls, grade I diastolic dysfunction, 26mm Jonas TAVR, mild-moderate paravalvular AI, PPM.\par 04/29/19: EF 55%, LVH, dilated LA, s/p TAVR normally functioning, mild TR. \par 04/03/19: EF 54%, LVH, akinesis basal and mid IL, PPM, s/p 26mm TAVR (PV 2.1m/s, MG 11.8mmHg, LVOT/AV 0.33, NATE 2.4cm2). \par 02/12/19: E 44%, akinesis basal/mid IL, LVH, dilated LA/RA, normal RV function, severe AS (NATE 0.75cm2, PV 3.6m/s, MG 31mmHg, LVOT/AV 0.25, SVI 22ml/m2), PPM, atrial septal aneurysm.\par 10/05/17: EF 38%, grade I diastolic dysfunction, reduced RV function, akinesis apical septum and basal/mid inferolateral walls, severe AS, NATE 0.8cm2, LVOT/AV 0.23, mean gradient 23mmHg. \par 05/12/16: EF 36%, akinesis apical anterior, apical inferior and apical walls, moderate AS, NATE 1.2cm2, moderate TR, mild MR, LVH\par 04/02/15: EF 41%, mid LAD MI, mild AS, NATE 1.7cm2, grade I diastolic dysfunction, dilated LA, mild TR, PASP 36mmHg\par 02/28/12: EF 55%, mild TR, diastolic dysfunction\par 02/14/09: EF 55%, aortic sclerosis, diastolic dysfunction\par -----------------------------\par Tilt table test:\par 08/14/13: + orthostatic induced vasovagal symptoms\par ----------------------------\par Carotid:\par 11/17/21: sono: EMI 40-59% stenosis, LICA patent CEA. \par 10/14/20: sono: EMI 40-59% stenosis, LICA patent CEA. \par 11/13/19: sono: EMI 40-59% stenosis, LICA patent CEA. \par 03/19/19: sono: 20-39% b/l ICA.\par 11/15/17: sono: EMI 40-59%, s/p left CEA patent\par 04/03/15: sono: s/p left CEA, LICA 20-49%, EMI 50-69%\par ----------------------------\par Cath:\par 04/02/19: successful 26mm Jonas III TAVR\par 03/26/19: RA mid 50%, distal 80%, RDPA competitive flow from graft, LM 40%, LAD prox 100%, Lcx prox 50%, OM1 fills via graft, OM2 60%, LIMA-LAD patent, radial-LPL patent, SVG-diagonal patent, SVG-OM patent, SVG-RPDA patent, AV MG 41mmHg. \par 03/16/15: EF 50%, LAD MI, LM distal 40%, LAD prox 95%, mid 80%, D3 50%, LCX prox 50%, distal 90%, LPL ostium 80%, RCA mid 40%, pDA 100% with collaterals from LAD\par ----------------------------\par Aorta-Iliac:\par 11/17/21: sono: mid aorta aneurysm 4.26cm x 4.25cm, EMI aneurysm 2.83cm, LICA 1.83cm. \par 05/07/21: sono: mid aorta aneurysm 3.99cm x 3.97cm, RCIA aneurysm 2.67cm, LICA 1.83cm. \par 10/14/20: sono: mid aorta aneurysm 3.89cm x 3.85cm, RCIA aneurysm 2.58cm x 2.47cm. \par 11/13/19: sono: mid aorta aneurysm 3.98cm x 3.88cm, RCIA aneurysm 2.54cm x 2.47cm. \par 11/15/17: sono: AAA mid 3.6cm x 3.2cm, RCIA 2.5cm x 2.3cm. LCIA 1.9cm x 1.7cm.\par ----------------------------\par Upper extremity veins:\par 11/17/16: sono: right: no RUE DVT.

## 2022-12-20 NOTE — HISTORY OF PRESENT ILLNESS
[FreeTextEntry1] : Mr. Mays presents for follow up and management of HTN, dyslipidemia, CAD s/p CABG x 5 (04/06/15), chronic systolic heart failure, s/p PPM '16 upgraded to CRT-P 11/2016, aortic stenosis s/p TAVR 04/02/19, and YORDY s/p left CEA. . He underwent 5-vessel coronary artery bypass grafting on 04/06/15. He had an echocardiogram on 05/12/16 which revealed an EF of 36%, akinesis of the apical anterior, apical inferior, and apical walls, moderate AS, NATE 1.2cm2, moderate TR, mild MR, and mild LVH. His pacemaker was upgraded to a CRT-P on 08/03/16. He had bleeding into the pocket and had pocket revision on 09/28/16. He was evaluated by Antonio Bustillo MD (general surgeon) at [facility] to address wound healing over the device. In 2017, in the office I examined the device wound and noted the wound edges not to be opposed and the device to be partially exposed. He went on to have the device extracted and replaced (11/2016) (Osito Alicea MD). He had an MPI stress test on 10/04/17 which revealed an EF of 47%, normal perfusion.  He underwent successful TAVR 26mm Jonas on 04/02/19  (John Mccormick MD).  The procedure was complicated by urinary retention which has since resolved.  On 03/19/19 he had a CTCA which revealed: moderately calcified aortic valve, severe TVD, LIMA-LAD patent, SVG-D1 patent, RA-RPL patent, SVG-OM patent, and SVG-RPDA patent. He had a regadenoson MPI stress test on 11/04/20 which revealed an EF of 59% and normal perfusion.  On, 12/07/21, he had an echocardiogram which revealed an EF of 52%, hypokinesis basal and mid inferior and basal inferolateral walls, grade I diastolic dysfunction, 26mm Jonas TAVR, mild-moderate paravalvular AI, PPM, LV GLS -14.5%, atrial septal aneurysm, severely dilated LA, and a dilated RA.  At present, he is doing well.  On 11/17/22, he called to inform me that, in July, he sustained a hip fracture which required surgical repair at [facility].  At present, he is still recovering from the femur fracture.  He is ambulating with a rollator walker.  Of note, he cares for his wife who has dementia.  His edema is mild at present.  I discussed seeing a cardiologist in the [facility] system whose office is in [place] (Supreeti Behuria, MD) given his difficulty commuting.  \par \par

## 2022-12-20 NOTE — ASSESSMENT
[FreeTextEntry1] : 1. CAD: s/p CABG x 5 (04/06/15) (Malachi Jose MD), s/p cath 03/26/19: RA mid 50%, distal 80%, RDPA competitive flow from graft, LM 40%, LAD prox 100%, Lcx prox 50%, OM1 fills via graft, OM2 60%, LIMA-LAD patent, radial-LPL patent, SVG-diagonal patent, SVG-OM patent, SVG-RPDA patent, AV MG 41mmHg, s/p CTCA 03/19/19: moderately calcified aortic valve, severe TVD, LIMA-LAD patent, SVG-D1 patent, RA-RPL patent, SVG-OM patent, SVG-RPDA patent,\par             - continue aspirin 81mg po daily \par             - will pursue aggressive medical management\par             - will send for an echocardiogram (? next visit in [place])\par \par 2. Chronic diastolic heart failure: EF improved: s/p echo: 12/07/21: EF 52%, hypokinesis basal and mid inferior and basal inferolateral walls, grade I diastolic dysfunction, 26mm Jonas TAVR, mild-moderate paravalvular AI, PPM, LV GLS -14.5%, atrial septal aneurysm, severely dilated LA, dilated RA.  \par             - continue prn loop diuretics, furosemide 80mg po daily prn \par             - discussed with patient importance of maintaining a weight log, avoidance of dietary sodium, as well as appropriate use of loop diuretics\par             - will follow with serial echocardiograms \par \par 3. Carotid stenosis: s/p left CEA 12/14/04 (Kareem Swain MD), 11/17/21: sono: EMI 40-59%, left CEA patent\par             - will monitor with periodic carotid sonograms (performed at the vascular surgeon's office)\par             - continue statin and anti-platelet agent\par             - discussed with patient surveillance for neurologic symptoms. \par \par 4. Aortic stenosis: s/p successful 26mm Jonas III TAVR 04/09/19 (John Mccormick MD): s/p echo: 12/07/21: EF 52%, hypokinesis basal and mid inferior and basal inferolateral walls, grade I diastolic dysfunction, 26mm Jonas TAVR, mild-moderate paravalvular AI, PPM, LV GLS -14.5%, atrial septal aneurysm, severely dilated LA, dilated RA.  \par             - will follow with serial echocardiograms \par  \par 5. Aortic aneurysm, abdominal: s/p 11/17/21: sono: mid aorta aneurysm 4.26cm x 4.25cm, EMI aneurysm 2.83cm, LICA 1.83cm. \par             - follow up with vascular surgeon, Kareem Swain MD. \par             - continue HTN management\par \par 6. HTN: BP not at ACC/AHA 2017 guideline target, ? occasional low reading on ambulatory log, ? symptomatic\par             - continue labetalol 300mg po bid (he often holds the pm dose if his evening BP is well controlled)\par             - continue valsartan 320mg po qd \par             - if BP remains above target next visit will up titrate anti-HTN regimen \par \par 7. BPH (benign prostatic hyperplasia): urinary retention post TAVR\par             - continue finasteride 5mg po qd\par             - continue tamsulosin 0.4mg po qd \par             - f/u with urologist \par \par 8. Presence of cardiac pacemaker: s/p CRT-P, upgraded from PPM 09/2016 complicated by hematoma requiring device extraction and replacement on the right side (11/2016).\par             - continue follow up with device clinic (has not had an interrogation in some time)\par \par 9. Dyslipidemia: lipids at goal\par            - continue atorvastatin 80mg po daily\par            - patient will provide copy of recent lab work from PMD's office for my review\par \par \par This encounter was performed as a telehealth encounter employing the [employer], [employer], or other approved audio/video platform.  All components of the evaluation and management were performed per clinical routine with the exception of the physical exam.  The physical exam references my most recent physical exam plus any additional information provided by the patient (i.e. ambulatory vitals/weight) or inspection from the video portion of the encounter. \par \par I spent in excess of 40 minutes on the encounter.  \par \par Verbal consent was given on 12/20/22 by Markus Mays.  \par \par Patient location: The patient was located at the primary address listed in the medical record.\par Physician location: I was located in my office in [facility]. \par \par \par

## 2023-01-18 ENCOUNTER — NON-APPOINTMENT (OUTPATIENT)
Age: 88
End: 2023-01-18

## 2023-01-18 ENCOUNTER — APPOINTMENT (OUTPATIENT)
Dept: HEART AND VASCULAR | Facility: CLINIC | Age: 88
End: 2023-01-18
Payer: MEDICARE

## 2023-01-18 PROCEDURE — 93296 REM INTERROG EVL PM/IDS: CPT

## 2023-01-18 PROCEDURE — 93294 REM INTERROG EVL PM/LDLS PM: CPT

## 2023-04-03 ENCOUNTER — APPOINTMENT (OUTPATIENT)
Dept: HEART AND VASCULAR | Facility: CLINIC | Age: 88
End: 2023-04-03
Payer: MEDICARE

## 2023-04-03 ENCOUNTER — NON-APPOINTMENT (OUTPATIENT)
Age: 88
End: 2023-04-03

## 2023-04-03 VITALS
BODY MASS INDEX: 25.01 KG/M2 | WEIGHT: 165 LBS | SYSTOLIC BLOOD PRESSURE: 156 MMHG | DIASTOLIC BLOOD PRESSURE: 74 MMHG | HEIGHT: 68 IN | HEART RATE: 70 BPM | TEMPERATURE: 97.8 F

## 2023-04-03 PROCEDURE — 93281 PM DEVICE PROGR EVAL MULTI: CPT

## 2023-04-03 NOTE — DISCUSSION/SUMMARY
[FreeTextEntry1] : Mr. Mays is a pleasant 89 year-old gentleman with a past medical history significant for HTN, HLD, CAD s/p CABG x 5 (04/06/15), YORDY s/p left CEA, AS s/p TAVR 4/2/19, HFrEF and biventricular pacemaker who presents for routine follow-up.  The device is functioning appropriately as programmed and all measured data is WNL.  The patient is enrolled in remote monitoring and was asked to return for follow-up in six months.  Advised to call with any questions or concerns in the interim.\par

## 2023-04-03 NOTE — HISTORY OF PRESENT ILLNESS
[de-identified] : Mr. Mays is a pleasant 89 year-old gentleman with a past medical history significant for HTN, HLD, CAD s/p CABG x 5 (04/06/15), YORDY s/p left CEA, AS s/p TAVR 4/2/19, HFrEF and biventricular pacemaker who presents for routine follow-up.\par \par The patient reports that the initial pacemaker implanted on the left side in 2016 had bleeding in the pocket and he underwent a revision.  He then had wound dehiscence and the system was extracted and reimplanted on the right side.  He denies any further issues with the pacemaker.\par \par SEE PACEART

## 2023-04-04 ENCOUNTER — OUTPATIENT (OUTPATIENT)
Dept: OUTPATIENT SERVICES | Facility: HOSPITAL | Age: 88
LOS: 1 days | End: 2023-04-04
Payer: MEDICARE

## 2023-04-04 ENCOUNTER — APPOINTMENT (OUTPATIENT)
Dept: OPHTHALMOLOGY | Facility: CLINIC | Age: 88
End: 2023-04-04
Payer: MEDICARE

## 2023-04-04 DIAGNOSIS — Z98.890 OTHER SPECIFIED POSTPROCEDURAL STATES: Chronic | ICD-10-CM

## 2023-04-04 DIAGNOSIS — Z95.1 PRESENCE OF AORTOCORONARY BYPASS GRAFT: Chronic | ICD-10-CM

## 2023-04-04 DIAGNOSIS — Z95.0 PRESENCE OF CARDIAC PACEMAKER: Chronic | ICD-10-CM

## 2023-04-04 DIAGNOSIS — H25.89 OTHER AGE-RELATED CATARACT: ICD-10-CM

## 2023-04-04 PROCEDURE — 92136 OPHTHALMIC BIOMETRY: CPT | Mod: 26

## 2023-04-04 PROCEDURE — 92136 OPHTHALMIC BIOMETRY: CPT

## 2023-04-13 DIAGNOSIS — H25.813 COMBINED FORMS OF AGE-RELATED CATARACT, BILATERAL: ICD-10-CM

## 2023-04-29 PROBLEM — I65.29 CAROTID ARTERY STENOSIS: Status: ACTIVE | Noted: 2019-02-05

## 2023-04-29 PROBLEM — I71.20 THORACIC AORTIC ANEURYSM WITHOUT RUPTURE: Status: ACTIVE | Noted: 2019-02-05

## 2023-04-29 PROBLEM — I35.0 AORTIC STENOSIS: Status: ACTIVE | Noted: 2019-02-05

## 2023-04-29 PROBLEM — I10 HTN (HYPERTENSION), BENIGN: Status: ACTIVE | Noted: 2019-02-05

## 2023-04-29 PROBLEM — I25.10 CAD (CORONARY ARTERY DISEASE): Status: ACTIVE | Noted: 2019-02-05

## 2023-04-29 PROBLEM — I50.32 CHRONIC DIASTOLIC HEART FAILURE: Status: ACTIVE | Noted: 2020-02-13

## 2023-04-29 PROBLEM — Z95.2 S/P TAVR (TRANSCATHETER AORTIC VALVE REPLACEMENT): Status: ACTIVE | Noted: 2019-04-08

## 2023-04-29 PROBLEM — I73.9 PAD (PERIPHERAL ARTERY DISEASE): Status: ACTIVE | Noted: 2019-02-05

## 2023-04-29 PROBLEM — R55 SYNCOPE AND COLLAPSE: Status: ACTIVE | Noted: 2019-02-05

## 2023-05-02 ENCOUNTER — NON-APPOINTMENT (OUTPATIENT)
Age: 88
End: 2023-05-02

## 2023-05-02 ENCOUNTER — APPOINTMENT (OUTPATIENT)
Dept: HEART AND VASCULAR | Facility: CLINIC | Age: 88
End: 2023-05-02
Payer: MEDICARE

## 2023-05-02 VITALS
HEART RATE: 70 BPM | OXYGEN SATURATION: 99 % | DIASTOLIC BLOOD PRESSURE: 81 MMHG | SYSTOLIC BLOOD PRESSURE: 190 MMHG | WEIGHT: 160.5 LBS | BODY MASS INDEX: 24.32 KG/M2 | HEIGHT: 68 IN

## 2023-05-02 VITALS — DIASTOLIC BLOOD PRESSURE: 87 MMHG | SYSTOLIC BLOOD PRESSURE: 187 MMHG

## 2023-05-02 DIAGNOSIS — I35.0 NONRHEUMATIC AORTIC (VALVE) STENOSIS: ICD-10-CM

## 2023-05-02 DIAGNOSIS — I50.32 CHRONIC DIASTOLIC (CONGESTIVE) HEART FAILURE: ICD-10-CM

## 2023-05-02 DIAGNOSIS — I71.20 THORACIC AORTIC ANEURYSM, WITHOUT RUPTURE, UNSPECIFIED: ICD-10-CM

## 2023-05-02 DIAGNOSIS — I10 ESSENTIAL (PRIMARY) HYPERTENSION: ICD-10-CM

## 2023-05-02 DIAGNOSIS — R55 SYNCOPE AND COLLAPSE: ICD-10-CM

## 2023-05-02 DIAGNOSIS — Z95.2 PRESENCE OF PROSTHETIC HEART VALVE: ICD-10-CM

## 2023-05-02 DIAGNOSIS — I73.9 PERIPHERAL VASCULAR DISEASE, UNSPECIFIED: ICD-10-CM

## 2023-05-02 DIAGNOSIS — I25.10 ATHEROSCLEROTIC HEART DISEASE OF NATIVE CORONARY ARTERY W/OUT ANGINA PECTORIS: ICD-10-CM

## 2023-05-02 DIAGNOSIS — I65.29 OCCLUSION AND STENOSIS OF UNSPECIFIED CAROTID ARTERY: ICD-10-CM

## 2023-05-02 PROCEDURE — 93000 ELECTROCARDIOGRAM COMPLETE: CPT

## 2023-05-02 PROCEDURE — 99214 OFFICE O/P EST MOD 30 MIN: CPT | Mod: 25

## 2023-05-02 NOTE — HISTORY OF PRESENT ILLNESS
[FreeTextEntry1] : Mr. Mays presents for follow up and management of HTN, dyslipidemia, CAD s/p CABG x 5 (04/06/15), chronic systolic heart failure, s/p PPM '16 upgraded to CRT-P 11/2016, aortic stenosis s/p TAVR 04/02/19, and YORDY s/p left CEA. . He underwent 5-vessel coronary artery bypass grafting on 04/06/15. He had an echocardiogram on 05/12/16 which revealed an EF of 36%, akinesis of the apical anterior, apical inferior, and apical walls, moderate AS, NATE 1.2cm2, moderate TR, mild MR, and mild LVH. His pacemaker was upgraded to a CRT-P on 08/03/16. He had bleeding into the pocket and had pocket revision on 09/28/16. He was evaluated by Antonio Bustillo MD (general surgeon) at [facility] to address wound healing over the device. In 2017, in the office I examined the device wound and noted the wound edges not to be opposed and the device to be partially exposed. He went on to have the device extracted and replaced (11/2016) (Osito Alicea MD). He had an MPI stress test on 10/04/17 which revealed an EF of 47%, normal perfusion.  He underwent successful TAVR 26mm Jonas on 04/02/19  (John Mccormick MD).  The procedure was complicated by urinary retention which has since resolved.  On 03/19/19 he had a CTCA which revealed: moderately calcified aortic valve, severe TVD, LIMA-LAD patent, SVG-D1 patent, RA-RPL patent, SVG-OM patent, and SVG-RPDA patent. He had a regadenoson MPI stress test on 11/04/20 which revealed an EF of 59% and normal perfusion.  On, 12/07/21, he had an echocardiogram which revealed an EF of 52%, hypokinesis basal and mid inferior and basal inferolateral walls, grade I diastolic dysfunction, 26mm Jonas TAVR, mild-moderate paravalvular AI, PPM, LV GLS -14.5%, atrial septal aneurysm, severely dilated LA, and a dilated RA. On 11/17/22, he called to inform me that, in July, he sustained a hip fracture which required surgical repair at [facility].  At present, he is still recovering from the femur fracture.  He is ambulating with a rollator walker.  Of note, he cares for his wife who has dementia. I discussed seeing a cardiologist in the [facility] whose office is in [place] (Supreeti Behuria, MD, or Homer Lee DO) given his difficulty commuting. At present, he has significant lower extremity edema and admits to not taking his prn loop diuretics.  \par

## 2023-05-02 NOTE — ASSESSMENT
[FreeTextEntry1] : 1. CAD: s/p CABG x 5 (04/06/15) (Malachi Jose MD), s/p cath 03/26/19: RA mid 50%, distal 80%, RDPA competitive flow from graft, LM 40%, LAD prox 100%, Lcx prox 50%, OM1 fills via graft, OM2 60%, LIMA-LAD patent, radial-LPL patent, SVG-diagonal patent, SVG-OM patent, SVG-RPDA patent, AV MG 41mmHg, s/p CTCA 03/19/19: moderately calcified aortic valve, severe TVD, LIMA-LAD patent, SVG-D1 patent, RA-RPL patent, SVG-OM patent, SVG-RPDA patent,\par             - continue aspirin 81mg po daily \par             - will pursue aggressive medical management\par             - will send for an echocardiogram \par \par 2. Chronic diastolic heart failure: EF improved: s/p echo: 12/07/21: EF 52%, hypokinesis basal and mid inferior and basal inferolateral walls, grade I diastolic dysfunction, 26mm Jonas TAVR, mild-moderate paravalvular AI, PPM, LV GLS -14.5%, atrial septal aneurysm, severely dilated LA, dilated RA.  \par             - continue prn loop diuretics, furosemide 80mg po daily prn \par             - discussed with patient importance of maintaining a weight log, avoidance of dietary sodium, as well as appropriate use of loop diuretics\par             - will follow with serial echocardiograms (ordered today) \par \par 3. Carotid stenosis: s/p left CEA 12/14/04 (Kareem Swain MD), 11/17/21: sono: EMI 40-59%, left CEA patent\par             - will monitor with periodic carotid sonograms (performed at the vascular surgeon's office)\par             - continue statin and anti-platelet agent\par             - discussed with patient surveillance for neurologic symptoms\par \par 4. Aortic stenosis: s/p successful 26mm Jonas III TAVR 04/09/19 (John Mccormick MD): s/p echo: 12/07/21: EF 52%, hypokinesis basal and mid inferior and basal inferolateral walls, grade I diastolic dysfunction, 26mm Jonas TAVR, mild-moderate paravalvular AI, PPM, LV GLS -14.5%, atrial septal aneurysm, severely dilated LA, dilated RA.  \par             - will follow with serial echocardiograms (ordered today) \par  \par 5. Aortic aneurysm, abdominal: s/p 04/28/23: sono: mid abdominal aorta aneurysm 4.19cm x 4.28cm, EMI aneurysm 2.98cm, LICA 2.39cm:\par             - follow up with vascular surgeon, Kareem Swain MD. \par             - continue HTN management\par             - he will have a CTA in the near future and then possible RCIA intervention\par \par 6. HTN: BP not at ACC/AHA 2017 guideline target, ? occasional low reading on ambulatory log, ? symptomatic\par             - continue labetalol 300mg po bid (consider increasing to 1.5 tabs po bid) \par             - continue valsartan 320mg po qd \par             - if BP remains above target next visit will up titrate anti-HTN regimen \par             - recheck BP next visit when, hopefully, closer to euvolemic\par \par 7. BPH (benign prostatic hyperplasia): urinary retention post TAVR: \par             - continue finasteride 5mg po qd\par             - continue tamsulosin 0.4mg po qd \par             - f/u with urologist \par \par 8. Presence of cardiac pacemaker: s/p CRT-P, upgraded from PPM 09/2016 complicated by hematoma requiring device extraction and replacement on the right side (11/2016): \par             - continue follow up with device clinic \par \par 9. Dyslipidemia: \par            - continue atorvastatin 80mg po daily\par            - patient will provide copy of recent lab work from PMD's office for my review\par \par  10. Lower extremity edema: likely secondary to chronic diastolic HF and venous insufficiency:\par            - continue furosemide 80mg po qd prn (encouraged to take it at least 2 times per week)

## 2023-05-02 NOTE — REASON FOR VISIT
[Other: ____] : [unfilled] [FreeTextEntry1] : Diagnostic Tests:\par -----------------------------------\par ECG:\par 05/02/23: A-paced, Bi-V paced.  \par 12/07/21: A-paced, Bi-V paced.  \par 06/01/21: A-paced, Bi-V paced. \par 02/13/20: A-paced, V-paced. \par 04/29/19: A-sensed, V-paced at 70 bpm.\par 02/07/19: A-sensed, V-paced at 70 bpm. \par 01/09/18: A-sensed, V-paced at 70 bpm. \par 05/12/16: A-sensed, V-paced at 62 BPM. \par 03/24/15: A-paced, V-paced at 64bpm\par 08/28/13: A-paced, V-sensed, RBBB, LAFB, prolonged A paced to V-sensed delay (288ms)\par -----------------------------------\par CTCA:\par 03/19/19: moderately calcified aortic valve, severe TVD, LIMA-LAD patent, SVG-D1 patent, RA-RPL patent, SVG-OM patent, SVG-RPDA patent. \par 02/24/15: Ca+ score 1027.41 (75th percentile), LM distal 50%, LAD prox 75%, LCX moderate, RCA moderate, PDA 50%, EF 56%, no RWMA\par ----------------------------\par Stress:\par 11/04/20: regadenoson MPI: EF 59%, normal perfusion. \par 10/04/17: regadenoson MPI: EF 47%, normal perfusion.\par 03/03/15: exercise MPI: 7 METS, EF 54%, fixed inferior and inferoseptal defects\par 02/29/12: exercise MPI: 7 METS, mild inferior and inferoseptal scar, no ischemia, EF 54%, no RWMA\par 03/02/09: exercise MPI: 7 METS, mild inferior and inferoseptal scar, no ischemia, EF 54%, no RWMA\par ----------------------------\par Echo:\par 12/07/21: EF 52%, hypokinesis basal and mid inferior and basal inferolateral walls, grade I diastolic dysfunction, 26mm Jonas TAVR, mild-moderate paravalvular AI, PPM, LV GLS -14.5%, atrial septal aneurysm, severely dilated LA, dilated RA.  \par 08/20/20: EF 49%, hypokinesis basal and mid inferior and basal inferolateral walls, grade I diastolic dysfunction, 26mm Jonas TAVR, mild-moderate paravalvular AI, PPM.\par 04/29/19: EF 55%, LVH, dilated LA, s/p TAVR normally functioning, mild TR. \par 04/03/19: EF 54%, LVH, akinesis basal and mid IL, PPM, s/p 26mm TAVR (PV 2.1m/s, MG 11.8mmHg, LVOT/AV 0.33, NATE 2.4cm2). \par 02/12/19: E 44%, akinesis basal/mid IL, LVH, dilated LA/RA, normal RV function, severe AS (NATE 0.75cm2, PV 3.6m/s, MG 31mmHg, LVOT/AV 0.25, SVI 22ml/m2), PPM, atrial septal aneurysm.\par 10/05/17: EF 38%, grade I diastolic dysfunction, reduced RV function, akinesis apical septum and basal/mid inferolateral walls, severe AS, NATE 0.8cm2, LVOT/AV 0.23, mean gradient 23mmHg. \par 05/12/16: EF 36%, akinesis apical anterior, apical inferior and apical walls, moderate AS, NATE 1.2cm2, moderate TR, mild MR, LVH\par 04/02/15: EF 41%, mid LAD MI, mild AS, NATE 1.7cm2, grade I diastolic dysfunction, dilated LA, mild TR, PASP 36mmHg\par 02/28/12: EF 55%, mild TR, diastolic dysfunction\par 02/14/09: EF 55%, aortic sclerosis, diastolic dysfunction\par -----------------------------\par Tilt table test:\par 08/14/13: + orthostatic induced vasovagal symptoms\par ----------------------------\par Carotid:\par 11/17/21: sono: EMI 40-59% stenosis, LICA patent CEA. \par 10/14/20: sono: EMI 40-59% stenosis, LICA patent CEA. \par 11/13/19: sono: EMI 40-59% stenosis, LICA patent CEA. \par 03/19/19: sono: 20-39% b/l ICA.\par 11/15/17: sono: EMI 40-59%, s/p left CEA patent\par 04/03/15: sono: s/p left CEA, LICA 20-49%, EMI 50-69%\par ----------------------------\par Cath:\par 04/02/19: successful 26mm Jonas III TAVR\par 03/26/19: RA mid 50%, distal 80%, RDPA competitive flow from graft, LM 40%, LAD prox 100%, Lcx prox 50%, OM1 fills via graft, OM2 60%, LIMA-LAD patent, radial-LPL patent, SVG-diagonal patent, SVG-OM patent, SVG-RPDA patent, AV MG 41mmHg. \par 03/16/15: EF 50%, LAD MI, LM distal 40%, LAD prox 95%, mid 80%, D3 50%, LCX prox 50%, distal 90%, LPL ostium 80%, RCA mid 40%, pDA 100% with collaterals from LAD\par ----------------------------\par Aorta-Iliac:\par 04/28/23: sono: mid abdominal aorta aneurysm 4.19cm x 4.28cm, EMI aneurysm 2.98cm, LICA 2.39cm.\par 11/17/21: sono: mid abdominal aorta aneurysm 4.26cm x 4.25cm, EMI aneurysm 2.83cm, LICA 1.83cm. \par 05/07/21: sono: mid abdominal aorta aneurysm 3.99cm x 3.97cm, RCIA aneurysm 2.67cm, LICA 1.83cm. \par 10/14/20: sono: mid abdominal aorta aneurysm 3.89cm x 3.85cm, RCIA aneurysm 2.58cm x 2.47cm. \par 11/13/19: sono: mid abdominal aorta aneurysm 3.98cm x 3.88cm, RCIA aneurysm 2.54cm x 2.47cm. \par 11/15/17: sono: AAA mid 3.6cm x 3.2cm, RCIA 2.5cm x 2.3cm. LCIA 1.9cm x 1.7cm.\par ----------------------------\par Upper extremity veins:\par 11/17/16: sono: right: no RUE DVT.\par ----------------------------\par Lower extremity veins:\par 04/28/23: sono: right: no DVT.

## 2023-05-05 ENCOUNTER — OUTPATIENT (OUTPATIENT)
Dept: INPATIENT UNIT | Facility: HOSPITAL | Age: 88
LOS: 1 days | Discharge: HOME | End: 2023-05-05
Payer: MEDICARE

## 2023-05-05 VITALS
WEIGHT: 160.06 LBS | DIASTOLIC BLOOD PRESSURE: 84 MMHG | HEART RATE: 70 BPM | SYSTOLIC BLOOD PRESSURE: 183 MMHG | HEIGHT: 68 IN | RESPIRATION RATE: 17 BRPM | OXYGEN SATURATION: 99 % | TEMPERATURE: 96 F

## 2023-05-05 VITALS — SYSTOLIC BLOOD PRESSURE: 200 MMHG | RESPIRATION RATE: 17 BRPM | HEART RATE: 70 BPM | DIASTOLIC BLOOD PRESSURE: 93 MMHG

## 2023-05-05 DIAGNOSIS — Z95.1 PRESENCE OF AORTOCORONARY BYPASS GRAFT: Chronic | ICD-10-CM

## 2023-05-05 DIAGNOSIS — Z98.890 OTHER SPECIFIED POSTPROCEDURAL STATES: Chronic | ICD-10-CM

## 2023-05-05 DIAGNOSIS — Z95.0 PRESENCE OF CARDIAC PACEMAKER: Chronic | ICD-10-CM

## 2023-05-05 DIAGNOSIS — H25.042 POSTERIOR SUBCAPSULAR POLAR AGE-RELATED CATARACT, LEFT EYE: ICD-10-CM

## 2023-05-05 PROCEDURE — V2632: CPT

## 2023-05-05 NOTE — PRE-ANESTHESIA EVALUATION ADULT - NSANTHOSAYNRD_GEN_A_CORE
No. CHERI screening performed.  STOP BANG Legend: 0-2 = LOW Risk; 3-4 = INTERMEDIATE Risk; 5-8 = HIGH Risk

## 2023-05-05 NOTE — ASU PATIENT PROFILE, ADULT - FALL HARM RISK - HARM RISK INTERVENTIONS

## 2023-05-05 NOTE — ASU DISCHARGE PLAN (ADULT/PEDIATRIC) - NS MD DC FALL RISK RISK
For information on Fall & Injury Prevention, visit: [email or web address] OR  [email or web address] OR  https://www.cdc.gov/steadi/patient.html

## 2023-05-09 DIAGNOSIS — I50.9 HEART FAILURE, UNSPECIFIED: ICD-10-CM

## 2023-05-09 DIAGNOSIS — Z79.82 LONG TERM (CURRENT) USE OF ASPIRIN: ICD-10-CM

## 2023-05-09 DIAGNOSIS — Z72.0 TOBACCO USE: ICD-10-CM

## 2023-05-09 DIAGNOSIS — I11.0 HYPERTENSIVE HEART DISEASE WITH HEART FAILURE: ICD-10-CM

## 2023-05-09 DIAGNOSIS — E78.5 HYPERLIPIDEMIA, UNSPECIFIED: ICD-10-CM

## 2023-05-09 DIAGNOSIS — Z95.1 PRESENCE OF AORTOCORONARY BYPASS GRAFT: ICD-10-CM

## 2023-05-09 DIAGNOSIS — H26.9 UNSPECIFIED CATARACT: ICD-10-CM

## 2023-05-09 DIAGNOSIS — I25.10 ATHEROSCLEROTIC HEART DISEASE OF NATIVE CORONARY ARTERY WITHOUT ANGINA PECTORIS: ICD-10-CM

## 2023-05-09 DIAGNOSIS — N40.0 BENIGN PROSTATIC HYPERPLASIA WITHOUT LOWER URINARY TRACT SYMPTOMS: ICD-10-CM

## 2023-05-09 DIAGNOSIS — Z95.0 PRESENCE OF CARDIAC PACEMAKER: ICD-10-CM

## 2023-05-09 NOTE — ASU PREOP CHECKLIST - AICD PRESENT
Thank you for connecting with us today on the [facility] Virtual Health service of [facility]. If you have any questions after your visit, please feel free to contact us at [phone].    What to do in an Emergency:  If you are experiencing a medical emergency, call 911 immediately. Symptoms that require immediate attention require a visit at Urgent Care ([place]), Immediate Care Center ([place]) or the Emergency Room of a nearby hospital.    When to contact a provider:  Contact your provider if symptoms worsen or do not improve.     Do not have a primary care provider?   If you do not have a primary care provider or have any questions about your visit, please call the Virtual Health RN at [phone].    Billing Questions:   If you have any billing concerns, please contact our Billing Department at [phone]. Hours: Mon. - Thu. 7:30 am - 6 pm and Friday 7:30 pm to 5 pm.    Thank you for entrusting us with your care.     You can connect by Video with a Quick Care provider 24/7 for common and non-urgent symptoms. You can also get care by completing an E-Visit questionnaire. Complete a questionnaire by choosing from a list of common health concerns*. A Quick Care provider will respond to your questionnaire answers within an 1 hour (24/7). You will receive a treatment plan, including a prescription if you need one, and/or testing for COVID, Influenza, Mono, RSV, Strep and urine, depending on your symptoms.     Cold Symptoms Behavioral Health Skin Concerns Women's and Men's Health   Everything Else   Cough*    Anxiety* Acne                                 Birth Control Abdominal Discomfort     COVID treatment* Depression *  Bug Bites   Emergency Contraception Acid Reflux   Nasal congestion* Insomnia Cold Sores Erectile Dysfunction                      Excessive Sweating (underarms)          Red/pink eye  Dandruff Smoking Cessation    Headache or migraine*        Sore throat*  Eczema Urinary Symptoms* Joint  pain or stiffness       Sinus infection*    Minor Skin Injuries Vaginal Itching or Discharge* Medication Refills*         Poison Ivy  Nausea, vomiting and diarrhea         Rash   Neck and Back Pain*       Shingles  Seasonal Allergies          Tick bites         no

## 2023-05-22 ENCOUNTER — OUTPATIENT (OUTPATIENT)
Dept: INPATIENT UNIT | Facility: HOSPITAL | Age: 88
LOS: 1 days | Discharge: HOME | End: 2023-05-22
Payer: MEDICARE

## 2023-05-22 VITALS — DIASTOLIC BLOOD PRESSURE: 72 MMHG | HEART RATE: 69 BPM | SYSTOLIC BLOOD PRESSURE: 162 MMHG | RESPIRATION RATE: 17 BRPM

## 2023-05-22 VITALS
RESPIRATION RATE: 17 BRPM | SYSTOLIC BLOOD PRESSURE: 173 MMHG | TEMPERATURE: 96 F | HEIGHT: 68 IN | HEART RATE: 70 BPM | OXYGEN SATURATION: 97 % | DIASTOLIC BLOOD PRESSURE: 81 MMHG | WEIGHT: 160.06 LBS

## 2023-05-22 DIAGNOSIS — Z95.1 PRESENCE OF AORTOCORONARY BYPASS GRAFT: Chronic | ICD-10-CM

## 2023-05-22 DIAGNOSIS — Z98.890 OTHER SPECIFIED POSTPROCEDURAL STATES: Chronic | ICD-10-CM

## 2023-05-22 DIAGNOSIS — H25.041 POSTERIOR SUBCAPSULAR POLAR AGE-RELATED CATARACT, RIGHT EYE: ICD-10-CM

## 2023-05-22 DIAGNOSIS — Z95.0 PRESENCE OF CARDIAC PACEMAKER: Chronic | ICD-10-CM

## 2023-05-22 PROCEDURE — V2632: CPT

## 2023-05-22 NOTE — ASU PATIENT PROFILE, ADULT - FALL HARM RISK - HARM RISK INTERVENTIONS

## 2023-05-24 DIAGNOSIS — H25.89 OTHER AGE-RELATED CATARACT: ICD-10-CM

## 2023-05-24 DIAGNOSIS — I25.10 ATHEROSCLEROTIC HEART DISEASE OF NATIVE CORONARY ARTERY WITHOUT ANGINA PECTORIS: ICD-10-CM

## 2023-05-24 DIAGNOSIS — I11.0 HYPERTENSIVE HEART DISEASE WITH HEART FAILURE: ICD-10-CM

## 2023-05-24 DIAGNOSIS — Z95.0 PRESENCE OF CARDIAC PACEMAKER: ICD-10-CM

## 2023-05-24 DIAGNOSIS — Z95.1 PRESENCE OF AORTOCORONARY BYPASS GRAFT: ICD-10-CM

## 2023-05-24 DIAGNOSIS — I50.9 HEART FAILURE, UNSPECIFIED: ICD-10-CM

## 2023-05-24 DIAGNOSIS — E78.5 HYPERLIPIDEMIA, UNSPECIFIED: ICD-10-CM

## 2023-05-24 DIAGNOSIS — F17.210 NICOTINE DEPENDENCE, CIGARETTES, UNCOMPLICATED: ICD-10-CM

## 2023-05-24 DIAGNOSIS — Z86.79 PERSONAL HISTORY OF OTHER DISEASES OF THE CIRCULATORY SYSTEM: ICD-10-CM

## 2023-05-24 DIAGNOSIS — N40.0 BENIGN PROSTATIC HYPERPLASIA WITHOUT LOWER URINARY TRACT SYMPTOMS: ICD-10-CM

## 2023-05-24 DIAGNOSIS — Z79.82 LONG TERM (CURRENT) USE OF ASPIRIN: ICD-10-CM

## 2023-07-05 ENCOUNTER — APPOINTMENT (OUTPATIENT)
Dept: HEART AND VASCULAR | Facility: CLINIC | Age: 88
End: 2023-07-05
Payer: MEDICARE

## 2023-07-05 ENCOUNTER — NON-APPOINTMENT (OUTPATIENT)
Age: 88
End: 2023-07-05

## 2023-07-05 PROCEDURE — 93296 REM INTERROG EVL PM/IDS: CPT

## 2023-07-05 PROCEDURE — 93294 REM INTERROG EVL PM/LDLS PM: CPT

## 2023-07-10 ENCOUNTER — OUTPATIENT (OUTPATIENT)
Dept: INPATIENT UNIT | Facility: HOSPITAL | Age: 88
LOS: 1 days | Discharge: HOME | End: 2023-07-10
Payer: MEDICARE

## 2023-07-10 VITALS
RESPIRATION RATE: 17 BRPM | HEIGHT: 68 IN | WEIGHT: 160.06 LBS | OXYGEN SATURATION: 98 % | SYSTOLIC BLOOD PRESSURE: 178 MMHG | DIASTOLIC BLOOD PRESSURE: 85 MMHG | TEMPERATURE: 97 F | HEART RATE: 69 BPM

## 2023-07-10 VITALS
DIASTOLIC BLOOD PRESSURE: 81 MMHG | OXYGEN SATURATION: 100 % | SYSTOLIC BLOOD PRESSURE: 165 MMHG | HEART RATE: 70 BPM | RESPIRATION RATE: 18 BRPM

## 2023-07-10 DIAGNOSIS — Z98.890 OTHER SPECIFIED POSTPROCEDURAL STATES: Chronic | ICD-10-CM

## 2023-07-10 DIAGNOSIS — H25.812 COMBINED FORMS OF AGE-RELATED CATARACT, LEFT EYE: ICD-10-CM

## 2023-07-10 DIAGNOSIS — Z95.0 PRESENCE OF CARDIAC PACEMAKER: Chronic | ICD-10-CM

## 2023-07-10 DIAGNOSIS — Z95.1 PRESENCE OF AORTOCORONARY BYPASS GRAFT: Chronic | ICD-10-CM

## 2023-07-10 PROCEDURE — V2632: CPT

## 2023-07-10 RX ORDER — ROSUVASTATIN CALCIUM 5 MG/1
1 TABLET ORAL
Refills: 0 | DISCHARGE

## 2023-07-10 RX ORDER — ACYCLOVIR SODIUM 500 MG
1 VIAL (EA) INTRAVENOUS
Refills: 0 | DISCHARGE

## 2023-07-10 RX ORDER — MELOXICAM 15 MG/1
1 TABLET ORAL
Refills: 0 | DISCHARGE

## 2023-07-10 RX ORDER — VALSARTAN 80 MG/1
1 TABLET ORAL
Refills: 0 | DISCHARGE

## 2023-07-10 NOTE — ASU PATIENT PROFILE, ADULT - NSICDXPASTMEDICALHX_GEN_ALL_CORE_FT
PAST MEDICAL HISTORY:  Aortic aneurysm     Aortic stenosis     BPH (benign prostatic hyperplasia)     CAD in native artery     CHF, chronic     H/O carotid artery stenosis     HLD (hyperlipidemia)     HTN (hypertension)     Hypothyroid     OA (osteoarthritis)

## 2023-07-10 NOTE — ASU PREOP CHECKLIST - IDENTIFICATION BAND VERIFIED
"Jorge Denton is a 25 year old male who is being evaluated via a billable video visit.      The patient has been notified and verbally consented to the following:     This video visit will be conducted between you and your provider.    Patient has opted to conduct today's video visit vs an in-person appointment, and is not able to attend due to possible exposure to COVID-19.      If during the course of the call the provider feels a video visit is not appropriate, you will not be charged for this service.    Call initiated at: 1:50  Type of Video Platform Used: [employer]  Location of provider: Residence  Location of patient: Residence    [facility] VOICE CLINIC  THERAPY NOTE (CPT 84579)    Patient: Jorge Denton  Date of Service: 1/13/2022  Impressions from most recent evaluation by my colleague Kristen Pardo CCC-SLP on 12/2/2021:  \"Jorge Denton is a 25 year old, presenting today for follow-up with Dysphonia increased vocal effort and throat pain in the context of laryngeal hyperfunction and a history of Lyme Disease near the time of onset.  Remarkable findings included:    Perceptual evaluation demonstrated: Essentially WNL    Laryngeal exam demonstrated: mild irritation of the medial arytenoid mucosal walls, and mild to moderate 4-way constriction    Primary complaint of patient today included: ongoing throat discomfort that he would like to continue to reduce with the help of speech therapy, and also a recent decline in his ability to safely swallow all textures.    Therefore, we recommended he continue a course of speech therapy to address throat discomfort.\"    SUBJECTIVE:  Since the patient's last session, they report the following:     Overall symptoms are the same or worse    Working with a chiropractor on neck tension    Last week voice was good     This week it has degraded in terms of strain    Started a new medication that seemed to flare    OBJECTIVE:  PATIENT REPORTED MEASURES:    Patient Specific Goal " "Metrics:  Dysponia SLP Goals 8/25/2021 10/13/2021 1/13/2022   How would you rate your speaking voice quality, if 0 is worst voice quality, and 10 is best voice? 6 4 4   How much effort is it to speak, if 0 is no extra effort and 10 is maximum effort? 5 7 7   How much effort is it to sing, if 0 is no extra effort and 10 is maximum effort? - - -   How how severe is your [Throat Discomfort - or use patient specific description], if 0 is no [discomfort] at all and 10 is the worst [discomfort]? 3 7 6   How would you rate your breathing, if 0 is the worst and 10 is the best? - - -   How much does your voice problem bother you? Somewhat A little bit Quite a bit   How much does your breathing problem bother you?         - - -   How much does your throat discomfort bother you?     Somewhat Quite a bit Somewhat     Patient Supplied Answers To Last 2 VHI Questionnaires  Voice Handicap Index (VHI-10) 12/3/2021 1/13/2022   My voice makes it difficult for people to hear me 3 3   People have difficulty understanding me in a noisy room 3 3   My voice difficulties restrict my personal and social life.  3 3   I feel left out of conversations because of my voice 3 3   My voice problem causes me to lose income 3 2   I feel as though I have to strain to produce voice 4 3   The clarity of my voice is unpredictable 4 4   My voice problem upsets me 4 4   My voice makes me feel handicapped 2 3   People ask, \"What's wrong with your voice?\" 2 2   VHI-10 31 30     Patient Supplied Answers To Last 2 EAT Questionnaires  Eating Assessment Tool (EAT-10) 12/3/2021 1/13/2022   My swallowing problem has caused me to lose weight 0 0   My swallowing problem interferes with my ability to go out for meals 2 0   Swallowing liquids takes extra effort 3 2   Swallowing solids takes extra effort 4 2   Swallowing pills takes extra effort 4 2   Swallowing is painful 2 1   The pleasure of eating is affected by my swallowing 1 0   When I swallow food sticks in my " throat 4 3   I cough when I eat 2 0   Swallowing is stressful 4 2   EAT-10 26 12     THERAPEUTIC ACTIVITIES    Counseling and Education:    Principles of voice associated with manual therapy techniques discussed, and application to work with chiropractic care was outlined    Exercises to promote decreased tension and strain in voice use    Abbreviated grounded breathing exercise was utilized initially    Significant breathiness and excessive degree of airflow was noted    A counting task was used with short breath groups to raise awareness of optimal amount of inhalation    This was helpful in avoiding hyperinflation and excessive airflow    Good accuracy with minimal to moderate support    Forward resonant sounds were helpful also and focusing    Emphasis was placed on comfortable loudness to avoid guarding    /jam/most facilitating syllable    Techniques applied to extended vocal tasks with the patient's music    He was able to consistently access voice at a high tessitura (C4-F4) without visible strain    Patient reported significantly greater ease with the use of these techniques      A regimen for home practice was instructed.    I provided handouts of today's therapeutic activities to facilitate practice.    ASSESSMENT/PLAN  PROGRESS TOWARD LONG TERM GOALS:   Adequate progress; please see above    IMPRESSIONS: Dysphonia increased vocal effort and throat pain in the context of laryngeal hyperfunction and a history of Lyme Disease near the time of onset. Domenico reports that his symptoms have exacerbated recently.  Given the frequent presentation of the symptoms in association with more extended voice use this was targeted directly during today's session.  Forward resonant sounds and balanced airflow (versus tendency towards hyperinflation and excessive airflow) was used to good effect.    PLAN: I will see Domenico in 2 to 4 weeks as schedule permits at which point we will continue to advance optimize patterns of voicing  and extended tasks.  For practice goals see AVS.       TOTAL SERVICE TIME: 50 minutes  TREATMENT (46242)  NO CHARGE FACILITY FEE (46733)    Louis Barfield M.M., M.A., CCC-SLP  Speech-Language Pathologist  Certificate of Vocology  [phone]    *this report was created in part through the use of computerized dictation software, and though reviewed following completion, some typographic errors may persist.  If there is confusion regarding any of this notes contents, please contact me for clarification.*     done

## 2023-07-10 NOTE — ASU DISCHARGE PLAN (ADULT/PEDIATRIC) - NS MD DC FALL RISK RISK
For information on Fall & Injury Prevention, visit: [email or web address] OR  [email or web address] OR  https://www.cdc.gov/steadi/patient.html

## 2023-07-10 NOTE — ASU PATIENT PROFILE, ADULT - FALL HARM RISK - HARM RISK INTERVENTIONS

## 2023-07-13 DIAGNOSIS — H52.7 UNSPECIFIED DISORDER OF REFRACTION: ICD-10-CM

## 2023-07-13 DIAGNOSIS — H53.71 GLARE SENSITIVITY: ICD-10-CM

## 2023-07-13 DIAGNOSIS — I25.10 ATHEROSCLEROTIC HEART DISEASE OF NATIVE CORONARY ARTERY WITHOUT ANGINA PECTORIS: ICD-10-CM
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
## 2023-07-13 DIAGNOSIS — I50.9 HEART FAILURE, UNSPECIFIED: ICD-10-CM

## 2023-07-13 DIAGNOSIS — Z95.0 PRESENCE OF CARDIAC PACEMAKER: ICD-10-CM

## 2023-07-13 DIAGNOSIS — Z79.82 LONG TERM (CURRENT) USE OF ASPIRIN: ICD-10-CM

## 2023-07-13 DIAGNOSIS — E03.9 HYPOTHYROIDISM, UNSPECIFIED: ICD-10-CM

## 2023-07-13 DIAGNOSIS — T85.22XA DISPLACEMENT OF INTRAOCULAR LENS, INITIAL ENCOUNTER: ICD-10-CM

## 2023-07-13 DIAGNOSIS — E78.5 HYPERLIPIDEMIA, UNSPECIFIED: ICD-10-CM
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
## 2023-07-13 DIAGNOSIS — I11.0 HYPERTENSIVE HEART DISEASE WITH HEART FAILURE: ICD-10-CM

## 2023-07-13 DIAGNOSIS — M19.90 UNSPECIFIED OSTEOARTHRITIS, UNSPECIFIED SITE: ICD-10-CM

## 2023-07-13 DIAGNOSIS — Z95.1 PRESENCE OF AORTOCORONARY BYPASS GRAFT: ICD-10-CM

## 2023-07-21 NOTE — PROGRESS NOTE ADULT - SUBJECTIVE AND OBJECTIVE BOX
07/21/2023  Senia Vick is a 49 y.o., female.    Patient Active Problem List   Diagnosis    Iron deficiency anemia    Feeling anxious    Encounter for screening colonoscopy         Pre-op Assessment    I have reviewed the Patient Summary Reports.     I have reviewed the Nursing Notes. I have reviewed the NPO Status.   I have reviewed the Medications.     Review of Systems  Anesthesia Hx:  Denies Family Hx of Anesthesia complications.    Social:  Non-Smoker, Social Alcohol Use    Cardiovascular:  Cardiovascular Normal Exercise tolerance: good  Denies Hypertension.  Denies CAD.     Denies CHF.    Pulmonary:  Pulmonary Normal    Hepatic/GI:  Hepatic/GI Normal Bowel Prep.    Neurological:  Neurology Normal    Endocrine:  Endocrine Normal    Psych:   Psychiatric History anxiety          Physical Exam  General: Cooperative and Alert    Airway:  Mallampati: II   Mouth Opening: Normal  TM Distance: Normal  Tongue: Normal  Neck ROM: Normal ROM    Dental:  Intact         Anesthesia Plan  Type of Anesthesia, risks & benefits discussed:    Anesthesia Type: MAC, Gen Natural Airway  Intra-op Monitoring Plan: Standard ASA Monitors  Induction:  IV  Informed Consent: Informed consent signed with the Patient and all parties understand the risks and agree with anesthesia plan.  All questions answered.   ASA Score: 2  Day of Surgery Review of History & Physical: H&P Update referred to the surgeon/provider.    Ready For Surgery From Anesthesia Perspective.     .      
History of Present Illness  Mr. Mays presents for follow up and management of HTN, dyslipidemia, CAD s/p CABG x 5 (04/06/15), chronic systolic heart failure, severe low gradient aortic stenosis, and YORDY s/p left CEA. He underwent 5-vessel coronary artery bypass grafting on 04/06/15. He had an echocardiogram on 16 which revealed an EF of 36%, akinesis of the apical anterior, apical inferior, and apical walls, moderate AS, NATE 1.2cm2, moderate TR, mild MR, and mild LVH. His pacemaker was upgraded to a CRT-P on 16. He had bleeding into the pocket and had pocket revision on 16. He was evaluated by Antonio Bustillo MD (general surgeon) at [facility] to address wound healing over the device. In , in the office I examined the device wound and noted the wound edges not to be opposed and the device to be partially exposed. He went on to have the device extracted and replaced (2016) (Osito Alicea MD). He had an MPI stress test on 10/04/17 which revealed an EF of 47%, normal perfusion. He had an echocardiogram on 10/05/17 which revealed an EF of 38%, grade I diastolic dysfunction, reduced RV function, akinesis apical septum and basal/mid inferolateral walls, severe AS, NATE 0.8cm2, LVOT/AV 0.23, and mean gradient 23mmHg. At present, he has complaints of "loosing a step" and progressive BRANDT despite minimal lower extremity edema. We discussed the possibility that this represents progression of his aortic stenosis. His ambulatory BP log reveals evening readings with an SBP range of 98--140. We discussed maintaining an ambulatory BP log and holding his evening dose of labetalol if his SBP pre-medication is <120 in the evenings.     19: now s/p successful TAVR without complications    Diagnostic Tests:  -----------------------------------  EC19:  A-sensed, V-paced at 70 bpm.   18: A-sensed, V-paced at 70 bpm.   16: A-sensed, V-paced at 62 BPM.   03/24/15: A-paced, V-paced at 64bpm  13: A-paced, V-sensed, RBBB, LAFB, prolonged A paced to V-sensed delay (288ms)  -----------------------------------  CTCA:  19: moderately calcified aortic valve, severe TVD, LIMA-LAD patent, SVG-D1 patent, RA-RPL patent, SVG-OM patent, SCG-RPDA patent,   02/24/15: Ca+ score 1027.41 (75th percentile), LM distal 50%, LAD prox 75%, LCX moderate, RCA moderate, PDA 50%, EF 56%, no RWMA  ----------------------------  Stress:  10/04/17: regadenoson MPI: EF 47%, normal perfusion.  03/03/15: exercise MPI: 7 METS, EF 54%, fixed inferior and inferoseptal defects  12: exercise MPI: 7 METS, mild inferior and inferoseptal scar, no ischemia, EF 54%, no RWMA  09: exercise MPI: 7 METS, mild inferior and inferoseptal scar, no ischemia, EF 54%, no RWMA  ----------------------------  Echo:  19: E 44%, akinesis basal/mid IL, LVH, dilated LA/RA, normal RV function, severe AS (NATE 0.75cm2, PV 3.6m/s, MG 31mmHg, LVOT/AV 0.25, SVI 22ml/m2), PPM, atrial septal aneurysm.  10/05/17: EF 38%, grade I diastolic dysfunction, reduced RV function, akinesis apical septum and basal/mid inferolateral walls, severe AS, NATE 0.8cm2, LVOT/AV 0.23, mean gradient 23mmHg.   16: EF 36%, akinesis apical anterior, apical inferior and apical walls, moderate AS, NATE 1.2cm2, moderate TR, mild MR, LVH  04/02/15: EF 41%, mid LAD MI, mild AS, NATE 1.7cm2, grade I diastolic dysfunction, dilated LA, mild TR, PASP 36mmHg  12: EF 55%, mild TR, diastolic dysfunction  09: EF 55%, aortic sclerosis, diastolic dysfunction  -----------------------------  Tilt table test:  13: + orthostatic induced vasovagal symptoms  ----------------------------  Carotid:  19: sono: 20-39% b/l ICA.  11/15/17: sono: EMI 40-59%, s/p left CEA patent  04/03/15: sono: s/p left CEA, LICA 20-49%, EMI 50-69%  ----------------------------  Cath:  03/16/15: EF 50%, LAD MI, LM distal 40%, LAD prox 95%, mid 80%, D3 50%, LCX prox 50%, distal 90%, LPL ostium 80%, RCA mid 40%, pDA 100% with collaterals from LAD  ----------------------------  Aorto-Iliac:  11/15/17: sono: AAA mid 3.6cm x 3.2cm, RCIA 2.5cm x 2.3cm. LCIA 1.9cm x 1.7cm.  ----------------------------  Upper extremity veins:  16: sono: right: no RUE DVT.
Patient discussed on morning rounds with Dr. Mccormick    Operation / Date: 4/2 TF TAVR jonas valve    Surgeon: Dr. Mccormick    Referring Physician: Dr. Martins    SUBJECTIVE ASSESSMENT:  Feels well, no acute distress.  Denies HA, dizziness, CP, SOB, palpitations.     Hospital Course:  89 y/o male with PMHx of CAD s/p CABG x 5(4/6/2015), chronic systolic CHF (EF 44%), s/p PPM (CRT-P 8/3/16) extracted and replaced on 11/2016, YORDY s/p left CEA (12/2014), PVD and abdominal aortic aneurysm (3.6 x 3.2) followed by a vascular surgeon, BPH, HTN and HLD who presented with BRANDT and fatigue and Class II-III NYHA CHF symptoms. He underwent a cardiac cath which confirmed severe AS with a mean gradient of 41mmhg. He was evaluated for SAVR vs TAVR and was seen by Dr. Mccormick.  On 4/2/19 presented as a SDA for planned TF TAVR with Jonas valve done under conscious sedation.  Procedure uncomplicated.  He had an uneventful recovery in ICU and was transferred to floor care on POD 1.  Post op echo done revealed... He is now clinically stable to be discharged home, tolerating ASA, plavix, and labetalol.  Of note, ingram placed post procedure for urinary retention, proscar and flomax resumed, and patient passed TOV on POD 1.     Vital Signs Last 24 Hrs  T(C): 36.9 (03 Apr 2019 17:14), Max: 37.2 (03 Apr 2019 01:01)  T(F): 98.5 (03 Apr 2019 17:14), Max: 99 (03 Apr 2019 01:01)  HR: 70 (03 Apr 2019 18:50) (70 - 78)  BP: 126/59 (03 Apr 2019 18:50) (126/59 - 148/61)  BP(mean): 93 (03 Apr 2019 18:50) (89 - 93)  RR: 20 (03 Apr 2019 18:50) (10 - 34)  SpO2: 97% (03 Apr 2019 18:50) (95% - 99%)  I&O's Detail    02 Apr 2019 07:01  -  03 Apr 2019 07:00  --------------------------------------------------------  IN:    Albumin 5%  - 250 mL: 750 mL    IV PiggyBack: 150 mL    Oral Fluid: 360 mL    sodium chloride 0.9%.: 80 mL  Total IN: 1340 mL    OUT:    Indwelling Catheter - Urethral: 2175 mL    Voided: 150 mL  Total OUT: 2325 mL    Total NET: -985 mL      03 Apr 2019 07:01  -  03 Apr 2019 19:33  --------------------------------------------------------  IN:    Oral Fluid: 678 mL    sodium chloride 0.9%.: 20 mL  Total IN: 698 mL    OUT:    Indwelling Catheter - Urethral: 85 mL    Voided: 220 mL  Total OUT: 305 mL    Total NET: 393 mL    EPICARDIAL WIRES REMOVED: Yes  TIE DOWNS REMOVED: Yes    PHYSICAL EXAM:    General: OOB in chair, comfortable, NAD    Neurological: A&O x 3 ARGUELLO, no focal deficits    Cardiovascular: S1S2 RRR No M/G/R    Respiratory: CTA b/l No W/R/R    Gastrointestinal: soft, NT/ND    Extremities: No peripheral edema    Vascular: warm and well perfused     Incision Sites: B/L groins soft no palpable hematoma     LABS:                        10.1   7.23  )-----------( 149      ( 03 Apr 2019 02:46 )             31.1       COUMADIN:  No.            PT/INR - ( 03 Apr 2019 02:46 )   PT: 13.6 sec;   INR: 1.20          PTT - ( 03 Apr 2019 02:46 )  PTT:31.6 sec    04-03    141  |  105  |  17  ----------------------------<  99  4.0   |  26  |  1.11    Ca    8.9      03 Apr 2019 02:46  Phos  3.6     04-03  Mg     2.3     04-03    TPro  6.2  /  Alb  3.9  /  TBili  1.6<H>  /  DBili  x   /  AST  24  /  ALT  18  /  AlkPhos  54  04-03      MEDICATIONS  (STANDING):  see med rec    Discharge CXR:   < from: Xray Chest 1 View- PORTABLE-Routine (04.03.19 @ 06:26) >  IMPRESSION: Frontal view of the chest is compared to 4/2/2019 and   demonstrates median sternotomy. Aortic valve replacement. Top normal   heart size. Right-sided permanent pacemaker with lead tips overlying the   right atrium and right ventricle. Interval resolution of redemonstrated   central venous congestion. Small persistent left pleural effusion. No   consolidation.  < end of copied text >    Discharge ECHO:   < from: Echocardiogram (04.03.19 @ 11:36) >  1. Normal left and right ventricular size and systolic function.2. Mild   symmetric left ventricular hypertrophy.3. Regional wall motion   abnormalities   consistent with ischemic heart disease.4. s/p TAVR, 26mm Jonas, normally   functioning.5. Right heart device leads.6. Atrial septal aneurysm.7.   Compared   to the study of 02/12/19 now s/p TAVR.  < end of copied text >
Patient discussed on morning rounds with Dr. Mccormick    Operation / Date: 4/2/19 - TAVR (marianne)    SUBJECTIVE ASSESSMENT:  88y Male resting supine s/p TAVR.  Patient awake/alert, no neuro deficits.  Radial band R wrist, bandages on bilateral groins (venous access R, arterial access L).  No hematoma at this time, groins soft.  Patient denies pain.  Initially hypertensive, responded well to Hydralazine 10mg IV x 1.          Vital Signs Last 24 Hrs  T(C): 36.1 (02 Apr 2019 14:00), Max: 36.4 (02 Apr 2019 06:41)  T(F): 96.9 (02 Apr 2019 14:00), Max: 97.6 (02 Apr 2019 06:41)  HR: 70 (02 Apr 2019 14:00) (70 - 70)  BP: 130/63 (02 Apr 2019 06:41) (130/63 - 130/63)  BP(mean): --  RR: 14 (02 Apr 2019 14:00) (14 - 16)  SpO2: 99% (02 Apr 2019 14:00) (97% - 99%)  I&O's Detail    02 Apr 2019 07:01  -  02 Apr 2019 15:08  --------------------------------------------------------  IN:  Total IN: 0 mL    OUT:    Indwelling Catheter - Urethral: 940 mL    Voided: 150 mL  Total OUT: 1090 mL    Total NET: -1090 mL          CHEST TUBE:  No  SUGEY DRAIN: No.  EPICARDIAL WIRES:No.  TIE DOWNS: No.  BECKFORD: Yes    PHYSICAL EXAM:    General: resting supine, NAD     Neurological: A&O x 3 no deficit     Cardiovascular: RRR no M/R/G    Respiratory: CTA bilaterally     Gastrointestinal: soft, non-tender, non-distended     Extremities: no lower extremity edema     Vascular:  +2 palpable DP/PT bilaterally, R radial band in place, L radial harvest in past from CABG, L brachial line, L hand warm, well perfused     Incision Sites: groin dressings taken down, no hematoma, no oozing, radial band up    LABS:                        11.3   6.50  )-----------( 171      ( 02 Apr 2019 10:45 )             33.9       COUMADIN: NO.    PT/INR - ( 02 Apr 2019 10:45 )   PT: 14.1 sec;   INR: 1.24          PTT - ( 02 Apr 2019 10:45 )  PTT:30.6 sec    04-02    143  |  108  |  18  ----------------------------<  100<H>  3.9   |  24  |  1.00    Ca    9.3      02 Apr 2019 10:45    TPro  6.5  /  Alb  3.8  /  TBili  1.5<H>  /  DBili  x   /  AST  32  /  ALT  27  /  AlkPhos  61  04-02          MEDICATIONS  (STANDING):  atorvastatin 80 milliGRAM(s) Oral at bedtime  ceFAZolin  Injectable. 2000 milliGRAM(s) IV Push every 8 hours  chlorhexidine 0.12% Liquid 5 milliLiter(s) Oral Mucosa every 4 hours  chlorhexidine 2% Cloths 1 Application(s) Topical daily  clopidogrel Tablet 300 milliGRAM(s) Oral once  famotidine Injectable 20 milliGRAM(s) IV Push every 12 hours  finasteride 5 milliGRAM(s) Oral daily  heparin  Injectable 5000 Unit(s) SubCutaneous every 8 hours  levothyroxine 137 MICROGram(s) Oral daily  sodium chloride 0.9%. 1000 milliLiter(s) (10 mL/Hr) IV Continuous <Continuous>  tamsulosin 0.4 milliGRAM(s) Oral at bedtime    MEDICATIONS  (PRN):  hydrALAZINE Injectable 10 milliGRAM(s) IV Push every 4 hours PRN SBP > 160        RADIOLOGY & ADDITIONAL TESTS:
Patient discussed on morning rounds with Dr. Mccormick    Operation / Date: 4/2/19 TF TAVR jonas valve    Surgeon: Dr. Mccormick    Referring Physician: Dr. Finkelstein    SUBJECTIVE ASSESSMENT   88y Male seen and examined bedside. Patient is not offering any complaints at this time. He understands and agrees with the plan for discharge today with ingrma catheter and leg bag. Denies chest pain, SOB, palpitations, hemopytsis, N/V/D, abdominal pain, dizziness, fever or chills. Patient is ambulating, and tolerating PO diet. He has not yet had a BM since surgery, but is passing flatus. He is using IS, pulling 1500cc.     AND HOSPITAL COURSE:  87 y/o male with PMHx of CAD s/p CABG x 5(4/6/2015), chronic systolic CHF (EF 44%), s/p PPM (CRT-P 8/3/16) extracted and replaced on 11/2016, YORDY s/p left CEA (12/2014), PVD and abdominal aortic aneurysm (3.6 x 3.2) followed by a vascular surgeon, BPH, HTN and HLD who presented with BRANDT and fatigue and Class II-III NYHA CHF symptoms. He underwent a cardiac cath which confirmed severe AS with a mean gradient of 41mmhg. He was evaluated for SAVR vs TAVR and was seen by Dr. Mccormick.  On 4/2/19 presented as a SDA for planned TF TAVR with Jonas valve done under conscious sedation.  Procedure uncomplicated.  He had an uneventful recovery in ICU and was transferred to floor care on POD 1.  Post op echo done on 4/3/19 revealed TAVR 26mm Jonas, normally functioning, peak transvalvular velocity is 2.1m/s, mean gradient is 11.8mmHg, LVOT/AV velocity ratio is 0.33, and NATE is 2.4cm2. Of note, ingram was placed post procedure for urinary retention, Proscar and Flomax resumed, he passed trial of voids, however had high post volume residual and ingram was replaced. The plan is that he will go home with a ingram and leg-bag and will follow up for repeat TOV with outpatient urologist. Per Dr. Mccormick, patient is now clinically stable to be discharged home, tolerating ASA, plavix, statin and labetalol.      35 minutes was spent with the patient reviewing the discharge material including medications, follow up appointments, recovery, concerning symptoms, and how to contact their health care providers if they have questions       Vital Signs Last 24 Hrs  T(C): 36.2 (04 Apr 2019 09:41), Max: 36.9 (03 Apr 2019 14:32)  T(F): 97.1 (04 Apr 2019 09:41), Max: 98.5 (03 Apr 2019 17:14)  HR: 70 (04 Apr 2019 09:27) (70 - 78)  BP: 93/57 (04 Apr 2019 09:27) (93/57 - 177/67)  BP(mean): 73 (04 Apr 2019 09:27) (73 - 98)  RR: 17 (04 Apr 2019 09:27) (16 - 20)  SpO2: 97% (04 Apr 2019 09:27) (97% - 98%)    EPICARDIAL WIRES REMOVED: Yes  TIE DOWNS REMOVED: N/a  +Ingram    PHYSICAL EXAM:    General: Patient sitting comfortably in the chair, no acute distress     Neurological: Alert and oriented. No focal neurological deficits     Cardiovascular: S1S2, RRR, no murmurs appreciated on exam     Respiratory: Clear to ausculation bilaterally, no w/r/r, good inspiratory effort.     Gastrointestinal: Abdomen soft, non tender, non distended, +bowel sounds    Extremities: Warm and well perfused. No peripheral edema or calf tenderness     Vascular: Peripheral pulses 2+ b/l.    Incision Sites: B/l groin sites: c/d/i, no hematoma, soft. Right groin site with minimal ecchymosis.     LABS:                        11.4   7.76  )-----------( 133      ( 04 Apr 2019 07:32 )             34.2       COUMADIN: No.     PT/INR - ( 03 Apr 2019 02:46 )   PT: 13.6 sec;   INR: 1.20          PTT - ( 03 Apr 2019 02:46 )  PTT:31.6 sec    04-04    139  |  104  |  18  ----------------------------<  107<H>  4.1   |  25  |  1.04    Ca    9.6      04 Apr 2019 07:32  Phos  2.7     04-04  Mg     2.0     04-04    TPro  6.2  /  Alb  3.9  /  TBili  1.6<H>  /  DBili  x   /  AST  24  /  ALT  18  /  AlkPhos  54  04-03          Discharge CXR: < from: Xray Chest 1 View- PORTABLE-Routine (04.03.19 @ 06:26) >    IMPRESSION: Frontal view of the chest is compared to 4/2/2019 and   demonstrates median sternotomy. Aortic valve replacement. Top normal   heart size. Right-sided permanent pacemaker with lead tips overlying the   right atrium and right ventricle. Interval resolution of redemonstrated   central venous congestion. Small persistent left pleural effusion. No   consolidation.    < end of copied text >      Discharge ECHO:  < from: Echocardiogram (04.03.19 @ 11:36) >    EXAM:  ECHOCARDIOGRAM (CARDIOL)                          *** ADDENDUM 04/03/2019  ***    Patient Height: 172.0 cm  Patient Weight: 79.0 kg  Heart Rate: 70 bpm  Systolic Pressure: 138 mmHg  Diastolic Pressure: 60 mmHg  BSA: 1.9 m^2  Interpretation Summary  1. Normal left and right ventricular size and systolic function.2. Mild   symmetric left ventricular hypertrophy.3. Regional wall motion   abnormalities   consistent with ischemic heart disease.4. s/p TAVR, 26mm Jonas, normally   functioning.5. Right heart device leads.6. Atrial septal aneurysm.7.   Compared   to the study of 02/12/19 now s/p TAVR.    < end of copied text >    < from: Echocardiogram (04.03.19 @ 11:36) >  A prosthetic valve is noted in the aortic position consistent with a 26mm  Jonas TAVR.  Peak transvalvular velocity is 2.1m/s, mean gradient is  11.8mmHg, LVOT/AV velocity ratio is 0.33, and the aortic valve area  (estimated via the continuity equation) is 2.4cm2 consistent with normally  functioning prosthesis.    < end of copied text >

## 2023-10-04 ENCOUNTER — APPOINTMENT (OUTPATIENT)
Dept: HEART AND VASCULAR | Facility: CLINIC | Age: 88
End: 2023-10-04

## 2023-10-05 ENCOUNTER — APPOINTMENT (OUTPATIENT)
Dept: HEART AND VASCULAR | Facility: CLINIC | Age: 88
End: 2023-10-05

## 2023-10-05 PROBLEM — M19.90 UNSPECIFIED OSTEOARTHRITIS, UNSPECIFIED SITE: Chronic | Status: ACTIVE | Noted: 2023-07-10

## 2023-10-05 PROBLEM — E03.9 HYPOTHYROIDISM, UNSPECIFIED: Chronic | Status: ACTIVE | Noted: 2023-07-10

## 2023-10-05 NOTE — ASU PATIENT PROFILE, ADULT - NS PRO TALK SOMEONE YN
Pt here on NV for B 12 injection. Pt tolerated right deltoid injection well. Instructed pt to wait in lobby for 15 mins to monitor for s/s of adverse reactions. Pt v/u    
no

## 2023-11-01 ENCOUNTER — NON-APPOINTMENT (OUTPATIENT)
Age: 88
End: 2023-11-01

## 2023-11-01 ENCOUNTER — APPOINTMENT (OUTPATIENT)
Dept: HEART AND VASCULAR | Facility: CLINIC | Age: 88
End: 2023-11-01
Payer: MEDICARE

## 2023-11-01 PROCEDURE — 93294 REM INTERROG EVL PM/LDLS PM: CPT

## 2023-11-01 PROCEDURE — 93296 REM INTERROG EVL PM/IDS: CPT

## 2024-02-02 ENCOUNTER — NON-APPOINTMENT (OUTPATIENT)
Age: 89
End: 2024-02-02

## 2024-02-02 ENCOUNTER — APPOINTMENT (OUTPATIENT)
Dept: HEART AND VASCULAR | Facility: CLINIC | Age: 89
End: 2024-02-02
Payer: MEDICARE

## 2024-02-02 PROCEDURE — 93294 REM INTERROG EVL PM/LDLS PM: CPT

## 2024-02-02 PROCEDURE — 93296 REM INTERROG EVL PM/IDS: CPT

## 2024-05-02 ENCOUNTER — NON-APPOINTMENT (OUTPATIENT)
Age: 89
End: 2024-05-02

## 2024-05-03 ENCOUNTER — APPOINTMENT (OUTPATIENT)
Dept: HEART AND VASCULAR | Facility: CLINIC | Age: 89
End: 2024-05-03
Payer: MEDICARE

## 2024-05-03 PROCEDURE — 93294 REM INTERROG EVL PM/LDLS PM: CPT

## 2024-05-03 PROCEDURE — 93296 REM INTERROG EVL PM/IDS: CPT

## 2024-07-23 ENCOUNTER — APPOINTMENT (OUTPATIENT)
Dept: HEART AND VASCULAR | Facility: CLINIC | Age: 89
End: 2024-07-23
Payer: MEDICARE

## 2024-07-23 ENCOUNTER — NON-APPOINTMENT (OUTPATIENT)
Age: 89
End: 2024-07-23

## 2024-07-23 VITALS — SYSTOLIC BLOOD PRESSURE: 145 MMHG | DIASTOLIC BLOOD PRESSURE: 78 MMHG

## 2024-07-23 VITALS
SYSTOLIC BLOOD PRESSURE: 170 MMHG | HEIGHT: 65 IN | WEIGHT: 155 LBS | DIASTOLIC BLOOD PRESSURE: 75 MMHG | OXYGEN SATURATION: 97 % | TEMPERATURE: 97.7 F | HEART RATE: 70 BPM | BODY MASS INDEX: 25.83 KG/M2

## 2024-07-23 DIAGNOSIS — I10 ESSENTIAL (PRIMARY) HYPERTENSION: ICD-10-CM

## 2024-07-23 DIAGNOSIS — I50.32 CHRONIC DIASTOLIC (CONGESTIVE) HEART FAILURE: ICD-10-CM

## 2024-07-23 DIAGNOSIS — I73.9 PERIPHERAL VASCULAR DISEASE, UNSPECIFIED: ICD-10-CM

## 2024-07-23 DIAGNOSIS — I35.0 NONRHEUMATIC AORTIC (VALVE) STENOSIS: ICD-10-CM

## 2024-07-23 DIAGNOSIS — I65.29 OCCLUSION AND STENOSIS OF UNSPECIFIED CAROTID ARTERY: ICD-10-CM

## 2024-07-23 DIAGNOSIS — Z95.2 PRESENCE OF PROSTHETIC HEART VALVE: ICD-10-CM

## 2024-07-23 DIAGNOSIS — I71.20 THORACIC AORTIC ANEURYSM, WITHOUT RUPTURE, UNSPECIFIED: ICD-10-CM

## 2024-07-23 DIAGNOSIS — R55 SYNCOPE AND COLLAPSE: ICD-10-CM

## 2024-07-23 DIAGNOSIS — I25.10 ATHEROSCLEROTIC HEART DISEASE OF NATIVE CORONARY ARTERY W/OUT ANGINA PECTORIS: ICD-10-CM

## 2024-07-23 PROCEDURE — 99215 OFFICE O/P EST HI 40 MIN: CPT | Mod: 25

## 2024-07-23 PROCEDURE — 93306 TTE W/DOPPLER COMPLETE: CPT | Mod: 59

## 2024-07-23 PROCEDURE — 93000 ELECTROCARDIOGRAM COMPLETE: CPT

## 2024-07-23 RX ORDER — FUROSEMIDE 20 MG/1
20 TABLET ORAL
Qty: 90 | Refills: 3 | Status: ACTIVE | COMMUNITY

## 2024-07-23 NOTE — ASSESSMENT
[FreeTextEntry1] : ======================================================================================= 1. CAD: s/p CABG x 5 (04/06/15) (Malachi Jose MD), s/p cath 03/26/19: RA mid 50%, distal 80%, RDPA competitive flow from graft, LM 40%, LAD prox 100%, Lcx prox 50%, OM1 fills via graft, OM2 60%, LIMA-LAD patent, radial-LPL patent, SVG-diagonal patent, SVG-OM patent, SVG-RPDA patent, AV MG 41mmHg, s/p CTCA 03/19/19: moderately calcified aortic valve, severe TVD, LIMA-LAD patent, SVG-D1 patent, RA-RPL patent, SVG-OM patent, SVG-RPDA patent,             - continue aspirin 81mg po daily              - will pursue aggressive medical management             - will follow left ventricular function with serial echocardiogram   2. Chronic diastolic heart failure: EF improved: s/p echo: 07/23/24: EF 51%, mild LVH, hypokinesis basal and mid inferior and basal inferolateral walls, grade I diastolic dysfunction, 26mm Jonas TAVR, mild paravalvular AI, PPM, atrial septal aneurysm, mildly dilated LA and RA:             - continue prn loop diuretics, furosemide 20mg po daily prn (reduced from 80mg secondary to symptomatic hypotension)             - discussed with patient importance of maintaining a weight log, avoidance of dietary sodium, as well as appropriate use of loop diuretics             - will follow with serial echocardiograms  3. Carotid stenosis: s/p left CEA 12/14/04 (Kareem Swain MD), 11/17/21: sono: EMI 40-59%, left CEA patent             - will monitor with periodic carotid sonograms (performed at the vascular surgeon's office)             - continue statin and anti-platelet agent             - discussed with patient surveillance for neurologic symptoms  4. Aortic stenosis: s/p successful 26mm Jonas III TAVR 04/09/19 (John Mccormick MD): s/p echo: 07/23/24: EF 51%, mild LVH, hypokinesis basal and mid inferior and basal inferolateral walls, grade I diastolic dysfunction, 26mm Jonas TAVR, mild paravalvular AI, PPM, atrial septal aneurysm, mildly dilated LA and RA:               - will follow with serial echocardiograms    5. 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 HTN: BP not at ACC/AHA 2017 guideline target, ? occasional low reading on ambulatory log, ? symptomatic             - continue labetalol 300mg 1 tab po qam and 0.5 tab po qpm             - continue valsartan 320mg po qd              - if BP remains above target next visit will up titrate anti-HTN regimen   7. BPH (benign prostatic hyperplasia): urinary retention post TAVR:              - continue finasteride 5mg po qd             - continue tamsulosin 0.4mg po qd              - f/u with urologist   8. Presence of cardiac pacemaker: s/p CRT-P, upgraded from PPM 09/2016 complicated by hematoma requiring device extraction and replacement on the right side (11/2016):              - continue follow up with device clinic   9. Dyslipidemia:             - continue atorvastatin 80mg po daily            - patient will provide copy of recent lab work from PMD's office for my review   10. Lower extremity edema: likely secondary to chronic diastolic HF and venous insufficiency:            - continue furosemide 20mg po qd prn (80mg resulted in symptomtic hypotension)   I spent > 45 minutes of total time on this visit, including time spent face-to-face and non-face-to-face.  During this time, I took a relevant history and examined the patient.  I reviewed relevant portions of the medical record and formulated a differential diagnosis and plan.  I explained the relevant cardiac diagnoses to the patient, as well as the work up and management plan.  I answered all questions related to the patient's cardiac conditions.

## 2024-07-23 NOTE — REASON FOR VISIT
[Other: ____] : [unfilled] [FreeTextEntry1] : ======================================================================================= Diagnostic Tests: -------------------------------------------------------------- EC23: A-paced, Bi-V paced.   21: A-paced, Bi-V paced.   21: A-paced, Bi-V paced.  20: A-paced, V-paced.  19: A-sensed, V-paced at 70 bpm. 19: A-sensed, V-paced at 70 bpm.  18: A-sensed, V-paced at 70 bpm.  16: A-sensed, V-paced at 62 BPM.  03/24/15: A-paced, V-paced at 64bpm 13: A-paced, V-sensed, RBBB, LAFB, prolonged A paced to V-sensed delay (288ms) -------------------------------------------------------------- CTCA: 19: moderately calcified aortic valve, severe TVD, LIMA-LAD patent, SVG-D1 patent, RA-RPL patent, SVG-OM patent, SVG-RPDA patent.  02/24/15: Ca+ score 1027.41 (75th percentile), LM distal 50%, LAD prox 75%, LCX moderate, RCA moderate, PDA 50%, EF 56%, no RWMA -------------------------------------------------------------- Stress: 20: regadenoson MPI: EF 59%, normal perfusion.  10/04/17: regadenoson MPI: EF 47%, normal perfusion. 03/03/15: exercise MPI: 7 METS, EF 54%, fixed inferior and inferoseptal defects 12: exercise MPI: 7 METS, mild inferior and inferoseptal scar, no ischemia, EF 54%, no RWMA 09: exercise MPI: 7 METS, mild inferior and inferoseptal scar, no ischemia, EF 54%, no RWMA -------------------------------------------------------------- Echo: 24: EF 51%, mild LVH, hypokinesis basal and mid inferior and basal inferolateral walls, grade I diastolic dysfunction, 26mm Jonas TAVR, mild paravalvular AI, PPM, atrial septal aneurysm, mildly dilated LA and RA.   21: EF 52%, hypokinesis basal and mid inferior and basal inferolateral walls, grade I diastolic dysfunction, 26mm Jonas TAVR, mild-moderate paravalvular AI, PPM, LV GLS -14.5%, atrial septal aneurysm, severely dilated LA, dilated RA.   20: EF 49%, hypokinesis basal and mid inferior and basal inferolateral walls, grade I diastolic dysfunction, 26mm Jonas TAVR, mild-moderate paravalvular AI, PPM. 19: EF 55%, LVH, dilated LA, s/p TAVR normally functioning, mild TR.  19: EF 54%, LVH, akinesis basal and mid IL, PPM, s/p 26mm TAVR (PV 2.1m/s, MG 11.8mmHg, LVOT/AV 0.33, NATE 2.4cm2).  19: E 44%, akinesis basal/mid IL, LVH, dilated LA/RA, normal RV function, severe AS (NATE 0.75cm2, PV 3.6m/s, MG 31mmHg, LVOT/AV 0.25, SVI 22ml/m2), PPM, atrial septal aneurysm. 10/05/17: EF 38%, grade I diastolic dysfunction, reduced RV function, akinesis apical septum and basal/mid inferolateral walls, severe AS, NATE 0.8cm2, LVOT/AV 0.23, mean gradient 23mmHg.  16: EF 36%, akinesis apical anterior, apical inferior and apical walls, moderate AS, NATE 1.2cm2, moderate TR, mild MR, LVH 04/02/15: EF 41%, mid LAD MI, mild AS, NATE 1.7cm2, grade I diastolic dysfunction, dilated LA, mild TR, PASP 36mmHg 12: EF 55%, mild TR, diastolic dysfunction 09: EF 55%, aortic sclerosis, diastolic dysfunction -------------------------------------------------------------- Tilt table test: 13: + orthostatic induced vasovagal symptoms -------------------------------------------------------------- Carotid: 21: sono: EMI 40-59% stenosis, LICA patent CEA.  10/14/20: sono: EMI 40-59% stenosis, LICA patent CEA.  19: sono: EMI 40-59% stenosis, LICA patent CEA.  19: sono: 20-39% b/l ICA. 11/15/17: sono: EMI 40-59%, s/p left CEA patent 04/03/15: sono: s/p left CEA, LICA 20-49%, EMI 50-69% -------------------------------------------------------------- Cath: 19: successful 26mm Jonas III TAVR 19: RA mid 50%, distal 80%, RDPA competitive flow from graft, LM 40%, LAD prox 100%, Lcx prox 50%, OM1 fills via graft, OM2 60%, LIMA-LAD patent, radial-LPL patent, SVG-diagonal patent, SVG-OM patent, SVG-RPDA patent, AV MG 41mmHg.  03/16/15: EF 50%, LAD MI, LM distal 40%, LAD prox 95%, mid 80%, D3 50%, LCX prox 50%, distal 90%, LPL ostium 80%, RCA mid 40%, pDA 100% with collaterals from LAD -------------------------------------------------------------- Aorta-Iliac: 23: sono: mid abdominal aorta aneurysm 4.19cm x 4.28cm, EMI aneurysm 2.98cm, LICA 2.39cm. 21: sono: mid abdominal aorta aneurysm 4.26cm x 4.25cm, EMI aneurysm 2.83cm, LICA 1.83cm.  21: sono: mid abdominal aorta aneurysm 3.99cm x 3.97cm, RCIA aneurysm 2.67cm, LICA 1.83cm.  10/14/20: sono: mid abdominal aorta aneurysm 3.89cm x 3.85cm, RCIA aneurysm 2.58cm x 2.47cm.  19: sono: mid abdominal aorta aneurysm 3.98cm x 3.88cm, RCIA aneurysm 2.54cm x 2.47cm.  11/15/17: sono: AAA mid 3.6cm x 3.2cm, RCIA 2.5cm x 2.3cm. LCIA 1.9cm x 1.7cm. -------------------------------------------------------------- Upper extremity veins: 16: sono: right: no RUE DVT. -------------------------------------------------------------- Lower extremity veins: 23: sono: right: no DVT.

## 2024-07-23 NOTE — ASSESSMENT
[FreeTextEntry1] : ======================================================================================= 1. CAD: s/p CABG x 5 (04/06/15) (Malachi Jose MD), s/p cath 03/26/19: RA mid 50%, distal 80%, RDPA competitive flow from graft, LM 40%, LAD prox 100%, Lcx prox 50%, OM1 fills via graft, OM2 60%, LIMA-LAD patent, radial-LPL patent, SVG-diagonal patent, SVG-OM patent, SVG-RPDA patent, AV MG 41mmHg, s/p CTCA 03/19/19: moderately calcified aortic valve, severe TVD, LIMA-LAD patent, SVG-D1 patent, RA-RPL patent, SVG-OM patent, SVG-RPDA patent,             - continue aspirin 81mg po daily              - will pursue aggressive medical management             - will follow left ventricular function with serial echocardiogram   2. Chronic diastolic heart failure: EF improved: s/p echo: 07/23/24: EF 51%, mild LVH, hypokinesis basal and mid inferior and basal inferolateral walls, grade I diastolic dysfunction, 26mm Jonas TAVR, mild paravalvular AI, PPM, atrial septal aneurysm, mildly dilated LA and RA:             - continue prn loop diuretics, furosemide 20mg po daily prn (reduced from 80mg secondary to symptomatic hypotension)             - discussed with patient importance of maintaining a weight log, avoidance of dietary sodium, as well as appropriate use of loop diuretics             - will follow with serial echocardiograms  3. Carotid stenosis: s/p left CEA 12/14/04 (Kareem Swain MD), 11/17/21: sono: EMI 40-59%, left CEA patent             - will monitor with periodic carotid sonograms (performed at the vascular surgeon's office)             - continue statin and anti-platelet agent             - discussed with patient surveillance for neurologic symptoms  4. Aortic stenosis: s/p successful 26mm Jonas III TAVR 04/09/19 (John Mccormick MD): s/p echo: 07/23/24: EF 51%, mild LVH, hypokinesis basal and mid inferior and basal inferolateral walls, grade I diastolic dysfunction, 26mm Jonas TAVR, mild paravalvular AI, PPM, atrial septal aneurysm, mildly dilated LA and RA:               - will follow with serial echocardiograms    5. 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 HTN: BP not at ACC/AHA 2017 guideline target, ? occasional low reading on ambulatory log, ? symptomatic             - continue labetalol 300mg 1 tab po qam and 0.5 tab po qpm             - continue valsartan 320mg po qd              - if BP remains above target next visit will up titrate anti-HTN regimen   7. BPH (benign prostatic hyperplasia): urinary retention post TAVR:              - continue finasteride 5mg po qd             - continue tamsulosin 0.4mg po qd              - f/u with urologist   8. Presence of cardiac pacemaker: s/p CRT-P, upgraded from PPM 09/2016 complicated by hematoma requiring device extraction and replacement on the right side (11/2016):              - continue follow up with device clinic   9. Dyslipidemia:             - continue atorvastatin 80mg po daily            - patient will provide copy of recent lab work from PMD's office for my review   10. Lower extremity edema: likely secondary to chronic diastolic HF and venous insufficiency:            - continue furosemide 20mg po qd prn (80mg resulted in symptomtic hypotension)   I spent > 45 minutes of total time on this visit, including time spent face-to-face and non-face-to-face.  During this time, I took a relevant history and examined the patient.  I reviewed relevant portions of the medical record and formulated a differential diagnosis and plan.  I explained the relevant cardiac diagnoses to the patient, as well as the work up and management plan.  I answered all questions related to the patient's cardiac conditions.

## 2024-07-23 NOTE — HISTORY OF PRESENT ILLNESS
[FreeTextEntry1] : Mr. Mays presents for follow up and management of HTN, dyslipidemia, CAD s/p CABG x 5 (04/06/15), chronic systolic heart failure HFiEF), s/p PPM '16 upgraded to CRT-P 11/2016, aortic stenosis s/p TAVR 04/02/19, YORDY s/p left CEA (2004), and AAA and RCIA aneurysm (s/p EVAR 06/12/23). He underwent 5-vessel coronary artery bypass grafting on 04/06/15. His pacemaker was upgraded to a CRT-P on 08/03/16. He had bleeding into the pocket and had pocket revision on 09/28/16. He was evaluated by Antonio Bustillo MD (general surgeon) at [facility] to address wound healing over the device. In 2017, in the office I examined the device wound and noted the wound edges not to be opposed and the device to be partially exposed. He went on to have the device extracted and replaced (11/2016) (Osito Alicea MD).  He underwent successful TAVR (26 mm Jonas) on 04/02/19 with John Mccormick MD.  On 03/19/19 he had a CCTA which revealed moderately calcified aortic valve, severe TVD, LIMA-LAD patent, SVG-D1 patent, RA-RPL patent, SVG-OM patent, and SVG-RPDA patent. On 11/17/22, he called to inform me that, in July, he sustained a hip fracture which required surgical repair at [facility].  Of note, he cares for his wife who has dementia. I discussed seeing a cardiologist in the [facility] whose office is in [place] (Supreeti Behuria, MD, or Homer Lee DO) given his difficulty commuting. On 06/12/23, he underwent EVAR of an abdominal aortic and right SLADE aneurysm.  He was found to have a type 2 endoleak (resulting from collateral retrograde flow from the aortic branches, usually from the lumbar arteries, inferior mesenteric artery, or middle sacral artery) which has been managed conservatively.   Of note, he is taking furosemide 20mg po qd instead of 80mg and labetalol 300mg q am and 150mg po qpm.  Today, 07/23/24, she had an echocardiogram which revealed an EF 51%, mild LVH, hypokinesis basal and mid inferior and basal inferolateral walls, grade I diastolic dysfunction, 26mm Jonas TAVR, mild paravalvular AI, PPM, atrial septal aneurysm, and mildly dilated LA and RA.

## 2024-07-23 NOTE — PHYSICAL EXAM
[Eyelids - No Xanthelasma] : the eyelids demonstrated no xanthelasmas [Normal Conjunctiva] : the conjunctiva exhibited no abnormalities [Normal Oral Mucosa] : normal oral mucosa [No Oral Pallor] : no oral pallor [No Oral Cyanosis] : no oral cyanosis [Normal Jugular Venous A Waves Present] : normal jugular venous A waves present [Normal Jugular Venous V Waves Present] : normal jugular venous V waves present [No Jugular Venous Bella A Waves] : no jugular venous bella A waves [Heart Sounds] : normal S1 and S2 [Heart Rate And Rhythm] : heart rate and rhythm were normal [Murmurs] : no murmurs present [Abdomen Soft] : soft [Bowel Sounds] : normal bowel sounds [Abdomen Tenderness] : non-tender [Abdomen Mass (___ Cm)] : no abdominal mass palpated [Abnormal Walk] : normal gait [Gait - Sufficient For Exercise Testing] : the gait was sufficient for exercise testing [Nail Clubbing] : no clubbing of the fingernails [Cyanosis, Localized] : no localized cyanosis [Petechial Hemorrhages (___cm)] : no petechial hemorrhages [Skin Color & Pigmentation] : normal skin color and pigmentation [No Venous Stasis] : no venous stasis [Skin Lesions] : no skin lesions [No Skin Ulcers] : no skin ulcer [No Xanthoma] : no  xanthoma was observed [Oriented To Time, Place, And Person] : oriented to person, place, and time [Affect] : the affect was normal [Mood] : the mood was normal [No Anxiety] : not feeling anxious [General Appearance - Well Developed] : well developed [Normal Appearance] : normal appearance [Well Groomed] : well groomed [General Appearance - Well Nourished] : well nourished [No Deformities] : no deformities [General Appearance - In No Acute Distress] : no acute distress [] : no respiratory distress [Respiration, Rhythm And Depth] : normal respiratory rhythm and effort [Exaggerated Use Of Accessory Muscles For Inspiration] : no accessory muscle use [Auscultation Breath Sounds / Voice Sounds] : lungs were clear to auscultation bilaterally [FreeTextEntry1] : + multiple ecchymosis

## 2024-07-23 NOTE — HISTORY OF PRESENT ILLNESS
[FreeTextEntry1] : Mr. Mays presents for follow up and management of HTN, dyslipidemia, CAD s/p CABG x 5 (04/06/15), chronic systolic heart failure HFiEF), s/p PPM '16 upgraded to CRT-P 11/2016, aortic stenosis s/p TAVR 04/02/19, YORDY s/p left CEA (2004), and AAA and RCIA aneurysm (s/p EVAR 06/12/23). He underwent 5-vessel coronary artery bypass grafting on 04/06/15. His pacemaker was upgraded to a CRT-P on 08/03/16. He had bleeding into the pocket and had pocket revision on 09/28/16. He was evaluated by Antonio Bustillo MD (general surgeon) at [facility] to address wound healing over the device. In 2017, in the office I examined the device wound and noted the wound edges not to be opposed and the device to be partially exposed. He went on to have the device extracted and replaced (11/2016) (Osito Alicea MD).  He underwent successful TAVR (26 mm Jonas) on 04/02/19 with John Mccormick MD.  On 03/19/19 he had a CCTA which revealed moderately calcified aortic valve, severe TVD, LIMA-LAD patent, SVG-D1 patent, RA-RPL patent, SVG-OM patent, and SVG-RPDA patent. On 11/17/22, he called to inform me that, in July, he sustained a hip fracture which required surgical repair at [facility].  Of note, he cares for his wife who has dementia. I discussed seeing a cardiologist in the [facility] whose office is in [place] (Supreeti Behuria, MD, or Homer Lee DO) given his difficulty commuting. On 06/12/23, he underwent EVAR of an abdominal aortic and right SLADE aneurysm.  He was found to have a type 2 endoleak (resulting from collateral retrograde flow from the aortic branches, usually from the lumbar arteries, inferior mesenteric artery, or middle sacral artery) which has been managed conservatively.   Of note, he is taking furosemide 20mg po qd instead of 80mg and labetalol 300mg q am and 150mg po qpm.  Today, 07/23/24, she had an echocardiogram which revealed an EF 51%, mild LVH, hypokinesis basal and mid inferior and basal inferolateral walls, grade I diastolic dysfunction, 26mm Jonas TAVR, mild paravalvular AI, PPM, atrial septal aneurysm, and mildly dilated LA and RA.

## 2024-07-23 NOTE — PHYSICAL EXAM
[Normal Conjunctiva] : the conjunctiva exhibited no abnormalities [Eyelids - No Xanthelasma] : the eyelids demonstrated no xanthelasmas [Normal Oral Mucosa] : normal oral mucosa [No Oral Pallor] : no oral pallor [No Oral Cyanosis] : no oral cyanosis [Normal Jugular Venous A Waves Present] : normal jugular venous A waves present [Normal Jugular Venous V Waves Present] : normal jugular venous V waves present [No Jugular Venous Bella A Waves] : no jugular venous bella A waves [Heart Rate And Rhythm] : heart rate and rhythm were normal [Heart Sounds] : normal S1 and S2 [Murmurs] : no murmurs present [Abdomen Soft] : soft [Bowel Sounds] : normal bowel sounds [Abdomen Tenderness] : non-tender [Abdomen Mass (___ Cm)] : no abdominal mass palpated [Abnormal Walk] : normal gait [Gait - Sufficient For Exercise Testing] : the gait was sufficient for exercise testing [Nail Clubbing] : no clubbing of the fingernails [Cyanosis, Localized] : no localized cyanosis [Petechial Hemorrhages (___cm)] : no petechial hemorrhages [Skin Color & Pigmentation] : normal skin color and pigmentation [No Venous Stasis] : no venous stasis [Skin Lesions] : no skin lesions [No Skin Ulcers] : no skin ulcer [No Xanthoma] : no  xanthoma was observed [Oriented To Time, Place, And Person] : oriented to person, place, and time [Affect] : the affect was normal [Mood] : the mood was normal [No Anxiety] : not feeling anxious [General Appearance - Well Developed] : well developed [Normal Appearance] : normal appearance [Well Groomed] : well groomed [General Appearance - Well Nourished] : well nourished [No Deformities] : no deformities [General Appearance - In No Acute Distress] : no acute distress [] : no respiratory distress [Respiration, Rhythm And Depth] : normal respiratory rhythm and effort [Exaggerated Use Of Accessory Muscles For Inspiration] : no accessory muscle use [Auscultation Breath Sounds / Voice Sounds] : lungs were clear to auscultation bilaterally [FreeTextEntry1] : + multiple ecchymosis

## 2024-08-05 ENCOUNTER — NON-APPOINTMENT (OUTPATIENT)
Age: 89
End: 2024-08-05

## 2024-08-06 ENCOUNTER — APPOINTMENT (OUTPATIENT)
Dept: HEART AND VASCULAR | Facility: CLINIC | Age: 89
End: 2024-08-06

## 2024-08-06 PROCEDURE — 93294 REM INTERROG EVL PM/LDLS PM: CPT

## 2024-08-06 PROCEDURE — 93296 REM INTERROG EVL PM/IDS: CPT

## 2024-09-16 ENCOUNTER — APPOINTMENT (OUTPATIENT)
Dept: HEART AND VASCULAR | Facility: CLINIC | Age: 89
End: 2024-09-16
Payer: MEDICARE

## 2024-09-16 VITALS
TEMPERATURE: 98.2 F | HEIGHT: 65 IN | SYSTOLIC BLOOD PRESSURE: 172 MMHG | DIASTOLIC BLOOD PRESSURE: 72 MMHG | BODY MASS INDEX: 25.83 KG/M2 | OXYGEN SATURATION: 98 % | WEIGHT: 155 LBS | HEART RATE: 70 BPM

## 2024-09-16 VITALS — HEART RATE: 70 BPM | DIASTOLIC BLOOD PRESSURE: 79 MMHG | SYSTOLIC BLOOD PRESSURE: 171 MMHG

## 2024-09-16 PROCEDURE — 93281 PM DEVICE PROGR EVAL MULTI: CPT

## 2024-09-16 NOTE — DISCUSSION/SUMMARY
[FreeTextEntry1] : Mr. Mays is a pleasant 89 year-old gentleman with a past medical history significant for HTN, HLD, CAD s/p CABG x 5 (04/06/15), YORDY s/p left CEA, AS s/p TAVR 4/2/19, HFrEF and biventricular pacemaker who presents to establish EP care today.  The device is functioning appropriately as programmed and all measured data is WNL.  The patient is enrolled in remote monitoring and was asked to return for follow-up in six months. He will follow up in 6 months at which time we will discuss gen change.

## 2024-09-16 NOTE — PROCEDURE
[CRT-P] : Cardiac resynchronization therapy pacemaker [DDDR] : DDDR [Lead Imp:  ___ohms] : lead impedance was [unfilled] ohms [Sensing Amplitude ___mv] : sensing amplitude was [unfilled] mv [___V @] : [unfilled] V [___ ms] : [unfilled] ms [de-identified] : [facility] [de-identified] : 70 [de-identified] : 4.5 years to [facility]  [de-identified] : AP 98\par  \par  \par  Increased RV output to 2.5V @ 0.5 ms for adequate safey margin

## 2024-09-16 NOTE — PROCEDURE
[CRT-P] : Cardiac resynchronization therapy pacemaker [DDDR] : DDDR [Lead Imp:  ___ohms] : lead impedance was [unfilled] ohms [Sensing Amplitude ___mv] : sensing amplitude was [unfilled] mv [___V @] : [unfilled] V [___ ms] : [unfilled] ms [de-identified] : [facility] [de-identified] : 70 [de-identified] : 4.5 years to [facility]  [de-identified] : AP 98\par  \par  \par  Increased RV output to 2.5V @ 0.5 ms for adequate safey margin

## 2024-09-16 NOTE — HISTORY OF PRESENT ILLNESS
[de-identified] : Mr. Mays is a pleasant 89 year-old gentleman with a past medical history significant for HTN, HLD, CAD s/p CABG x 5 (04/06/15), YORDY s/p left CEA, AS s/p TAVR 4/2/19, HFrEF and biventricular pacemaker who presents for follow up. His initial pacemaker was implanted on the left side in 2016 had bleeding in the pocket and he underwent a revision.  He then had wound dehiscence and the system was extracted and reimplanted on the right side.  He denies any further issues with the pacemaker since that time and otherwise feels well.   Interroation reveals 1 year of battery. Atrial sensing 2.4. Impendance 489 / 561 / 589. Threshold 1.0 / 1.0 / 1.7.

## 2024-09-16 NOTE — HISTORY OF PRESENT ILLNESS
[de-identified] : Mr. Mays is a pleasant 89 year-old gentleman with a past medical history significant for HTN, HLD, CAD s/p CABG x 5 (04/06/15), YORDY s/p left CEA, AS s/p TAVR 4/2/19, HFrEF and biventricular pacemaker who presents for follow up. His initial pacemaker was implanted on the left side in 2016 had bleeding in the pocket and he underwent a revision.  He then had wound dehiscence and the system was extracted and reimplanted on the right side.  He denies any further issues with the pacemaker since that time and otherwise feels well.   Interroation reveals 1 year of battery. Atrial sensing 2.4. Impendance 489 / 561 / 589. Threshold 1.0 / 1.0 / 1.7.

## 2024-12-18 ENCOUNTER — APPOINTMENT (OUTPATIENT)
Dept: HEART AND VASCULAR | Facility: CLINIC | Age: 88
End: 2024-12-18

## 2024-12-18 PROCEDURE — 93296 REM INTERROG EVL PM/IDS: CPT

## 2024-12-18 PROCEDURE — 93294 REM INTERROG EVL PM/LDLS PM: CPT

## 2025-02-18 ENCOUNTER — APPOINTMENT (OUTPATIENT)
Dept: PODIATRY | Facility: HOME HEALTH | Age: 89
End: 2025-02-18
Payer: MEDICARE

## 2025-02-18 DIAGNOSIS — M20.41 OTHER HAMMER TOE(S) (ACQUIRED), RIGHT FOOT: ICD-10-CM

## 2025-02-18 DIAGNOSIS — M79.672 PAIN IN LEFT FOOT: ICD-10-CM

## 2025-02-18 DIAGNOSIS — M79.671 PAIN IN RIGHT FOOT: ICD-10-CM

## 2025-02-18 DIAGNOSIS — L84 CORNS AND CALLOSITIES: ICD-10-CM

## 2025-02-18 DIAGNOSIS — M20.42 OTHER HAMMER TOE(S) (ACQUIRED), LEFT FOOT: ICD-10-CM

## 2025-02-18 DIAGNOSIS — B35.3 TINEA PEDIS: ICD-10-CM

## 2025-02-18 DIAGNOSIS — B35.1 TINEA UNGUIUM: ICD-10-CM

## 2025-02-18 PROCEDURE — 11721 DEBRIDE NAIL 6 OR MORE: CPT | Mod: 59

## 2025-02-18 PROCEDURE — 99347 HOME/RES VST EST SF MDM 20: CPT | Mod: 25

## 2025-02-18 PROCEDURE — 11056 PARNG/CUTG B9 HYPRKR LES 2-4: CPT

## 2025-04-04 ENCOUNTER — APPOINTMENT (OUTPATIENT)
Dept: HEART AND VASCULAR | Facility: CLINIC | Age: 89
End: 2025-04-04
Payer: MEDICARE

## 2025-04-04 VITALS
OXYGEN SATURATION: 95 % | HEIGHT: 65 IN | BODY MASS INDEX: 25.66 KG/M2 | SYSTOLIC BLOOD PRESSURE: 135 MMHG | DIASTOLIC BLOOD PRESSURE: 68 MMHG | WEIGHT: 154 LBS | HEART RATE: 70 BPM

## 2025-04-04 PROCEDURE — 93283 PRGRMG EVAL IMPLANTABLE DFB: CPT

## 2025-04-07 ENCOUNTER — APPOINTMENT (OUTPATIENT)
Dept: HEART AND VASCULAR | Facility: CLINIC | Age: 89
End: 2025-04-07

## 2025-04-07 ENCOUNTER — NON-APPOINTMENT (OUTPATIENT)
Age: 89
End: 2025-04-07

## 2025-04-07 ENCOUNTER — APPOINTMENT (OUTPATIENT)
Dept: HEART AND VASCULAR | Facility: CLINIC | Age: 89
End: 2025-04-07
Payer: MEDICARE

## 2025-04-07 VITALS
WEIGHT: 155 LBS | OXYGEN SATURATION: 98 % | SYSTOLIC BLOOD PRESSURE: 171 MMHG | BODY MASS INDEX: 25.83 KG/M2 | HEIGHT: 65 IN | HEART RATE: 69 BPM | TEMPERATURE: 97.6 F | DIASTOLIC BLOOD PRESSURE: 76 MMHG

## 2025-04-07 VITALS
WEIGHT: 155 LBS | BODY MASS INDEX: 30.43 KG/M2 | DIASTOLIC BLOOD PRESSURE: 74 MMHG | SYSTOLIC BLOOD PRESSURE: 169 MMHG | TEMPERATURE: 97.8 F | HEIGHT: 60 IN | OXYGEN SATURATION: 97 % | HEART RATE: 69 BPM

## 2025-04-07 VITALS — SYSTOLIC BLOOD PRESSURE: 169 MMHG | DIASTOLIC BLOOD PRESSURE: 85 MMHG

## 2025-04-07 DIAGNOSIS — I25.10 ATHEROSCLEROTIC HEART DISEASE OF NATIVE CORONARY ARTERY W/OUT ANGINA PECTORIS: ICD-10-CM

## 2025-04-07 DIAGNOSIS — I71.20 THORACIC AORTIC ANEURYSM, WITHOUT RUPTURE, UNSPECIFIED: ICD-10-CM

## 2025-04-07 DIAGNOSIS — R55 SYNCOPE AND COLLAPSE: ICD-10-CM

## 2025-04-07 DIAGNOSIS — I73.9 PERIPHERAL VASCULAR DISEASE, UNSPECIFIED: ICD-10-CM

## 2025-04-07 DIAGNOSIS — I35.0 NONRHEUMATIC AORTIC (VALVE) STENOSIS: ICD-10-CM

## 2025-04-07 DIAGNOSIS — I10 ESSENTIAL (PRIMARY) HYPERTENSION: ICD-10-CM

## 2025-04-07 DIAGNOSIS — Z95.2 PRESENCE OF PROSTHETIC HEART VALVE: ICD-10-CM

## 2025-04-07 DIAGNOSIS — I65.29 OCCLUSION AND STENOSIS OF UNSPECIFIED CAROTID ARTERY: ICD-10-CM

## 2025-04-07 DIAGNOSIS — I50.32 CHRONIC DIASTOLIC (CONGESTIVE) HEART FAILURE: ICD-10-CM

## 2025-04-07 PROCEDURE — 99215 OFFICE O/P EST HI 40 MIN: CPT | Mod: 25

## 2025-04-07 PROCEDURE — 93306 TTE W/DOPPLER COMPLETE: CPT | Mod: 59

## 2025-04-07 PROCEDURE — 93000 ELECTROCARDIOGRAM COMPLETE: CPT

## 2025-04-22 ENCOUNTER — APPOINTMENT (OUTPATIENT)
Dept: PODIATRY | Facility: HOME HEALTH | Age: 89
End: 2025-04-22
Payer: MEDICARE

## 2025-04-22 DIAGNOSIS — I73.9 PERIPHERAL VASCULAR DISEASE, UNSPECIFIED: ICD-10-CM

## 2025-04-22 DIAGNOSIS — B35.1 TINEA UNGUIUM: ICD-10-CM

## 2025-04-22 DIAGNOSIS — M79.672 PAIN IN LEFT FOOT: ICD-10-CM

## 2025-04-22 DIAGNOSIS — M20.41 OTHER HAMMER TOE(S) (ACQUIRED), RIGHT FOOT: ICD-10-CM

## 2025-04-22 DIAGNOSIS — B35.3 TINEA PEDIS: ICD-10-CM

## 2025-04-22 DIAGNOSIS — M20.42 OTHER HAMMER TOE(S) (ACQUIRED), LEFT FOOT: ICD-10-CM

## 2025-04-22 DIAGNOSIS — M79.671 PAIN IN RIGHT FOOT: ICD-10-CM

## 2025-04-22 PROCEDURE — 99347 HOME/RES VST EST SF MDM 20: CPT | Mod: 25

## 2025-04-22 PROCEDURE — 11721 DEBRIDE NAIL 6 OR MORE: CPT

## 2025-06-13 ENCOUNTER — APPOINTMENT (OUTPATIENT)
Dept: HEART AND VASCULAR | Facility: CLINIC | Age: 89
End: 2025-06-13

## 2025-06-13 PROCEDURE — 93294 REM INTERROG EVL PM/LDLS PM: CPT

## 2025-06-13 PROCEDURE — 93296 REM INTERROG EVL PM/IDS: CPT

## 2025-06-24 ENCOUNTER — APPOINTMENT (OUTPATIENT)
Dept: PODIATRY | Facility: HOME HEALTH | Age: 89
End: 2025-06-24
Payer: MEDICARE

## 2025-06-24 PROCEDURE — 99347 HOME/RES VST EST SF MDM 20: CPT | Mod: 25

## 2025-06-24 PROCEDURE — 11721 DEBRIDE NAIL 6 OR MORE: CPT | Mod: 59,Q9

## 2025-06-24 PROCEDURE — 11056 PARNG/CUTG B9 HYPRKR LES 2-4: CPT | Mod: Q9

## 2025-07-11 ENCOUNTER — APPOINTMENT (OUTPATIENT)
Dept: HEART AND VASCULAR | Facility: CLINIC | Age: 89
End: 2025-07-11

## 2025-07-11 VITALS
DIASTOLIC BLOOD PRESSURE: 56 MMHG | HEIGHT: 60 IN | BODY MASS INDEX: 30.82 KG/M2 | OXYGEN SATURATION: 93 % | WEIGHT: 157 LBS | SYSTOLIC BLOOD PRESSURE: 140 MMHG | HEART RATE: 70 BPM

## 2025-09-19 ENCOUNTER — APPOINTMENT (OUTPATIENT)
Dept: PODIATRY | Facility: HOME HEALTH | Age: 89
End: 2025-09-19
Payer: MEDICARE

## 2025-09-19 DIAGNOSIS — M20.41 OTHER HAMMER TOE(S) (ACQUIRED), RIGHT FOOT: ICD-10-CM

## 2025-09-19 DIAGNOSIS — B35.1 TINEA UNGUIUM: ICD-10-CM

## 2025-09-19 DIAGNOSIS — I73.9 PERIPHERAL VASCULAR DISEASE, UNSPECIFIED: ICD-10-CM

## 2025-09-19 DIAGNOSIS — M20.42 OTHER HAMMER TOE(S) (ACQUIRED), LEFT FOOT: ICD-10-CM

## 2025-09-19 DIAGNOSIS — B35.3 TINEA PEDIS: ICD-10-CM

## 2025-09-19 DIAGNOSIS — M79.672 PAIN IN LEFT FOOT: ICD-10-CM

## 2025-09-19 DIAGNOSIS — L60.2 ONYCHOGRYPHOSIS: ICD-10-CM

## 2025-09-19 DIAGNOSIS — M79.671 PAIN IN RIGHT FOOT: ICD-10-CM

## 2025-09-19 PROCEDURE — 99347 HOME/RES VST EST SF MDM 20: CPT | Mod: 25

## 2025-09-19 PROCEDURE — 11721 DEBRIDE NAIL 6 OR MORE: CPT | Mod: Q9

## 2025-09-25 PROBLEM — L60.2 ONYCHAUXIS: Status: ACTIVE | Noted: 2025-09-25
